# Patient Record
Sex: FEMALE | Race: WHITE | NOT HISPANIC OR LATINO | Employment: OTHER | URBAN - METROPOLITAN AREA
[De-identification: names, ages, dates, MRNs, and addresses within clinical notes are randomized per-mention and may not be internally consistent; named-entity substitution may affect disease eponyms.]

---

## 2017-11-16 ENCOUNTER — TRANSCRIBE ORDERS (OUTPATIENT)
Dept: ADMINISTRATIVE | Facility: HOSPITAL | Age: 77
End: 2017-11-16

## 2017-11-16 DIAGNOSIS — Z12.31 VISIT FOR SCREENING MAMMOGRAM: Primary | ICD-10-CM

## 2018-01-03 ENCOUNTER — HOSPITAL ENCOUNTER (OUTPATIENT)
Dept: MAMMOGRAPHY | Facility: HOSPITAL | Age: 78
Discharge: HOME | End: 2018-01-03
Attending: INTERNAL MEDICINE | Admitting: INTERNAL MEDICINE

## 2018-01-03 DIAGNOSIS — Z12.31 VISIT FOR SCREENING MAMMOGRAM: ICD-10-CM

## 2018-01-03 PROCEDURE — 77067 SCR MAMMO BI INCL CAD: CPT | Performed by: RADIOLOGY

## 2018-01-03 PROCEDURE — 77063 BREAST TOMOSYNTHESIS BI: CPT | Performed by: RADIOLOGY

## 2018-01-03 PROCEDURE — 77067 SCR MAMMO BI INCL CAD: CPT

## 2018-01-03 PROCEDURE — 77063 BREAST TOMOSYNTHESIS BI: CPT

## 2018-12-05 ENCOUNTER — TRANSCRIBE ORDERS (OUTPATIENT)
Dept: ADMINISTRATIVE | Facility: HOSPITAL | Age: 78
End: 2018-12-05

## 2018-12-05 DIAGNOSIS — Z12.31 VISIT FOR SCREENING MAMMOGRAM: Primary | ICD-10-CM

## 2019-01-24 PROBLEM — H40.9 GLAUCOMA: Status: ACTIVE | Noted: 2019-01-24

## 2019-01-24 PROBLEM — J30.9 ALLERGIC RHINITIS: Status: ACTIVE | Noted: 2019-01-24

## 2019-01-24 PROBLEM — I10 BENIGN ESSENTIAL HYPERTENSION: Status: ACTIVE | Noted: 2019-01-24

## 2019-01-24 PROBLEM — E55.9 VITAMIN D DEFICIENCY: Status: ACTIVE | Noted: 2019-01-24

## 2019-01-24 PROBLEM — E66.3 OVERWEIGHT (BMI 25.0-29.9): Status: ACTIVE | Noted: 2019-01-24

## 2019-01-24 PROBLEM — E78.2 MIXED HYPERLIPIDEMIA: Status: ACTIVE | Noted: 2019-01-24

## 2019-01-24 PROBLEM — M21.619 BUNION: Status: ACTIVE | Noted: 2019-01-24

## 2019-01-24 PROBLEM — M85.89 OSTEOPENIA OF MULTIPLE SITES: Status: ACTIVE | Noted: 2019-01-24

## 2019-01-24 PROBLEM — Z85.51 PERSONAL HISTORY OF BLADDER CANCER: Status: ACTIVE | Noted: 2019-01-24

## 2019-01-24 PROBLEM — K14.6 SORENESS OF TONGUE: Status: ACTIVE | Noted: 2019-01-24

## 2019-01-24 PROBLEM — K21.9 GERD WITHOUT ESOPHAGITIS: Status: ACTIVE | Noted: 2019-01-24

## 2019-01-24 PROBLEM — I70.0 ATHEROSCLEROSIS OF ABDOMINAL AORTA: Status: ACTIVE | Noted: 2019-01-24

## 2019-01-24 PROBLEM — M25.551 PAIN OF RIGHT HIP JOINT: Status: ACTIVE | Noted: 2019-01-24

## 2019-01-24 PROBLEM — Z91.81 RISK FOR FALLS: Status: ACTIVE | Noted: 2019-01-24

## 2019-01-24 RX ORDER — RANITIDINE 150 MG/1
TABLET ORAL
Qty: 90 TABLET | Refills: 1 | Status: SHIPPED | OUTPATIENT
Start: 2019-01-24 | End: 2019-04-19 | Stop reason: SDUPTHER

## 2019-01-25 ENCOUNTER — HOSPITAL ENCOUNTER (OUTPATIENT)
Dept: MAMMOGRAPHY | Facility: HOSPITAL | Age: 79
Discharge: HOME | End: 2019-01-25
Attending: INTERNAL MEDICINE | Admitting: INTERNAL MEDICINE

## 2019-01-25 DIAGNOSIS — Z12.31 VISIT FOR SCREENING MAMMOGRAM: ICD-10-CM

## 2019-01-25 PROCEDURE — 77063 BREAST TOMOSYNTHESIS BI: CPT | Performed by: RADIOLOGY

## 2019-01-25 PROCEDURE — 77067 SCR MAMMO BI INCL CAD: CPT | Performed by: RADIOLOGY

## 2019-01-25 PROCEDURE — 77067 SCR MAMMO BI INCL CAD: CPT

## 2019-01-25 PROCEDURE — 77063 BREAST TOMOSYNTHESIS BI: CPT

## 2019-02-01 ENCOUNTER — TELEPHONE (OUTPATIENT)
Dept: INTERNAL MEDICINE | Facility: CLINIC | Age: 79
End: 2019-02-01

## 2019-02-03 DIAGNOSIS — E78.2 MIXED HYPERLIPIDEMIA: Primary | ICD-10-CM

## 2019-02-04 ENCOUNTER — CLINICAL SUPPORT (OUTPATIENT)
Dept: INTERNAL MEDICINE | Facility: CLINIC | Age: 79
End: 2019-02-04

## 2019-02-04 DIAGNOSIS — E78.2 MIXED HYPERLIPIDEMIA: ICD-10-CM

## 2019-02-04 LAB
ALBUMIN SERPL-MCNC: 4.61 G/DL (ref 3.2–4.8)
ALBUMIN/GLOB SERPL: 2.6 G/DL (ref 1.5–2.5)
ALP SERPL-CCNC: 81 U/L (ref 25–100)
ALT SERPL W P-5'-P-CCNC: 29 U/L (ref 7–40)
ANION GAP SERPL CALCULATED.3IONS-SCNC: 8 MMOL/L (ref 3–11)
ARTICHOKE IGE QN: 94 MG/DL (ref 0–130)
AST SERPL-CCNC: 28 U/L (ref 0–33)
BILIRUB SERPL-MCNC: 0.4 MG/DL (ref 0.3–1.2)
BUN BLD-MCNC: 21 MG/DL (ref 9–23)
BUN/CREAT SERPL: 25.3 (ref 7–25)
CALCIUM SPEC-SCNC: 9.8 MG/DL (ref 8.7–10.4)
CHLORIDE SERPL-SCNC: 100 MMOL/L (ref 99–109)
CHOLEST SERPL-MCNC: 148 MG/DL (ref 0–200)
CO2 SERPL-SCNC: 31 MMOL/L (ref 20–31)
CREAT BLD-MCNC: 0.83 MG/DL (ref 0.6–1.3)
GFR SERPL CREATININE-BSD FRML MDRD: 67 ML/MIN/1.73
GLOBULIN UR ELPH-MCNC: 1.8 GM/DL
GLUCOSE BLD-MCNC: 97 MG/DL (ref 70–100)
HDLC SERPL-MCNC: 41 MG/DL (ref 40–60)
POTASSIUM BLD-SCNC: 4.2 MMOL/L (ref 3.5–5.5)
PROT SERPL-MCNC: 6.4 G/DL (ref 5.7–8.2)
SODIUM BLD-SCNC: 139 MMOL/L (ref 132–146)
TRIGL SERPL-MCNC: 120 MG/DL (ref 0–150)

## 2019-02-04 PROCEDURE — 80061 LIPID PANEL: CPT | Performed by: INTERNAL MEDICINE

## 2019-02-04 PROCEDURE — 36415 COLL VENOUS BLD VENIPUNCTURE: CPT

## 2019-02-04 PROCEDURE — 80053 COMPREHEN METABOLIC PANEL: CPT | Performed by: INTERNAL MEDICINE

## 2019-04-17 RX ORDER — SIMVASTATIN 80 MG
40 TABLET ORAL EVERY EVENING
Qty: 45 TABLET | Refills: 2 | Status: SHIPPED | OUTPATIENT
Start: 2019-04-17 | End: 2019-04-19 | Stop reason: SDUPTHER

## 2019-04-17 RX ORDER — METOPROLOL SUCCINATE 50 MG/1
50 TABLET, EXTENDED RELEASE ORAL 2 TIMES DAILY
Qty: 180 TABLET | Refills: 2 | Status: SHIPPED | OUTPATIENT
Start: 2019-04-17 | End: 2019-04-19 | Stop reason: SDUPTHER

## 2019-04-17 RX ORDER — HYDROCHLOROTHIAZIDE 12.5 MG/1
12.5 TABLET ORAL DAILY
Qty: 90 TABLET | Refills: 2 | Status: SHIPPED | OUTPATIENT
Start: 2019-04-17 | End: 2019-04-19 | Stop reason: SDUPTHER

## 2019-04-19 ENCOUNTER — OFFICE VISIT (OUTPATIENT)
Dept: INTERNAL MEDICINE | Facility: CLINIC | Age: 79
End: 2019-04-19

## 2019-04-19 VITALS
SYSTOLIC BLOOD PRESSURE: 126 MMHG | HEIGHT: 62 IN | WEIGHT: 143 LBS | BODY MASS INDEX: 26.31 KG/M2 | HEART RATE: 68 BPM | TEMPERATURE: 97.5 F | DIASTOLIC BLOOD PRESSURE: 76 MMHG

## 2019-04-19 DIAGNOSIS — Z00.00 MEDICARE ANNUAL WELLNESS VISIT, SUBSEQUENT: Primary | ICD-10-CM

## 2019-04-19 DIAGNOSIS — M85.89 OSTEOPENIA OF MULTIPLE SITES: ICD-10-CM

## 2019-04-19 PROCEDURE — G0439 PPPS, SUBSEQ VISIT: HCPCS | Performed by: NURSE PRACTITIONER

## 2019-04-19 PROCEDURE — 96160 PT-FOCUSED HLTH RISK ASSMT: CPT | Performed by: NURSE PRACTITIONER

## 2019-04-19 RX ORDER — METOPROLOL SUCCINATE 50 MG/1
50 TABLET, EXTENDED RELEASE ORAL 2 TIMES DAILY
Qty: 180 TABLET | Refills: 1 | Status: SHIPPED | OUTPATIENT
Start: 2019-04-19 | End: 2020-04-27 | Stop reason: SDUPTHER

## 2019-04-19 RX ORDER — SIMVASTATIN 80 MG
40 TABLET ORAL EVERY EVENING
Qty: 45 TABLET | Refills: 1 | Status: SHIPPED | OUTPATIENT
Start: 2019-04-19 | End: 2019-04-26 | Stop reason: DRUGHIGH

## 2019-04-19 RX ORDER — TRAVOPROST 0.004 %
1 DROPS OPHTHALMIC (EYE) NIGHTLY
COMMUNITY
Start: 2019-02-05 | End: 2021-07-28

## 2019-04-19 RX ORDER — OMEGA-3-ACID ETHYL ESTERS 1 G/1
4 CAPSULE, LIQUID FILLED ORAL NIGHTLY
COMMUNITY
Start: 2019-03-25 | End: 2019-04-26 | Stop reason: SDUPTHER

## 2019-04-19 RX ORDER — RANITIDINE 150 MG/1
150 TABLET ORAL DAILY
Qty: 90 TABLET | Refills: 1 | Status: SHIPPED | OUTPATIENT
Start: 2019-04-19 | End: 2019-09-06 | Stop reason: SDUPTHER

## 2019-04-19 RX ORDER — SILICONE ADHESIVE 1.5" X 3"
1 SHEET (EA) TOPICAL 2 TIMES DAILY
COMMUNITY
Start: 2018-04-11

## 2019-04-19 RX ORDER — ASPIRIN 325 MG
1 TABLET ORAL DAILY
COMMUNITY
Start: 2016-11-09 | End: 2019-04-26 | Stop reason: DRUGHIGH

## 2019-04-19 RX ORDER — HYDROCHLOROTHIAZIDE 12.5 MG/1
12.5 TABLET ORAL DAILY
Qty: 90 TABLET | Refills: 1 | Status: SHIPPED | OUTPATIENT
Start: 2019-04-19 | End: 2020-04-27 | Stop reason: SDUPTHER

## 2019-04-19 NOTE — PROGRESS NOTES
QUICK REFERENCE INFORMATION:  The ABCs of the Annual Wellness Visit    Subsequent Medicare Wellness Visit     HEALTH RISK ASSESSMENT    : 1940    Recent Hospitalizations:  No hospitalization(s) within the last year..  ccc      Current Medical Providers:  Patient Care Team:  Marie Soto MD as PCP - General  Marie Soto MD as PCP - Family Medicine        Smoking Status:  Social History     Tobacco Use   Smoking Status Never Smoker   Smokeless Tobacco Never Used       Alcohol Consumption:  Social History     Substance and Sexual Activity   Alcohol Use Yes   • Alcohol/week: 0.6 oz   • Types: 1 Shots of liquor per week       Depression Screen:   PHQ-2/PHQ-9 Depression Screening 2019   Little interest or pleasure in doing things 0   Feeling down, depressed, or hopeless 0   Total Score 0       Health Habits and Functional and Cognitive Screening:  Functional & Cognitive Status 2019   Do you have difficulty preparing food and eating? No   Do you have difficulty bathing yourself, getting dressed or grooming yourself? No   Do you have difficulty using the toilet? No   Do you have difficulty moving around from place to place? No   Do you have trouble with steps or getting out of a bed or a chair? No   In the past year have you fallen or experienced a near fall? No   Current Diet Well Balanced Diet   Dental Exam Up to date   Eye Exam Up to date   Exercise (times per week) 2 times per week   Current Exercise Activities Include Aerobics   Do you need help using the phone?  Yes   Are you deaf or do you have serious difficulty hearing?  Yes   Do you need help with transportation? No   Do you need help shopping? No   Do you need help preparing meals?  No   Do you need help with housework?  No   Do you need help with laundry? No   Do you need help taking your medications? No   Do you need help managing money? No   Do you ever drive or ride in a car without wearing a seat belt? No   Have you felt  unusual stress, anger or loneliness in the last month? No   Who do you live with? Alone   If you need help, do you have trouble finding someone available to you? No   Have you been bothered in the last four weeks by sexual problems? No   Do you have difficulty concentrating, remembering or making decisions? No           Does the patient have evidence of cognitive impairment? No    Asiprin use counseling: Taking ASA appropriately as indicated      Recent Lab Results:          Lab Results   Component Value Date    CHOL 148 02/04/2019    TRIG 120 02/04/2019    HDL 41 02/04/2019           Age-appropriate Screening Schedule:  Refer to the list below for future screening recommendations based on patient's age, sex and/or medical conditions. Orders for these recommended tests are listed in the plan section. The patient has been provided with a written plan.    Health Maintenance   Topic Date Due   • ZOSTER VACCINE (2 of 3) 11/26/2010   • DXA SCAN  05/26/2019   • INFLUENZA VACCINE  08/01/2019   • LIPID PANEL  02/04/2020   • TDAP/TD VACCINES (2 - Td) 04/19/2021   • PNEUMOCOCCAL VACCINES (65+ LOW/MEDIUM RISK)  Completed        Subjective   History of Present Illness    Amanda Lawson is a 78 y.o. female who presents for an Annual Wellness Visit.    The following portions of the patient's history were reviewed and updated as appropriate: allergies, current medications, past family history, past medical history, past social history, past surgical history and problem list.    Outpatient Medications Prior to Visit   Medication Sig Dispense Refill   • aspirin 325 MG tablet Take 1 tablet by mouth Daily.     • CALCIUM-VITAMIN D PO Take 1 tablet by mouth Daily.     • Cholecalciferol (VITAMIN D PO) Take 1 tablet by mouth Daily.     • Coenzyme Q10 (CO Q 10) 100 MG capsule Take 1 capsule by mouth Daily.     • Multiple Vitamins-Minerals (SENIOR MULTIVITAMIN PLUS PO) Take 1 tablet by mouth Daily.     • omega-3 acid ethyl esters (LOVAZA)  1 g capsule Take 4 capsules by mouth Every Night.     • sodium chloride (KARRIE 128) 5 % ophthalmic solution Apply 1 drop to eye(s) as directed by provider Every Night.     • TRAVATAN Z 0.004 % solution ophthalmic solution Apply 1 drop to eye(s) as directed by provider Every Night.     • vitamin E 200 UNIT capsule Take 1 capsule by mouth Daily.     • hydrochlorothiazide (HYDRODIURIL) 12.5 MG tablet Take 1 tablet by mouth Daily. 90 tablet 2   • metoprolol succinate XL (TOPROL-XL) 50 MG 24 hr tablet Take 1 tablet by mouth 2 (Two) Times a Day. 180 tablet 2   • raNITIdine (ZANTAC) 150 MG tablet TAKE ONE TABLET BY MOUTH DAILY 90 tablet 1   • simvastatin (ZOCOR) 80 MG tablet Take 0.5 tablets by mouth Every Evening. 45 tablet 2     No facility-administered medications prior to visit.        Patient Active Problem List   Diagnosis   • Vitamin D deficiency   • Personal history of bladder cancer   • Benign essential hypertension   • Allergic rhinitis   • Glaucoma   • Osteopenia of multiple sites   • Pain of right hip joint   • Risk for falls   • Mixed hyperlipidemia   • Bunion   • Atherosclerosis of abdominal aorta (CMS/HCC)   • Overweight (BMI 25.0-29.9)   • Soreness of tongue   • GERD without esophagitis       Advance Care Planning:  Patient has an advance directive - a copy has not been provided. Have asked the patient to send this to us to add to record    Identification of Risk Factors:  Risk factors include: none.    Review of Systems   Constitutional: Negative for chills, fatigue and fever.   HENT: Negative for congestion, ear pain and sinus pressure.    Respiratory: Negative for cough, chest tightness, shortness of breath and wheezing.    Cardiovascular: Negative for chest pain and palpitations.   Gastrointestinal: Negative for abdominal pain, blood in stool and constipation.   Skin: Negative for color change.   Allergic/Immunologic: Negative for environmental allergies.   Neurological: Negative for dizziness, speech  "difficulty and headaches.   Psychiatric/Behavioral: Negative for confusion. The patient is not nervous/anxious.        Compared to one year ago, the patient feels her physical health is the same.  Compared to one year ago, the patient feels her mental health is the same.    Objective     Physical Exam    Vitals:    04/19/19 0922   BP: 126/76   BP Location: Left arm   Patient Position: Sitting   Cuff Size: Adult   Pulse: 68   Temp: 97.5 °F (36.4 °C)   TempSrc: Temporal   Weight: 64.9 kg (143 lb)   Height: 157 cm (61.81\")   PainSc: 0-No pain       Patient's Body mass index is 26.32 kg/m². BMI is within normal parameters. No follow-up required..      Assessment/Plan   Patient Self-Management and Personalized Health Advice  The patient has been provided with information about: none and preventive services including:   · Bone densitometry screening, Fall Risk assessment done.    Visit Diagnoses:    ICD-10-CM ICD-9-CM   1. Medicare annual wellness visit, subsequent Z00.00 V70.0   2. Osteopenia of multiple sites M85.89 733.90       Orders Placed This Encounter   Procedures   • DEXA Bone Density Axial     Standing Status:   Future     Standing Expiration Date:   4/19/2020     Order Specific Question:   Reason for Exam:     Answer:   osteopenia       Outpatient Encounter Medications as of 4/19/2019   Medication Sig Dispense Refill   • aspirin 325 MG tablet Take 1 tablet by mouth Daily.     • CALCIUM-VITAMIN D PO Take 1 tablet by mouth Daily.     • Cholecalciferol (VITAMIN D PO) Take 1 tablet by mouth Daily.     • Coenzyme Q10 (CO Q 10) 100 MG capsule Take 1 capsule by mouth Daily.     • hydrochlorothiazide (HYDRODIURIL) 12.5 MG tablet Take 1 tablet by mouth Daily. 90 tablet 1   • metoprolol succinate XL (TOPROL-XL) 50 MG 24 hr tablet Take 1 tablet by mouth 2 (Two) Times a Day. 180 tablet 1   • Multiple Vitamins-Minerals (SENIOR MULTIVITAMIN PLUS PO) Take 1 tablet by mouth Daily.     • omega-3 acid ethyl esters (LOVAZA) 1 g " capsule Take 4 capsules by mouth Every Night.     • raNITIdine (ZANTAC) 150 MG tablet Take 1 tablet by mouth Daily. 90 tablet 1   • simvastatin (ZOCOR) 80 MG tablet Take 0.5 tablets by mouth Every Evening. 45 tablet 1   • sodium chloride (KARRIE 128) 5 % ophthalmic solution Apply 1 drop to eye(s) as directed by provider Every Night.     • TRAVATAN Z 0.004 % solution ophthalmic solution Apply 1 drop to eye(s) as directed by provider Every Night.     • vitamin E 200 UNIT capsule Take 1 capsule by mouth Daily.     • [DISCONTINUED] hydrochlorothiazide (HYDRODIURIL) 12.5 MG tablet Take 1 tablet by mouth Daily. 90 tablet 2   • [DISCONTINUED] metoprolol succinate XL (TOPROL-XL) 50 MG 24 hr tablet Take 1 tablet by mouth 2 (Two) Times a Day. 180 tablet 2   • [DISCONTINUED] raNITIdine (ZANTAC) 150 MG tablet TAKE ONE TABLET BY MOUTH DAILY 90 tablet 1   • [DISCONTINUED] simvastatin (ZOCOR) 80 MG tablet Take 0.5 tablets by mouth Every Evening. 45 tablet 2     No facility-administered encounter medications on file as of 4/19/2019.        Reviewed use of high risk medication in the elderly: no  Reviewed for potential of harmful drug interactions in the elderly: no    Follow Up:  Return for Next scheduled follow up, Annual physical.     An After Visit Summary and PPPS with all of these plans were given to the patient.

## 2019-04-19 NOTE — PATIENT INSTRUCTIONS
Medicare Wellness  Personal Prevention Plan of Service     Date of Office Visit:  2019  Encounter Provider:  RUY Spencer  Place of Service:  [facility]  Patient Name: Amanda Lawson  :  1940    As part of the Medicare Wellness portion of your visit today, we are providing you with this personalized preventive plan of services (PPPS). This plan is based upon recommendations of the [place] Preventive Services Task Force (USPSTF) and the Advisory Committee on Immunization Practices (ACIP).    This lists the preventive care services that should be considered, and provides dates of when you are due. Items listed as completed are up-to-date and do not require any further intervention.    Health Maintenance   Topic Date Due   • ZOSTER VACCINE (2 of 3) 2010   • MEDICARE ANNUAL WELLNESS  2019   • DXA SCAN  2019   • INFLUENZA VACCINE  2019   • LIPID PANEL  2020   • TDAP/TD VACCINES (2 - Td) 2021   • PNEUMOCOCCAL VACCINES (65+ LOW/MEDIUM RISK)  Completed       Orders Placed This Encounter   Procedures   • DEXA Bone Density Axial     Standing Status:   Future     Standing Expiration Date:   2020     Order Specific Question:   Reason for Exam:     Answer:   osteopenia       Return for Next scheduled follow up, Annual physical.

## 2019-04-26 ENCOUNTER — OFFICE VISIT (OUTPATIENT)
Dept: INTERNAL MEDICINE | Facility: CLINIC | Age: 79
End: 2019-04-26

## 2019-04-26 VITALS
SYSTOLIC BLOOD PRESSURE: 154 MMHG | WEIGHT: 144 LBS | DIASTOLIC BLOOD PRESSURE: 83 MMHG | TEMPERATURE: 97.9 F | BODY MASS INDEX: 26.5 KG/M2 | HEIGHT: 62 IN | HEART RATE: 72 BPM

## 2019-04-26 DIAGNOSIS — N39.3 STRESS INCONTINENCE OF URINE: ICD-10-CM

## 2019-04-26 DIAGNOSIS — I70.0 ATHEROSCLEROSIS OF ABDOMINAL AORTA (HCC): ICD-10-CM

## 2019-04-26 DIAGNOSIS — K21.9 GERD WITHOUT ESOPHAGITIS: ICD-10-CM

## 2019-04-26 DIAGNOSIS — E55.9 VITAMIN D DEFICIENCY: ICD-10-CM

## 2019-04-26 DIAGNOSIS — Z91.81 RISK FOR FALLS: ICD-10-CM

## 2019-04-26 DIAGNOSIS — E78.2 MIXED HYPERLIPIDEMIA: ICD-10-CM

## 2019-04-26 DIAGNOSIS — N81.4 PROLAPSED UTERUS: Chronic | ICD-10-CM

## 2019-04-26 DIAGNOSIS — Z85.51 PERSONAL HISTORY OF BLADDER CANCER: ICD-10-CM

## 2019-04-26 DIAGNOSIS — M85.89 OSTEOPENIA OF MULTIPLE SITES: ICD-10-CM

## 2019-04-26 DIAGNOSIS — I10 BENIGN ESSENTIAL HYPERTENSION: Primary | ICD-10-CM

## 2019-04-26 PROCEDURE — 99214 OFFICE O/P EST MOD 30 MIN: CPT | Performed by: INTERNAL MEDICINE

## 2019-04-26 RX ORDER — OMEGA-3-ACID ETHYL ESTERS 1 G/1
2 CAPSULE, LIQUID FILLED ORAL NIGHTLY
Qty: 180 CAPSULE | Refills: 1 | Status: SHIPPED | OUTPATIENT
Start: 2019-04-26 | End: 2019-11-06 | Stop reason: SDUPTHER

## 2019-04-26 RX ORDER — SIMVASTATIN 20 MG
20 TABLET ORAL NIGHTLY
Qty: 90 TABLET | Refills: 1 | Status: SHIPPED | OUTPATIENT
Start: 2019-04-26 | End: 2020-01-31

## 2019-04-26 NOTE — PROGRESS NOTES
[facility]     Amanda Lawson  1940   [id number]      Patient Care Team:  Marie Soto MD as PCP - General  Marie Soto MD as PCP - Family Medicine    Chief Complaint;:   Chief Complaint   Patient presents with   • Medicare Wellness-Initial Visit   Chief complaint: Patient presents with follow-up of chronic conditions including hypertension, hyperlipidemia, atherosclerosis, GERD.        HPI  Patient is a 78 y.o. female presents with follow-up of hypertension, hyperlipidemia, atherosclerosis, GERD.    CHRONIC CONDITIONS  Her hypertension has been well controlled.  This has been the only high readings she has seen.  It usually runs in the 120s over 70s as it did last week when she saw Jennifer.  She avoids salt in the diet.  She goes to aerobics class 3-4 days a week and walks.    For hyperlipidemia, she has been taking for the low lovaza tablets every evening and 80 mg of simvastatin every evening.  We checked her fasting lipids in February and her LDL was 94.  Her total cholesterol was 140.  Her triglycerides were well controlled as well.  She eats a low-fat diet and avoid sugars in the diet.    She does weightbearing exercises on the feet and with hand weights at her aerobics classes and some at home as well.  She takes vitamin D and calcium for osteopenia.    Atherosclerosis of the aorta is asymptomatic.  It was seen on imaging.  She does take an aspirin and beta-blocker and statin every day.    GERD symptoms are well controlled with ranitidine and practicing antireflux precautions.    She has not had any falls over the last year.  At her aerobics class they do particular exercises for balance and she feels that has helped her a lot.    She is taking vitamin D regularly.    She follows up with her neurologist regularly for the history of bladder cancer.            Past Medical History:   Diagnosis Date   • Bladder cancer (CMS/Formerly [facility]) 1990   • Plantar fasciitis 2010   • Rosacea    •  Vasovagal syncope     since childhood       Past Surgical History:   Procedure Laterality Date   • ENDOMETRIAL BIOPSY  1983       Family History   Problem Relation Age of Onset   • Coronary artery disease Mother    • Hypertension Father    • Coronary artery disease Brother    • Hyperlipidemia Daughter    • Obesity Maternal Grandmother    • Mitral valve prolapse Daughter    • Breast cancer Neg Hx    • Ovarian cancer Neg Hx        Social History     Socioeconomic History   • Marital status:      Spouse name: Not on file   • Number of children: Not on file   • Years of education: Not on file   • Highest education level: Not on file   Tobacco Use   • Smoking status: Never Smoker   • Smokeless tobacco: Never Used   Substance and Sexual Activity   • Alcohol use: Yes     Alcohol/week: 0.6 oz     Types: 1 Shots of liquor per week   • Drug use: No   • Sexual activity: Defer       No Known Allergies    Review of Systems:     Review of Systems   Constitutional: Negative for chills, fatigue, fever, unexpected weight gain and unexpected weight loss.   HENT: Negative for congestion, ear pain, sinus pressure, sore throat and trouble swallowing.    Eyes: Negative for visual disturbance.   Respiratory: Negative for cough, chest tightness, shortness of breath and wheezing.    Cardiovascular: Negative for chest pain, palpitations and leg swelling.   Gastrointestinal: Negative for abdominal pain, blood in stool, constipation, diarrhea and GERD.   Endocrine: Negative for cold intolerance and heat intolerance.   Genitourinary: Positive for urinary incontinence. Negative for dysuria, frequency, hematuria and urgency.        Occasional urine leakage with sneeze or hard cough.   Musculoskeletal: Negative for arthralgias, back pain, gait problem and joint swelling.   Skin: Negative for color change and rash.   Allergic/Immunologic: Negative for environmental allergies, food allergies and immunocompromised state.   Neurological:  "Negative for dizziness, speech difficulty, weakness, numbness, headache and memory problem.   Hematological: Negative for adenopathy. Does not bruise/bleed easily.   Psychiatric/Behavioral: Negative for decreased concentration, sleep disturbance, suicidal ideas and depressed mood. The patient is not nervous/anxious.        Vital Signs  Vitals:    04/26/19 0816   BP: 154/83   BP Location: Left arm   Patient Position: Sitting   Cuff Size: Adult   Pulse: 72   Temp: 97.9 °F (36.6 °C)   TempSrc: Temporal   Weight: 65.3 kg (144 lb)   Height: 157 cm (61.81\")   PainSc: 0-No pain     Body mass index is 26.5 kg/m².    Current Outpatient Medications:   •  CALCIUM-VITAMIN D PO, Take 1 tablet by mouth Daily., Disp: , Rfl:   •  Cholecalciferol (VITAMIN D PO), Take 1 tablet by mouth Daily., Disp: , Rfl:   •  Coenzyme Q10 (CO Q 10) 100 MG capsule, Take 1 capsule by mouth Daily., Disp: , Rfl:   •  hydrochlorothiazide (HYDRODIURIL) 12.5 MG tablet, Take 1 tablet by mouth Daily., Disp: 90 tablet, Rfl: 1  •  metoprolol succinate XL (TOPROL-XL) 50 MG 24 hr tablet, Take 1 tablet by mouth 2 (Two) Times a Day., Disp: 180 tablet, Rfl: 1  •  Multiple Vitamins-Minerals (SENIOR MULTIVITAMIN PLUS PO), Take 1 tablet by mouth Daily., Disp: , Rfl:   •  omega-3 acid ethyl esters (LOVAZA) 1 g capsule, Take 2 capsules by mouth Every Night., Disp: 180 capsule, Rfl: 1  •  raNITIdine (ZANTAC) 150 MG tablet, Take 1 tablet by mouth Daily., Disp: 90 tablet, Rfl: 1  •  sodium chloride (KARRIE 128) 5 % ophthalmic solution, Apply 1 drop to eye(s) as directed by provider Every Night., Disp: , Rfl:   •  TRAVATAN Z 0.004 % solution ophthalmic solution, Apply 1 drop to eye(s) as directed by provider Every Night., Disp: , Rfl:   •  aspirin 81 MG tablet, Take 1 tablet by mouth Daily., Disp: 90 tablet, Rfl: 1  •  simvastatin (ZOCOR) 20 MG tablet, Take 1 tablet by mouth Every Night., Disp: 90 tablet, Rfl: 1    Physical Exam:    Physical Exam   Constitutional: She is " oriented to person, place, and time. She appears well-developed and well-nourished.   Eyes: Conjunctivae and EOM are normal. Pupils are equal, round, and reactive to light.   Neck: Normal range of motion. Neck supple. No thyromegaly present.   Cardiovascular: Normal rate, regular rhythm, normal heart sounds and intact distal pulses.   No murmur heard.  Pulmonary/Chest: Effort normal and breath sounds normal. She has no wheezes. Right breast exhibits inverted nipple. Right breast exhibits no mass, no nipple discharge, no skin change and no tenderness. Left breast exhibits inverted nipple. Left breast exhibits no mass, no nipple discharge, no skin change and no tenderness.   Inverted nipples are chronic and unchanged.   Abdominal: Soft. Bowel sounds are normal. She exhibits no distension and no mass. There is no tenderness.   Musculoskeletal: Normal range of motion. She exhibits no edema or tenderness.       Neurological Sensory Findings -  No right ankle/foot altered proprioception and no left ankle/foot altered proprioception  Vascular Status -  Her right foot exhibits normal foot vasculature  and no edema. Her left foot exhibits normal foot vasculature  and no edema.  Skin Integrity  -  Her right foot skin is intact.Her left foot skin is intact..     Lymphadenopathy:        Head (right side): No submental, no submandibular, no preauricular and no posterior auricular adenopathy present.        Head (left side): No submental, no submandibular, no preauricular and no posterior auricular adenopathy present.     She has no cervical adenopathy.     She has no axillary adenopathy.   Neurological: She is alert and oriented to person, place, and time. She has normal strength. No cranial nerve deficit or sensory deficit. Coordination and gait normal.   Skin: Skin is warm and dry. No rash noted.   Psychiatric: She has a normal mood and affect. Her speech is normal and behavior is normal. Judgment and thought content normal.  Cognition and memory are normal.   Nursing note and vitals reviewed.       ACE III MINI        Results Review:    None we did review her fasting labs from February.    CMP:  Lab Results   Component Value Date    BUN 21 02/04/2019    CREATININE 0.83 02/04/2019    EGFRIFNONA 67 02/04/2019    BCR 25.3 (H) 02/04/2019     02/04/2019    K 4.2 02/04/2019    CO2 31.0 02/04/2019    CALCIUM 9.8 02/04/2019    ALBUMIN 4.61 02/04/2019    BILITOT 0.4 02/04/2019    ALKPHOS 81 02/04/2019    AST 28 02/04/2019    ALT 29 02/04/2019     HbA1c:  No results found for: HGBA1C  Microalbumin:  No results found for: MICROALBUR, POCMALB, POCCREAT  Lipid Panel  Lab Results   Component Value Date    CHOL 148 02/04/2019    TRIG 120 02/04/2019    HDL 41 02/04/2019    LDL 94 02/04/2019    AST 28 02/04/2019    ALT 29 02/04/2019       Medication Review: Medications reviewed and noted    Assessment/Plan:    Amanda was seen today for medicare wellness-initial visit.    Diagnoses and all orders for this visit:    Benign essential hypertension    Mixed hyperlipidemia  -     simvastatin (ZOCOR) 20 MG tablet; Take 1 tablet by mouth Every Night.    Osteopenia of multiple sites    Prolapsed uterus    Atherosclerosis of abdominal aorta (CMS/HCC)  -     aspirin 81 MG tablet; Take 1 tablet by mouth Daily.    GERD without esophagitis    Risk for falls    Vitamin D deficiency    Personal history of bladder cancer    Stress incontinence of urine    Other orders  -     omega-3 acid ethyl esters (LOVAZA) 1 g capsule; Take 2 capsules by mouth Every Night.        Patient Instructions   For hypertension, continue taking hydrochlorothiazide daily and metoprolol twice a day.  Continue to avoid salt in the diet.    For hyperlipidemia, we will decrease the simvastatin to 20 mg every evening instead of 40 mg since her numbers are very good.  We will decrease the lovaza (omega-3 acid ethyl esters) 2 to capsules every evening instead of 4.  Continue to exercise regularly  and eat a low-fat and low sugar diet.    For osteopenia, continue taking calcium and vitamin D.  Continue doing weightbearing exercises including walking and with hand weights.  Use small hand weights for 5 to 10 minutes at home on the days you do not go to exercise class.    For GERD symptoms, continue taking ranitidine.  Continue to avoid eating close to bedtime and avoid large meals.    For atherosclerosis of the aorta which is asymptomatic, taking your metoprolol and aspirin and simvastatin every day helps to prevent any problems with that.    For prolapsed uterus, she will continue doing the pelvic strengthening exercises that the gynecologist showed her.    Stress incontinence of urine with sneeze or cough is mild and intermittent.  Doing the pelvic strengthening exercises well help that too.    For history of bladder cancer, she will continue regular follow-up with her urologist.      Fall Prevention in the Home, Adult  Falls can cause injuries and can affect people from all age groups. There are many simple things that you can do to make your home safe and to help prevent falls. Ask for help when making these changes, if needed.  What actions can I take to prevent falls?  General instructions  · Use good lighting in all rooms. Replace any light bulbs that burn out.  · Turn on lights if it is dark. Use night-lights.  · Place frequently used items in easy-to-reach places. Lower the shelves around your home if necessary.  · Set up furniture so that there are clear paths around it. Avoid moving your furniture around.  · Remove throw rugs and other tripping hazards from the floor.  · Avoid walking on wet floors.  · Fix any uneven floor surfaces.  · Add color or contrast paint or tape to grab bars and handrails in your home. Place contrasting color strips on the first and last steps of stairways.  · When you use a stepladder, make sure that it is completely opened and that the sides are firmly locked. Have someone  hold the ladder while you are using it. Do not climb a closed stepladder.  · Be aware of any and all pets.  What can I do in the bathroom?  · Keep the floor dry. Immediately clean up any water that spills onto the floor.  · Remove soap buildup in the tub or shower on a regular basis.  · Use non-skid mats or decals on the floor of the tub or shower.  · Attach bath mats securely with double-sided, non-slip rug tape.  · If you need to sit down while you are in the shower, use a plastic, non-slip stool.  · Install grab bars by the toilet and in the tub and shower. Do not use towel bars as grab bars.  What can I do in the bedroom?  · Make sure that a bedside light is easy to reach.  · Do not use oversized bedding that drapes onto the floor.  · Have a firm chair that has side arms to use for getting dressed.  What can I do in the kitchen?  · Clean up any spills right away.  · If you need to reach for something above you, use a sturdy step stool that has a grab bar.  · Keep electrical cables out of the way.  · Do not use floor polish or wax that makes floors slippery. If you must use wax, make sure that it is non-skid floor wax.  What can I do in the stairways?  · Do not leave any items on the stairs.  · Make sure that you have a light switch at the top of the stairs and the bottom of the stairs. Have them installed if you do not have them.  · Make sure that there are handrails on both sides of the stairs. Fix handrails that are broken or loose. Make sure that handrails are as long as the stairways.  · Install non-slip stair treads on all stairs in your home.  · Avoid having throw rugs at the top or bottom of stairways, or secure the rugs with carpet tape to prevent them from moving.  · Choose a carpet design that does not hide the edge of steps on the stairway.  · Check any carpeting to make sure that it is firmly attached to the stairs. Fix any carpet that is loose or worn.  What can I do on the outside of my  home?  · Use bright outdoor lighting.  · Regularly repair the edges of walkways and driveways and fix any cracks.  · Remove high doorway thresholds.  · Trim any shrubbery on the main path into your home.  · Regularly check that handrails are securely fastened and in good repair. Both sides of any steps should have handrails.  · Install guardrails along the edges of any raised decks or porches.  · Clear walkways of debris and clutter, including tools and rocks.  · Have leaves, snow, and ice cleared regularly.  · Use sand or salt on walkways during winter months.  · In the garage, clean up any spills right away, including grease or oil spills.  What other actions can I take?  · Wear closed-toe shoes that fit well and support your feet. Wear shoes that have rubber soles or low heels.  · Use mobility aids as needed, such as canes, walkers, scooters, and crutches.  · Review your medicines with your health care provider. Some medicines can cause dizziness or changes in blood pressure, which increase your risk of falling.  Talk with your health care provider about other ways that you can decrease your risk of falls. This may include working with a physical therapist or  to improve your strength, balance, and endurance.  Where to find more information  · Centers for Disease Control and Prevention, [place]: https://www.cdc.gov  · National [place] on Aging: https://xb8govn.rajeev.nih.gov  Contact a health care provider if:  · You are afraid of falling at home.  · You feel weak, drowsy, or dizzy at home.  · You fall at home.  Summary  · There are many simple things that you can do to make your home safe and to help prevent falls.  · Ways to make your home safe include removing tripping hazards and installing grab bars in the bathroom.  · Ask for help when making these changes in your home.  This information is not intended to replace advice given to you by your health care provider. Make sure you discuss any questions you  have with your health care provider.  Document Released: 12/08/2003 Document Revised: 08/02/2018 Document Reviewed: 08/02/2018  [employer] Interactive Patient Education © 2019 [employer]        Plan of care reviewed with patient at the conclusion of today's visit. Education was provided regarding diagnosis, management, and any prescribed or recommended OTC medications.Patient verbalizes understanding of and agreement with management plan.         Marie Soto MD

## 2019-04-26 NOTE — PATIENT INSTRUCTIONS
For hypertension, continue taking hydrochlorothiazide daily and metoprolol twice a day.  Continue to avoid salt in the diet.    For hyperlipidemia, we will decrease the simvastatin to 20 mg every evening instead of 40 mg since her numbers are very good.  We will decrease the lovaza (omega-3 acid ethyl esters) 2 to capsules every evening instead of 4.  Continue to exercise regularly and eat a low-fat and low sugar diet.    For osteopenia, continue taking calcium and vitamin D.  Continue doing weightbearing exercises including walking and with hand weights.  Use small hand weights for 5 to 10 minutes at home on the days you do not go to exercise class.    For GERD symptoms, continue taking ranitidine.  Continue to avoid eating close to bedtime and avoid large meals.    For atherosclerosis of the aorta which is asymptomatic, taking your metoprolol and aspirin and simvastatin every day helps to prevent any problems with that.    For prolapsed uterus, she will continue doing the pelvic strengthening exercises that the gynecologist showed her.    Stress incontinence of urine with sneeze or cough is mild and intermittent.  Doing the pelvic strengthening exercises well help that too.    For history of bladder cancer, she will continue regular follow-up with her urologist.      Fall Prevention in the Home, Adult  Falls can cause injuries and can affect people from all age groups. There are many simple things that you can do to make your home safe and to help prevent falls. Ask for help when making these changes, if needed.  What actions can I take to prevent falls?  General instructions  · Use good lighting in all rooms. Replace any light bulbs that burn out.  · Turn on lights if it is dark. Use night-lights.  · Place frequently used items in easy-to-reach places. Lower the shelves around your home if necessary.  · Set up furniture so that there are clear paths around it. Avoid moving your furniture around.  · Remove throw  rugs and other tripping hazards from the floor.  · Avoid walking on wet floors.  · Fix any uneven floor surfaces.  · Add color or contrast paint or tape to grab bars and handrails in your home. Place contrasting color strips on the first and last steps of stairways.  · When you use a stepladder, make sure that it is completely opened and that the sides are firmly locked. Have someone hold the ladder while you are using it. Do not climb a closed stepladder.  · Be aware of any and all pets.  What can I do in the bathroom?  · Keep the floor dry. Immediately clean up any water that spills onto the floor.  · Remove soap buildup in the tub or shower on a regular basis.  · Use non-skid mats or decals on the floor of the tub or shower.  · Attach bath mats securely with double-sided, non-slip rug tape.  · If you need to sit down while you are in the shower, use a plastic, non-slip stool.  · Install grab bars by the toilet and in the tub and shower. Do not use towel bars as grab bars.  What can I do in the bedroom?  · Make sure that a bedside light is easy to reach.  · Do not use oversized bedding that drapes onto the floor.  · Have a firm chair that has side arms to use for getting dressed.  What can I do in the kitchen?  · Clean up any spills right away.  · If you need to reach for something above you, use a sturdy step stool that has a grab bar.  · Keep electrical cables out of the way.  · Do not use floor polish or wax that makes floors slippery. If you must use wax, make sure that it is non-skid floor wax.  What can I do in the stairways?  · Do not leave any items on the stairs.  · Make sure that you have a light switch at the top of the stairs and the bottom of the stairs. Have them installed if you do not have them.  · Make sure that there are handrails on both sides of the stairs. Fix handrails that are broken or loose. Make sure that handrails are as long as the stairways.  · Install non-slip stair treads on all  stairs in your home.  · Avoid having throw rugs at the top or bottom of stairways, or secure the rugs with carpet tape to prevent them from moving.  · Choose a carpet design that does not hide the edge of steps on the stairway.  · Check any carpeting to make sure that it is firmly attached to the stairs. Fix any carpet that is loose or worn.  What can I do on the outside of my home?  · Use bright outdoor lighting.  · Regularly repair the edges of walkways and driveways and fix any cracks.  · Remove high doorway thresholds.  · Trim any shrubbery on the main path into your home.  · Regularly check that handrails are securely fastened and in good repair. Both sides of any steps should have handrails.  · Install guardrails along the edges of any raised decks or porches.  · Clear walkways of debris and clutter, including tools and rocks.  · Have leaves, snow, and ice cleared regularly.  · Use sand or salt on walkways during winter months.  · In the garage, clean up any spills right away, including grease or oil spills.  What other actions can I take?  · Wear closed-toe shoes that fit well and support your feet. Wear shoes that have rubber soles or low heels.  · Use mobility aids as needed, such as canes, walkers, scooters, and crutches.  · Review your medicines with your health care provider. Some medicines can cause dizziness or changes in blood pressure, which increase your risk of falling.  Talk with your health care provider about other ways that you can decrease your risk of falls. This may include working with a physical therapist or  to improve your strength, balance, and endurance.  Where to find more information  · Centers for Disease Control and Prevention, [place]: https://www.cdc.gov  · National [place] on Aging: https://fi9yyva.rajeev.nih.gov  Contact a health care provider if:  · You are afraid of falling at home.  · You feel weak, drowsy, or dizzy at home.  · You fall at home.  Summary  · There are  many simple things that you can do to make your home safe and to help prevent falls.  · Ways to make your home safe include removing tripping hazards and installing grab bars in the bathroom.  · Ask for help when making these changes in your home.  This information is not intended to replace advice given to you by your health care provider. Make sure you discuss any questions you have with your health care provider.  Document Released: 12/08/2003 Document Revised: 08/02/2018 Document Reviewed: 08/02/2018  [employer] Interactive Patient Education © 2019 [employer]

## 2019-09-06 RX ORDER — RANITIDINE 150 MG/1
150 TABLET ORAL DAILY
Qty: 90 TABLET | Refills: 1 | Status: SHIPPED | OUTPATIENT
Start: 2019-09-06 | End: 2019-11-06

## 2019-10-30 ENCOUNTER — TELEPHONE (OUTPATIENT)
Dept: INTERNAL MEDICINE | Facility: CLINIC | Age: 79
End: 2019-10-30

## 2019-10-30 DIAGNOSIS — E78.2 MIXED HYPERLIPIDEMIA: ICD-10-CM

## 2019-10-30 DIAGNOSIS — E55.9 VITAMIN D DEFICIENCY: ICD-10-CM

## 2019-10-30 DIAGNOSIS — I10 BENIGN ESSENTIAL HYPERTENSION: Primary | ICD-10-CM

## 2019-11-06 ENCOUNTER — OFFICE VISIT (OUTPATIENT)
Dept: INTERNAL MEDICINE | Facility: CLINIC | Age: 79
End: 2019-11-06

## 2019-11-06 ENCOUNTER — LAB REQUISITION (OUTPATIENT)
Dept: LAB | Facility: HOSPITAL | Age: 79
End: 2019-11-06

## 2019-11-06 VITALS
HEART RATE: 82 BPM | WEIGHT: 145 LBS | SYSTOLIC BLOOD PRESSURE: 136 MMHG | DIASTOLIC BLOOD PRESSURE: 84 MMHG | BODY MASS INDEX: 26.68 KG/M2 | HEIGHT: 62 IN

## 2019-11-06 DIAGNOSIS — K21.9 GERD WITHOUT ESOPHAGITIS: ICD-10-CM

## 2019-11-06 DIAGNOSIS — E78.2 MIXED HYPERLIPIDEMIA: ICD-10-CM

## 2019-11-06 DIAGNOSIS — Z00.00 ROUTINE GENERAL MEDICAL EXAMINATION AT A HEALTH CARE FACILITY: ICD-10-CM

## 2019-11-06 DIAGNOSIS — E55.9 VITAMIN D DEFICIENCY: ICD-10-CM

## 2019-11-06 DIAGNOSIS — I70.0 ATHEROSCLEROSIS OF ABDOMINAL AORTA (HCC): ICD-10-CM

## 2019-11-06 DIAGNOSIS — I10 BENIGN ESSENTIAL HYPERTENSION: Primary | ICD-10-CM

## 2019-11-06 PROBLEM — H40.10X0 OPEN-ANGLE GLAUCOMA: Chronic | Status: ACTIVE | Noted: 2019-01-24

## 2019-11-06 PROCEDURE — 99214 OFFICE O/P EST MOD 30 MIN: CPT | Performed by: INTERNAL MEDICINE

## 2019-11-06 PROCEDURE — 36415 COLL VENOUS BLD VENIPUNCTURE: CPT | Performed by: INTERNAL MEDICINE

## 2019-11-06 RX ORDER — OMEGA-3-ACID ETHYL ESTERS 1 G/1
2 CAPSULE, LIQUID FILLED ORAL NIGHTLY
Qty: 180 CAPSULE | Refills: 3 | Status: SHIPPED | OUTPATIENT
Start: 2019-11-06 | End: 2020-08-04 | Stop reason: SDUPTHER

## 2019-11-06 RX ORDER — FAMOTIDINE 20 MG/1
20 TABLET, FILM COATED ORAL 2 TIMES DAILY
Qty: 180 TABLET | Refills: 1 | Status: SHIPPED | OUTPATIENT
Start: 2019-11-06 | End: 2020-04-27 | Stop reason: SDUPTHER

## 2019-11-06 NOTE — PATIENT INSTRUCTIONS
Patient Instructions   Problem List Items Addressed This Visit        Cardiovascular and Mediastinum    Atherosclerosis of abdominal aorta (CMS/HCC)    Overview     11/6/2019 Marie Soto MD    Continue daily coated 81mg aspirin and continue good blood pressure control.         Relevant Medications    aspirin 81 MG tablet    Benign essential hypertension    Overview     11/7/2019 Marie Soto MD    Continue metoprolol and hydrochlorothiazide.  Continue to avoid salt in the diet.  Avoid sodas.         Relevant Medications    metoprolol succinate XL (TOPROL-XL) 50 MG 24 hr tablet    hydrochlorothiazide (HYDRODIURIL) 12.5 MG tablet    Mixed hyperlipidemia    Overview     11/7/2019 Marie Soto MD    Continue simvastatin every evening.  Continue low-fat diet and regular exercise.         Relevant Medications    simvastatin (ZOCOR) 20 MG tablet    omega-3 acid ethyl esters (LOVAZA) 1 g capsule       Digestive    Vitamin D deficiency    Overview     11/7/2019 Marie Soto MD    Continue current dose.         GERD without esophagitis - Primary    Overview     11/7/2019 Marie Soto MD    Change ranitidine to famotidine twice a day.  Continue to avoid eating close to bedtime and avoid large meals.         Relevant Medications    famotidine (PEPCID) 20 MG tablet

## 2019-11-06 NOTE — PROGRESS NOTES
[facility]     Amanda Lawson  1940   [id number]      Patient Care Team:  Marie Soto MD as PCP - General  Marie Soto MD as PCP - Family Medicine  Holly Pierce MD as Consulting Physician (Ophthalmology)    Chief Complaint   Patient presents with   • Hypertension     f/u   • Hyperlipidemia            HPI  Patient is a 79 y.o. female presents with hypertension,hyperlipidemia    CHRONIC CONDITIONS    Recent corneal transplant. Doing well.    BP's at home are controlled. Takes meds regularly.    Eats low fat diet . Regular exercise. Takes statin.    For aortic atherosclerosis, takes ASA and BP meds.    GERD controlled on ranitidine.But her pharmacy told her it was recalled.    Past Medical History:   Diagnosis Date   • Bladder cancer (CMS/[facility]) 1990   • Plantar fasciitis 2010   • Rosacea    • Vasovagal syncope     since childhood       Past Surgical History:   Procedure Laterality Date   • ENDOMETRIAL BIOPSY  1983       Family History   Problem Relation Age of Onset   • Coronary artery disease Mother    • Hypertension Father    • Coronary artery disease Brother    • Hyperlipidemia Daughter    • Obesity Maternal Grandmother    • Mitral valve prolapse Daughter    • Breast cancer Neg Hx    • Ovarian cancer Neg Hx        Social History     Socioeconomic History   • Marital status:      Spouse name: Not on file   • Number of children: Not on file   • Years of education: Not on file   • Highest education level: Not on file   Tobacco Use   • Smoking status: Never Smoker   • Smokeless tobacco: Never Used   Substance and Sexual Activity   • Alcohol use: Yes     Alcohol/week: 0.6 oz     Types: 1 Shots of liquor per week   • Drug use: No   • Sexual activity: Defer       No Known Allergies    Review of Systems:     Review of Systems   Constitutional: Negative for chills, fatigue and fever.   Respiratory: Negative for cough, shortness of breath and wheezing.    Cardiovascular:  "Negative for chest pain, palpitations and leg swelling.   Gastrointestinal: Negative for abdominal pain, blood in stool, constipation and diarrhea.   Genitourinary: Negative for dysuria and frequency.   Musculoskeletal: Negative for back pain and gait problem.       Vital Signs  Vitals:    11/06/19 1121   BP: 136/84   BP Location: Left arm   Patient Position: Sitting   Cuff Size: Adult   Pulse: 82   Weight: 65.8 kg (145 lb)   Height: 157 cm (61.81\")   PainSc: 0-No pain     Body mass index is 26.68 kg/m².      Current Outpatient Medications:   •  aspirin 81 MG tablet, Take 1 tablet by mouth Daily., Disp: 90 tablet, Rfl: 1  •  CALCIUM-VITAMIN D PO, Take 1 tablet by mouth Daily., Disp: , Rfl:   •  Cholecalciferol (VITAMIN D PO), Take 1 tablet by mouth Daily., Disp: , Rfl:   •  Coenzyme Q10 (CO Q 10) 100 MG capsule, Take 1 capsule by mouth Daily., Disp: , Rfl:   •  hydrochlorothiazide (HYDRODIURIL) 12.5 MG tablet, Take 1 tablet by mouth Daily., Disp: 90 tablet, Rfl: 1  •  metoprolol succinate XL (TOPROL-XL) 50 MG 24 hr tablet, Take 1 tablet by mouth 2 (Two) Times a Day., Disp: 180 tablet, Rfl: 1  •  Multiple Vitamins-Minerals (SENIOR MULTIVITAMIN PLUS PO), Take 1 tablet by mouth Daily., Disp: , Rfl:   •  omega-3 acid ethyl esters (LOVAZA) 1 g capsule, Take 2 capsules by mouth Every Night., Disp: 180 capsule, Rfl: 3  •  simvastatin (ZOCOR) 20 MG tablet, Take 1 tablet by mouth Every Night., Disp: 90 tablet, Rfl: 1  •  sodium chloride (KARRIE 128) 5 % ophthalmic solution, Apply 1 drop to eye(s) as directed by provider Every Night., Disp: , Rfl:   •  TRAVATAN Z 0.004 % solution ophthalmic solution, Apply 1 drop to eye(s) as directed by provider Every Night., Disp: , Rfl:   •  famotidine (PEPCID) 20 MG tablet, Take 1 tablet by mouth 2 (Two) Times a Day., Disp: 180 tablet, Rfl: 1    Physical Exam:    Physical Exam   Constitutional: She is oriented to person, place, and time. She appears well-developed and well-nourished. "   HENT:   Head: Normocephalic.   Eyes: Conjunctivae and EOM are normal. Pupils are equal, round, and reactive to light.   Neck: Normal range of motion. Neck supple. No thyromegaly present.   Cardiovascular: Normal rate, regular rhythm, normal heart sounds and intact distal pulses.   Pulmonary/Chest: Effort normal and breath sounds normal.   Musculoskeletal: Normal range of motion. She exhibits no edema.   Lymphadenopathy:     She has no cervical adenopathy.   Neurological: She is alert and oriented to person, place, and time.   Psychiatric: She has a normal mood and affect. Thought content normal.   Nursing note and vitals reviewed.       ACE III MINI        Results Review:    None    CMP:  Lab Results   Component Value Date    GLU 95 11/06/2019    BUN 16 11/06/2019    CREATININE 0.83 11/06/2019    EGFRIFNONA 66 11/06/2019    EGFRIFAFRI 81 11/06/2019    BCR 19.3 11/06/2019     11/06/2019    K 4.3 11/06/2019    CO2 28.9 11/06/2019    CALCIUM 10.1 11/06/2019    PROTENTOTREF 7.2 11/06/2019    ALBUMIN 5.20 11/06/2019    LABGLOBREF 2.0 11/06/2019    LABIL2 2.6 11/06/2019    BILITOT 0.4 11/06/2019    ALKPHOS 70 11/06/2019    AST 27 11/06/2019    ALT 24 11/06/2019     HbA1c:  No results found for: HGBA1C  Microalbumin:  Lab Results   Component Value Date    MICROALBUR 34.4 11/06/2019     Lipid Panel  Lab Results   Component Value Date    CHOL 148 02/04/2019    TRIG 127 11/06/2019    HDL 62 (H) 11/06/2019    LDL 87 11/06/2019    AST 27 11/06/2019    ALT 24 11/06/2019       Medication Review: Medications reviewed and noted  Patient Instructions   Problem List Items Addressed This Visit        Cardiovascular and Mediastinum    Atherosclerosis of abdominal aorta (CMS/HCC)    Overview     11/6/2019 Marie Soto MD    Continue daily coated 81mg aspirin and continue good blood pressure control.         Relevant Medications    aspirin 81 MG tablet    Benign essential hypertension - Primary    Overview     11/7/2019  Marie Soto MD    Continue metoprolol and hydrochlorothiazide.  Continue to avoid salt in the diet.  Avoid sodas.         Relevant Medications    metoprolol succinate XL (TOPROL-XL) 50 MG 24 hr tablet    hydrochlorothiazide (HYDRODIURIL) 12.5 MG tablet    Mixed hyperlipidemia    Overview     11/7/2019 Marie Soto MD    Continue simvastatin every evening.  Continue low-fat diet and regular exercise.         Relevant Medications    simvastatin (ZOCOR) 20 MG tablet    omega-3 acid ethyl esters (LOVAZA) 1 g capsule       Digestive    Vitamin D deficiency    Overview     11/7/2019 Marie Soto MD    Continue current dose.         GERD without esophagitis    Overview     11/7/2019 Marie Soto MD    Change ranitidine to famotidine twice a day.  Continue to avoid eating close to bedtime and avoid large meals.         Relevant Medications    famotidine (PEPCID) 20 MG tablet             Diagnosis Plan   1. Benign essential hypertension  Microalbumin / Creatinine Urine Ratio -    Urinalysis without microscopic (no culture) -   2. Mixed hyperlipidemia  TSH    Lipid Panel    CBC & Differential    Comprehensive Metabolic Panel   3. GERD without esophagitis  famotidine (PEPCID) 20 MG tablet   4. Atherosclerosis of abdominal aorta (CMS/HCC)     5. Vitamin D deficiency  Vitamin D 25 Hydroxy       Patient Instructions       Patient Instructions   Problem List Items Addressed This Visit        Cardiovascular and Mediastinum    Atherosclerosis of abdominal aorta (CMS/HCC)    Overview     11/6/2019 Marie Soto MD    Continue daily coated 81mg aspirin and continue good blood pressure control.         Relevant Medications    aspirin 81 MG tablet    Benign essential hypertension    Overview     11/7/2019 Marie Soto MD    Continue metoprolol and hydrochlorothiazide.  Continue to avoid salt in the diet.  Avoid sodas.         Relevant Medications    metoprolol succinate XL (TOPROL-XL) 50 MG 24 hr tablet     hydrochlorothiazide (HYDRODIURIL) 12.5 MG tablet    Mixed hyperlipidemia    Overview     11/7/2019 Marie Soto MD    Continue simvastatin every evening.  Continue low-fat diet and regular exercise.         Relevant Medications    simvastatin (ZOCOR) 20 MG tablet    omega-3 acid ethyl esters (LOVAZA) 1 g capsule       Digestive    Vitamin D deficiency    Overview     11/7/2019 Marie Soto MD    Continue current dose.         GERD without esophagitis - Primary    Overview     11/7/2019 Marie Soto MD    Change ranitidine to famotidine twice a day.  Continue to avoid eating close to bedtime and avoid large meals.         Relevant Medications    famotidine (PEPCID) 20 MG tablet                 Plan of care reviewed with patient at the conclusion of today's visit. Education was provided regarding diagnosis, management, and any prescribed or recommended OTC medications.Patient verbalizes understanding of and agreement with management plan.         Marie Soto MD

## 2019-11-07 LAB
25(OH)D3+25(OH)D2 SERPL-MCNC: 56.7 NG/ML (ref 30–100)
ALBUMIN SERPL-MCNC: 5.2 G/DL (ref 3.5–5.2)
ALBUMIN/CREAT UR: 24.4 MG/G CREAT (ref 0–30)
ALBUMIN/GLOB SERPL: 2.6 G/DL
ALP SERPL-CCNC: 70 U/L (ref 39–117)
ALT SERPL-CCNC: 24 U/L (ref 1–33)
APPEARANCE UR: ABNORMAL
AST SERPL-CCNC: 27 U/L (ref 1–32)
BASOPHILS # BLD AUTO: 0.03 10*3/MM3 (ref 0–0.2)
BASOPHILS NFR BLD AUTO: 0.6 % (ref 0–1.5)
BILIRUB SERPL-MCNC: 0.4 MG/DL (ref 0.2–1.2)
BILIRUB UR QL STRIP: NEGATIVE
BUN SERPL-MCNC: 16 MG/DL (ref 8–23)
BUN/CREAT SERPL: 19.3 (ref 7–25)
CALCIUM SERPL-MCNC: 10.1 MG/DL (ref 8.6–10.5)
CHLORIDE SERPL-SCNC: 97 MMOL/L (ref 98–107)
CHOLEST SERPL-MCNC: 174 MG/DL (ref 0–200)
CO2 SERPL-SCNC: 28.9 MMOL/L (ref 22–29)
COLOR UR: YELLOW
CREAT SERPL-MCNC: 0.83 MG/DL (ref 0.57–1)
CREAT UR-MCNC: 141.1 MG/DL
EOSINOPHIL # BLD AUTO: 0.07 10*3/MM3 (ref 0–0.4)
EOSINOPHIL NFR BLD AUTO: 1.3 % (ref 0.3–6.2)
ERYTHROCYTE [DISTWIDTH] IN BLOOD BY AUTOMATED COUNT: 12.7 % (ref 12.3–15.4)
GLOBULIN SER CALC-MCNC: 2 GM/DL
GLUCOSE SERPL-MCNC: 95 MG/DL (ref 65–99)
GLUCOSE UR QL: NEGATIVE
HCT VFR BLD AUTO: 45.7 % (ref 34–46.6)
HDLC SERPL-MCNC: 62 MG/DL (ref 40–60)
HGB BLD-MCNC: 14.8 G/DL (ref 12–15.9)
HGB UR QL STRIP: NEGATIVE
IMM GRANULOCYTES # BLD AUTO: 0.02 10*3/MM3 (ref 0–0.05)
IMM GRANULOCYTES NFR BLD AUTO: 0.4 % (ref 0–0.5)
KETONES UR QL STRIP: NEGATIVE
LDLC SERPL CALC-MCNC: 87 MG/DL (ref 0–100)
LEUKOCYTE ESTERASE UR QL STRIP: ABNORMAL
LYMPHOCYTES # BLD AUTO: 1.86 10*3/MM3 (ref 0.7–3.1)
LYMPHOCYTES NFR BLD AUTO: 35.4 % (ref 19.6–45.3)
MCH RBC QN AUTO: 29.7 PG (ref 26.6–33)
MCHC RBC AUTO-ENTMCNC: 32.4 G/DL (ref 31.5–35.7)
MCV RBC AUTO: 91.8 FL (ref 79–97)
MICROALBUMIN UR-MCNC: 34.4 UG/ML
MONOCYTES # BLD AUTO: 0.52 10*3/MM3 (ref 0.1–0.9)
MONOCYTES NFR BLD AUTO: 9.9 % (ref 5–12)
NEUTROPHILS # BLD AUTO: 2.75 10*3/MM3 (ref 1.7–7)
NEUTROPHILS NFR BLD AUTO: 52.4 % (ref 42.7–76)
NITRITE UR QL STRIP: NEGATIVE
NRBC BLD AUTO-RTO: 0 /100 WBC (ref 0–0.2)
PH UR STRIP: 8 [PH] (ref 5–8)
PLATELET # BLD AUTO: 186 10*3/MM3 (ref 140–450)
POTASSIUM SERPL-SCNC: 4.3 MMOL/L (ref 3.5–5.2)
PROT SERPL-MCNC: 7.2 G/DL (ref 6–8.5)
PROT UR QL STRIP: ABNORMAL
RBC # BLD AUTO: 4.98 10*6/MM3 (ref 3.77–5.28)
SODIUM SERPL-SCNC: 141 MMOL/L (ref 136–145)
SP GR UR: 1.02 (ref 1–1.03)
TRIGL SERPL-MCNC: 127 MG/DL (ref 0–150)
TSH SERPL DL<=0.005 MIU/L-ACNC: 2.49 UIU/ML (ref 0.27–4.2)
UROBILINOGEN UR STRIP-MCNC: ABNORMAL MG/DL
VLDLC SERPL CALC-MCNC: 25.4 MG/DL
WBC # BLD AUTO: 5.25 10*3/MM3 (ref 3.4–10.8)

## 2019-12-04 ENCOUNTER — TELEPHONE (OUTPATIENT)
Dept: INTERNAL MEDICINE | Facility: CLINIC | Age: 79
End: 2019-12-04

## 2019-12-04 NOTE — TELEPHONE ENCOUNTER
DEXA ordered by you 4-19-19.   Central scheduling has not been able to reach the patient to schedule test.  Swathi documented on 5/7/19 patient to schedule.   I have sent the patient 3 [employer] messages 5/21,6/13 & 9/11 to remind her to schedule DEXA.  All messages read by the patient and she was seen in office 11/6/19.  Test still has not been scheduled,   Can this order be canceled?

## 2020-01-31 DIAGNOSIS — E78.2 MIXED HYPERLIPIDEMIA: ICD-10-CM

## 2020-01-31 RX ORDER — SIMVASTATIN 20 MG
TABLET ORAL
Qty: 90 TABLET | Refills: 0 | Status: SHIPPED | OUTPATIENT
Start: 2020-01-31 | End: 2020-04-27 | Stop reason: SDUPTHER

## 2020-03-04 ENCOUNTER — TRANSCRIBE ORDERS (OUTPATIENT)
Dept: ADMINISTRATIVE | Facility: HOSPITAL | Age: 80
End: 2020-03-04

## 2020-03-04 DIAGNOSIS — Z12.31 VISIT FOR SCREENING MAMMOGRAM: Primary | ICD-10-CM

## 2020-04-27 DIAGNOSIS — E78.2 MIXED HYPERLIPIDEMIA: ICD-10-CM

## 2020-04-27 DIAGNOSIS — K21.9 GERD WITHOUT ESOPHAGITIS: ICD-10-CM

## 2020-04-27 RX ORDER — SIMVASTATIN 20 MG
20 TABLET ORAL
Qty: 90 TABLET | Refills: 1 | Status: SHIPPED | OUTPATIENT
Start: 2020-04-27 | End: 2020-11-10 | Stop reason: SDUPTHER

## 2020-04-27 RX ORDER — FAMOTIDINE 20 MG/1
20 TABLET, FILM COATED ORAL 2 TIMES DAILY
Qty: 180 TABLET | Refills: 1 | Status: SHIPPED | OUTPATIENT
Start: 2020-04-27 | End: 2021-02-01

## 2020-04-28 RX ORDER — METOPROLOL SUCCINATE 50 MG/1
50 TABLET, EXTENDED RELEASE ORAL 2 TIMES DAILY
Qty: 180 TABLET | Refills: 1 | Status: SHIPPED | OUTPATIENT
Start: 2020-04-28 | End: 2020-11-10 | Stop reason: SDUPTHER

## 2020-04-28 RX ORDER — HYDROCHLOROTHIAZIDE 12.5 MG/1
12.5 TABLET ORAL DAILY
Qty: 90 TABLET | Refills: 1 | Status: SHIPPED | OUTPATIENT
Start: 2020-04-28 | End: 2020-11-10 | Stop reason: SDUPTHER

## 2020-05-08 ENCOUNTER — APPOINTMENT (OUTPATIENT)
Dept: MAMMOGRAPHY | Facility: HOSPITAL | Age: 80
End: 2020-05-08

## 2020-05-14 ENCOUNTER — APPOINTMENT (OUTPATIENT)
Dept: CT IMAGING | Facility: HOSPITAL | Age: 80
End: 2020-05-14

## 2020-05-14 ENCOUNTER — HOSPITAL ENCOUNTER (EMERGENCY)
Facility: HOSPITAL | Age: 80
Discharge: HOME | End: 2020-05-14
Attending: EMERGENCY MEDICINE | Admitting: EMERGENCY MEDICINE

## 2020-05-14 ENCOUNTER — EPISODE CHANGES (OUTPATIENT)
Dept: CASE MANAGEMENT | Facility: OTHER | Age: 80
End: 2020-05-14

## 2020-05-14 VITALS
DIASTOLIC BLOOD PRESSURE: 62 MMHG | BODY MASS INDEX: 25.76 KG/M2 | HEIGHT: 62 IN | HEART RATE: 73 BPM | TEMPERATURE: 97.8 F | RESPIRATION RATE: 16 BRPM | SYSTOLIC BLOOD PRESSURE: 127 MMHG | OXYGEN SATURATION: 97 % | WEIGHT: 140 LBS

## 2020-05-14 DIAGNOSIS — S01.111A EYEBROW LACERATION, RIGHT, INITIAL ENCOUNTER: Primary | ICD-10-CM

## 2020-05-14 DIAGNOSIS — R55 SYNCOPE AND COLLAPSE: ICD-10-CM

## 2020-05-14 LAB
ALBUMIN SERPL-MCNC: 4.9 G/DL (ref 3.5–5.2)
ALBUMIN/GLOB SERPL: 2.3 G/DL
ALP SERPL-CCNC: 68 U/L (ref 39–117)
ALT SERPL W P-5'-P-CCNC: 27 U/L (ref 1–33)
ANION GAP SERPL CALCULATED.3IONS-SCNC: 13 MMOL/L (ref 5–15)
AST SERPL-CCNC: 31 U/L (ref 1–32)
BASOPHILS # BLD AUTO: 0.02 10*3/MM3 (ref 0–0.2)
BASOPHILS NFR BLD AUTO: 0.2 % (ref 0–1.5)
BILIRUB SERPL-MCNC: 0.4 MG/DL (ref 0.2–1.2)
BUN BLD-MCNC: 19 MG/DL (ref 8–23)
BUN/CREAT SERPL: 20.4 (ref 7–25)
CALCIUM SPEC-SCNC: 9.8 MG/DL (ref 8.6–10.5)
CHLORIDE SERPL-SCNC: 94 MMOL/L (ref 98–107)
CO2 SERPL-SCNC: 29 MMOL/L (ref 22–29)
CREAT BLD-MCNC: 0.93 MG/DL (ref 0.57–1)
DEPRECATED RDW RBC AUTO: 41.1 FL (ref 37–54)
EOSINOPHIL # BLD AUTO: 0.04 10*3/MM3 (ref 0–0.4)
EOSINOPHIL NFR BLD AUTO: 0.5 % (ref 0.3–6.2)
ERYTHROCYTE [DISTWIDTH] IN BLOOD BY AUTOMATED COUNT: 12.4 % (ref 12.3–15.4)
GFR SERPL CREATININE-BSD FRML MDRD: 58 ML/MIN/1.73
GLOBULIN UR ELPH-MCNC: 2.1 GM/DL
GLUCOSE BLD-MCNC: 137 MG/DL (ref 65–99)
HCT VFR BLD AUTO: 44.4 % (ref 34–46.6)
HGB BLD-MCNC: 14.7 G/DL (ref 12–15.9)
HOLD SPECIMEN: NORMAL
HOLD SPECIMEN: NORMAL
IMM GRANULOCYTES # BLD AUTO: 0.04 10*3/MM3 (ref 0–0.05)
IMM GRANULOCYTES NFR BLD AUTO: 0.5 % (ref 0–0.5)
LYMPHOCYTES # BLD AUTO: 1.25 10*3/MM3 (ref 0.7–3.1)
LYMPHOCYTES NFR BLD AUTO: 15.1 % (ref 19.6–45.3)
MAGNESIUM SERPL-MCNC: 2 MG/DL (ref 1.6–2.4)
MCH RBC QN AUTO: 30.2 PG (ref 26.6–33)
MCHC RBC AUTO-ENTMCNC: 33.1 G/DL (ref 31.5–35.7)
MCV RBC AUTO: 91.4 FL (ref 79–97)
MONOCYTES # BLD AUTO: 0.59 10*3/MM3 (ref 0.1–0.9)
MONOCYTES NFR BLD AUTO: 7.1 % (ref 5–12)
NEUTROPHILS # BLD AUTO: 6.35 10*3/MM3 (ref 1.7–7)
NEUTROPHILS NFR BLD AUTO: 76.6 % (ref 42.7–76)
NRBC BLD AUTO-RTO: 0 /100 WBC (ref 0–0.2)
PLATELET # BLD AUTO: 166 10*3/MM3 (ref 140–450)
PMV BLD AUTO: 10.5 FL (ref 6–12)
POTASSIUM BLD-SCNC: 4.1 MMOL/L (ref 3.5–5.2)
PROT SERPL-MCNC: 7 G/DL (ref 6–8.5)
RBC # BLD AUTO: 4.86 10*6/MM3 (ref 3.77–5.28)
SODIUM BLD-SCNC: 136 MMOL/L (ref 136–145)
TROPONIN T SERPL-MCNC: <0.01 NG/ML (ref 0–0.03)
WBC NRBC COR # BLD: 8.29 10*3/MM3 (ref 3.4–10.8)
WHOLE BLOOD HOLD SPECIMEN: NORMAL
WHOLE BLOOD HOLD SPECIMEN: NORMAL

## 2020-05-14 PROCEDURE — 90715 TDAP VACCINE 7 YRS/> IM: CPT | Performed by: NURSE PRACTITIONER

## 2020-05-14 PROCEDURE — 25010000002 TDAP 5-2.5-18.5 LF-MCG/0.5 SUSPENSION: Performed by: NURSE PRACTITIONER

## 2020-05-14 PROCEDURE — 70486 CT MAXILLOFACIAL W/O DYE: CPT

## 2020-05-14 PROCEDURE — 85025 COMPLETE CBC W/AUTO DIFF WBC: CPT | Performed by: EMERGENCY MEDICINE

## 2020-05-14 PROCEDURE — 83735 ASSAY OF MAGNESIUM: CPT | Performed by: EMERGENCY MEDICINE

## 2020-05-14 PROCEDURE — 84484 ASSAY OF TROPONIN QUANT: CPT | Performed by: EMERGENCY MEDICINE

## 2020-05-14 PROCEDURE — 80053 COMPREHEN METABOLIC PANEL: CPT | Performed by: EMERGENCY MEDICINE

## 2020-05-14 PROCEDURE — 93005 ELECTROCARDIOGRAM TRACING: CPT | Performed by: EMERGENCY MEDICINE

## 2020-05-14 PROCEDURE — 90471 IMMUNIZATION ADMIN: CPT | Performed by: NURSE PRACTITIONER

## 2020-05-14 PROCEDURE — 99284 EMERGENCY DEPT VISIT MOD MDM: CPT

## 2020-05-14 RX ORDER — AMOXICILLIN AND CLAVULANATE POTASSIUM 875; 125 MG/1; MG/1
1 TABLET, FILM COATED ORAL 2 TIMES DAILY
Qty: 14 TABLET | Refills: 0 | Status: SHIPPED | OUTPATIENT
Start: 2020-05-14 | End: 2020-05-22

## 2020-05-14 RX ORDER — SODIUM CHLORIDE 0.9 % (FLUSH) 0.9 %
10 SYRINGE (ML) INJECTION AS NEEDED
Status: DISCONTINUED | OUTPATIENT
Start: 2020-05-14 | End: 2020-05-14 | Stop reason: HOSPADM

## 2020-05-14 RX ORDER — AMOXICILLIN AND CLAVULANATE POTASSIUM 875; 125 MG/1; MG/1
1 TABLET, FILM COATED ORAL 2 TIMES DAILY
Qty: 20 TABLET | Refills: 0 | Status: SHIPPED | OUTPATIENT
Start: 2020-05-14 | End: 2020-05-14 | Stop reason: SDUPTHER

## 2020-05-14 RX ORDER — LIDOCAINE HYDROCHLORIDE 20 MG/ML
10 INJECTION, SOLUTION INFILTRATION; PERINEURAL ONCE
Status: COMPLETED | OUTPATIENT
Start: 2020-05-14 | End: 2020-05-14

## 2020-05-14 RX ADMIN — TETANUS TOXOID, REDUCED DIPHTHERIA TOXOID AND ACELLULAR PERTUSSIS VACCINE, ADSORBED 0.5 ML: 5; 2.5; 8; 8; 2.5 SUSPENSION INTRAMUSCULAR at 12:34

## 2020-05-14 RX ADMIN — LIDOCAINE HYDROCHLORIDE 10 ML: 20 INJECTION, SOLUTION INFILTRATION; PERINEURAL at 14:20

## 2020-05-14 NOTE — ED PROVIDER NOTES
"Subjective   Patient presents to the ED after sustaining syncope and collapse at home and subsequently sustaining a nasal contusion and laceration over her right eyebrow region.  Bleeding is been controlled.  Patient is verbose and is quick to inform us that she \"faints all the time\".  Patient typically has a prodrome experience and recognizes when she is going to experience a vasovagal syncope event.  Typically, she can thwart the event by sitting and resting.  While she was working on her porch outdoors just prior to arrival, she felt herself becoming hot and slightly diaphoretic.  As she tried to enter her home, she collapsed. She roused to discover that the rim of her eyeglasses faciliated the lac over her eye.  She denies eye pain or foreign body sensation or visual changes.   Patient states that she has been evaluated for such events thoroughly and has experienced them since she was age 19.  She has had no recent medication changes.  She denies any chest pain or shortness of breath.  She has no neurosensory complaint or focal weakness.  Patient denies other head injury, neck pain or spine pain.          Head Laceration   Location:  Over her right eyebrow region  Quality:  Chestnutridge-shaped  Severity:  Mild  Onset quality:  Sudden  Duration:  1 hour  Chronicity:  New  Associated symptoms: loss of consciousness (see HPI)    Associated symptoms: no myalgias, no nausea, no shortness of breath, no vomiting and no wheezing        Review of Systems   Constitutional: Negative.    HENT: Negative.    Eyes: Negative.  Negative for photophobia, pain and visual disturbance.   Respiratory: Negative.  Negative for shortness of breath and wheezing.    Gastrointestinal: Negative for nausea and vomiting.   Endocrine: Negative.    Genitourinary: Negative.    Musculoskeletal: Negative for back pain, gait problem, myalgias, neck pain and neck stiffness.   Neurological: Positive for loss of consciousness (see HPI) and syncope. "   Hematological: Bruises/bleeds easily (on baby aspirin daily).   Psychiatric/Behavioral: Negative.    All other systems reviewed and are negative.      Past Medical History:   Diagnosis Date   • Bladder cancer (CMS/HCC) 1990   • Plantar fasciitis 2010   • Rosacea    • Vasovagal syncope     since childhood       No Known Allergies    Past Surgical History:   Procedure Laterality Date   • ENDOMETRIAL BIOPSY  1983       Family History   Problem Relation Age of Onset   • Coronary artery disease Mother    • Hypertension Father    • Coronary artery disease Brother    • Hyperlipidemia Daughter    • Obesity Maternal Grandmother    • Mitral valve prolapse Daughter    • Breast cancer Neg Hx    • Ovarian cancer Neg Hx        Social History     Socioeconomic History   • Marital status:      Spouse name: Not on file   • Number of children: Not on file   • Years of education: Not on file   • Highest education level: Not on file   Tobacco Use   • Smoking status: Never Smoker   • Smokeless tobacco: Never Used   Substance and Sexual Activity   • Alcohol use: Yes     Alcohol/week: 1.0 standard drinks     Types: 1 Shots of liquor per week   • Drug use: No   • Sexual activity: Defer           Objective   Physical Exam   Constitutional: She is oriented to person, place, and time. She appears well-developed and well-nourished. No distress.   Patient is an excellent historian.  Her vital signs are stable.   HENT:   Right Ear: External ear normal.   Left Ear: External ear normal.   Mouth/Throat: Oropharynx is clear and moist.   Patient has a 3.5 cm crescent shaped laceration following her eyebrow.  Bleeding is controlled.  Laceration is full-thickness without any local hematoma.  Extraocular movements are intact.  She has no pain otherwise at the orbital rim.  Proximal nose has some soft tissue swelling and ecchymosis.  There is no septal hematoma.  There is no current epistaxis.   Eyes: Conjunctivae are normal. No scleral icterus.    No subconjunctival hemorrhage.  No hyphema   Neck: No JVD present.   Patient demonstrates full range of motion without limitation or tenderness to palpation   Cardiovascular: Normal rate and regular rhythm.   Pulmonary/Chest: Effort normal and breath sounds normal.   Abdominal: Soft. Bowel sounds are normal.   Musculoskeletal:   All extremities are atraumatic and unremarkable.  Pelvis is stable.  Straight leg raise is negative bilaterally.   Neurological: She is alert and oriented to person, place, and time.   No neurosensory deficits or focal weakness.   Skin: Skin is warm and dry. Capillary refill takes less than 2 seconds. She is not diaphoretic.   Psychiatric: She has a normal mood and affect. Her behavior is normal. Judgment and thought content normal.   Very pleasant and well spoken she is an excellent historian   Vitals reviewed.      Laceration Repair  Date/Time: 5/14/2020 4:14 PM  Performed by: Rufina Vega APRN  Authorized by: Danis Baldwin MD     Consent:     Consent obtained:  Verbal    Consent given by:  Patient    Risks discussed:  Infection and poor cosmetic result  Anesthesia (see MAR for exact dosages):     Anesthesia method:  Local infiltration    Local anesthetic:  Lidocaine 2% w/o epi  Laceration details:     Location: At the forehead over the right eyebrow.    Wound length (cm): 3.5.  Repair type:     Repair type:  Simple  Exploration:     Contaminated: no    Treatment:     Area cleansed with:  Betadine    Amount of cleaning:  Standard  Skin repair:     Repair method:  Sutures    Suture size:  6-0    Suture material:  Nylon    Suture technique:  Simple interrupted    Number of sutures:  7  Approximation:     Approximation:  Close  Post-procedure details:     Dressing:  Open (no dressing)    Patient tolerance of procedure:  Tolerated well, no immediate complications               ED Course  ED Course as of May 14 1615   Thu May 14, 2020   1157 Patient wants to defer CXR.  In fact,  patient wants to defer any blood work related to her event of syncope.  Patient is adamant that she recognizes this event as common for her.  With some gentle persuasion, she agrees to a work-up with exception of chest x-ray.    [MS]   1351 Patient's work-up is reassuring.  She is alert and verbose.  Laceration has been repaired.  We discussed parameters for concern that would warrant return to the emergency department.  I have advised her to hold any aspirin for 3 full days.  She agrees to follow-up with otolaryngology to help monitor her nondisplaced nasal fracture and ensure she does not develop a septal hematoma.  Will initiate antibiotics to help prevent any bone related infection and the patient also understands this rationale.  She will follow-up with Dr. Soto to remove her sutures in 7 days.      [MS]      ED Course User Index  [MS] Silvia Rufinalexi Molina, RUY            Recent Results (from the past 24 hour(s))   Comprehensive Metabolic Panel    Collection Time: 05/14/20 11:49 AM   Result Value Ref Range    Glucose 137 (H) 65 - 99 mg/dL    BUN 19 8 - 23 mg/dL    Creatinine 0.93 0.57 - 1.00 mg/dL    Sodium 136 136 - 145 mmol/L    Potassium 4.1 3.5 - 5.2 mmol/L    Chloride 94 (L) 98 - 107 mmol/L    CO2 29.0 22.0 - 29.0 mmol/L    Calcium 9.8 8.6 - 10.5 mg/dL    Total Protein 7.0 6.0 - 8.5 g/dL    Albumin 4.90 3.50 - 5.20 g/dL    ALT (SGPT) 27 1 - 33 U/L    AST (SGOT) 31 1 - 32 U/L    Alkaline Phosphatase 68 39 - 117 U/L    Total Bilirubin 0.4 0.2 - 1.2 mg/dL    eGFR Non African Amer 58 (L) >60 mL/min/1.73    Globulin 2.1 gm/dL    A/G Ratio 2.3 g/dL    BUN/Creatinine Ratio 20.4 7.0 - 25.0    Anion Gap 13.0 5.0 - 15.0 mmol/L   Magnesium    Collection Time: 05/14/20 11:49 AM   Result Value Ref Range    Magnesium 2.0 1.6 - 2.4 mg/dL   Troponin    Collection Time: 05/14/20 11:49 AM   Result Value Ref Range    Troponin T <0.010 0.000 - 0.030 ng/mL   Light Blue Top    Collection Time: 05/14/20 11:49 AM   Result  Value Ref Range    Extra Tube hold for add-on    Green Top (Gel)    Collection Time: 05/14/20 11:49 AM   Result Value Ref Range    Extra Tube Hold for add-ons.    Lavender Top    Collection Time: 05/14/20 11:49 AM   Result Value Ref Range    Extra Tube hold for add-on    Gold Top - SST    Collection Time: 05/14/20 11:49 AM   Result Value Ref Range    Extra Tube Hold for add-ons.    CBC Auto Differential    Collection Time: 05/14/20 11:49 AM   Result Value Ref Range    WBC 8.29 3.40 - 10.80 10*3/mm3    RBC 4.86 3.77 - 5.28 10*6/mm3    Hemoglobin 14.7 12.0 - 15.9 g/dL    Hematocrit 44.4 34.0 - 46.6 %    MCV 91.4 79.0 - 97.0 fL    MCH 30.2 26.6 - 33.0 pg    MCHC 33.1 31.5 - 35.7 g/dL    RDW 12.4 12.3 - 15.4 %    RDW-SD 41.1 37.0 - 54.0 fl    MPV 10.5 6.0 - 12.0 fL    Platelets 166 140 - 450 10*3/mm3    Neutrophil % 76.6 (H) 42.7 - 76.0 %    Lymphocyte % 15.1 (L) 19.6 - 45.3 %    Monocyte % 7.1 5.0 - 12.0 %    Eosinophil % 0.5 0.3 - 6.2 %    Basophil % 0.2 0.0 - 1.5 %    Immature Grans % 0.5 0.0 - 0.5 %    Neutrophils, Absolute 6.35 1.70 - 7.00 10*3/mm3    Lymphocytes, Absolute 1.25 0.70 - 3.10 10*3/mm3    Monocytes, Absolute 0.59 0.10 - 0.90 10*3/mm3    Eosinophils, Absolute 0.04 0.00 - 0.40 10*3/mm3    Basophils, Absolute 0.02 0.00 - 0.20 10*3/mm3    Immature Grans, Absolute 0.04 0.00 - 0.05 10*3/mm3    nRBC 0.0 0.0 - 0.2 /100 WBC     Note: In addition to lab results from this visit, the labs listed above may include labs taken at another facility or during a different encounter within the last 24 hours. Please correlate lab times with ED admission and discharge times for further clarification of the services performed during this visit.    CT Maxillofacial Without Contrast   Preliminary Result   Acute minimally displaced nasal bone fractures left greater   than right without depression or nasal septal fracture of involvement.   Orbital rims preserved without orbital fracture. Paranasal sinuses   clear.                 Vitals:    05/14/20 1253 05/14/20 1254 05/14/20 1300 05/14/20 1428   BP: 154/84 157/77 129/58 127/62   BP Location:    Right arm   Patient Position: Standing Sitting  Lying   Pulse: 80 84 76 73   Resp:    16   Temp:       SpO2:   96% 97%   Weight:       Height:         Medications   sodium chloride 0.9 % flush 10 mL (has no administration in time range)   Tdap (BOOSTRIX) injection 0.5 mL (0.5 mL Intramuscular Given 5/14/20 1234)   lidocaine (XYLOCAINE) 2% injection 10 mL (10 mL Injection Given 5/14/20 1420)     ECG/EMG Results (last 24 hours)     Procedure Component Value Units Date/Time    ECG 12 Lead [[id number]] Collected:  05/14/20 1144     Updated:  05/14/20 1340    Narrative:       Test Reason : Syncope triage protocol  Blood Pressure : **/** mmHG  Vent. Rate : 081 BPM     Atrial Rate : 081 BPM     P-R Int : 148 ms          QRS Dur : 108 ms      QT Int : 376 ms       P-R-T Axes : 074 043 036 degrees     QTc Int : 436 ms    Normal sinus rhythm  Nonspecific ST abnormality  Abnormal ECG  No previous ECGs available  Confirmed by CHEVY HUYNH MD ([id number]) on 5/14/2020 1:40:07 PM    Referred By:  EDMD           Confirmed By:CHEVY HUYNH MD        ECG 12 Lead   Final Result   Test Reason : Syncope triage protocol   Blood Pressure : **/** mmHG   Vent. Rate : 081 BPM     Atrial Rate : 081 BPM      P-R Int : 148 ms          QRS Dur : 108 ms       QT Int : 376 ms       P-R-T Axes : 074 043 036 degrees      QTc Int : 436 ms      Normal sinus rhythm   Nonspecific ST abnormality   Abnormal ECG   No previous ECGs available   Confirmed by CHEVY HUYNH MD ([id number]) on 5/14/2020 1:40:07 PM      Referred By:  EDMD           Confirmed By:CHEVY HUYNH MD                                            [facility]    Final diagnoses:   Eyebrow laceration, right, initial encounter   Syncope and collapse            Rufina Vega, RUY  05/14/20 2390

## 2020-05-14 NOTE — DISCHARGE INSTRUCTIONS
Strict skin care to your wound.  You may use soap and water daily and apply a smear of Neosporin each day.  See your primary care provider Dr. Soto in 7 days for suture removal.  Additionally, call the otolaryngologist, Dr. Faustin tomorrow for a follow-up appointment within a week to monitor your recovery from the nasal fracture.  In the meantime, take the antibiotics to help prevent infection and hold your aspirin for 3 full days.  Cool compresses can be helpful to control bleeding under your skin.  Take Tylenol as needed for discomfort.  Return to the emergency department as needed for worsening symptoms or concerns.  Maintain your best hydration and nutrition.  Thank you

## 2020-05-15 ENCOUNTER — PATIENT OUTREACH (OUTPATIENT)
Dept: CASE MANAGEMENT | Facility: OTHER | Age: 80
End: 2020-05-15

## 2020-05-15 NOTE — OUTREACH NOTE
Care Plan Note      Responses   Annual Wellness Visit:   Patient Will Schedule   Care Gaps Addressed  Flu Shot, Pneumonia Vaccine   Flu Shot Status  Up to Date   Pneumonia Vaccine Status  Up to Date   Other Patient Education/Resources   24/7 [facility] Nurse Call Line, Yenifer   24/7 Nurse Call Line Education Method  Verbal   [employer] Education Method  -- [active]   Does patient have depression diagnosis?  No   Advanced Directives:  Patient Has [Encd to bring copy to [facility] in July]   Ed Visits past 12 months:  1   Hospitalizations past 12 months  None   Medication Adherence  Medications understood   Health Literacy  Excellent        The main concerns and/or symptoms the patient would like to address are: ED visit 5/14/20 after sustaining syncope and collapse at home and subsequently sustaining a nasal contusion and laceration over her right eyebrow region. Pt states she is doing well, some discomfort. Slept well last night. Taking abx as directed and educated to take until completion. Holding her aspirin for 3 full days as directed. Scheduled with pcp for suture removal 5/29 which is in 2 weeks, however ED instructed to have sutures removed in 7 days. Pt thought appt was scheduled for 5/22. ACM will assist in rescheduling. Pt lives alone, still drives, and is independent with ADL's and doesn't use assist device with ambulation. She said she has had these passing out events since the 8th grade however this was the first in 3 years. She said before yesterday's event she had been working outside, when the temp outside was over 80 degrees.    Education/instruction provided by Care Coordinator: Educated to stay well hydrated by drinking plenty of water especially when working outside in hot weather. Explained/provided 24/7 nurse line.    Follow Up Outreach Due: as needed    Ana Cardoza, [facility]CRATES  Ambulatory     5/15/2020, 12:08

## 2020-05-15 NOTE — OUTREACH NOTE
Care Coordination Assessment    Documented/Reviewed By:  Ana Cardoza RN Date/time:  5/15/2020 11:48 AM   Assessment completed with:  patient  Enrolled in care management program:  No  Living arrangement:  alone  Support system:  children  Type of residence:  private residence  Equipment used at home:  none  Bed or wheelchair confined:  No  Inadequate nutrition:  No  Medication adherence problem:  No  Experiencing side effects from current medications:  No  History of fall(s) in last 6 months:  Yes  Difficulty keeping appointments:  No  Family aware of the patient's advance care planning wishes:  Yes (Comment: States has living will. Encd to bring copy to [facility] in July.)

## 2020-05-15 NOTE — OUTREACH NOTE
Care Coordination Note    Called Dr Marie Soto's office and rescheduled appt for 5/22 at 12:45pm. Pt notified.    Ana Cardoza RN  Ambulatory     5/15/2020, 12:37

## 2020-05-22 ENCOUNTER — OFFICE VISIT (OUTPATIENT)
Dept: INTERNAL MEDICINE | Facility: CLINIC | Age: 80
End: 2020-05-22

## 2020-05-22 VITALS
TEMPERATURE: 98.1 F | HEIGHT: 62 IN | DIASTOLIC BLOOD PRESSURE: 80 MMHG | BODY MASS INDEX: 26.72 KG/M2 | HEART RATE: 76 BPM | WEIGHT: 145.2 LBS | SYSTOLIC BLOOD PRESSURE: 156 MMHG

## 2020-05-22 DIAGNOSIS — S02.2XXD CLOSED FRACTURE OF NASAL BONE WITH ROUTINE HEALING: ICD-10-CM

## 2020-05-22 DIAGNOSIS — I10 BENIGN ESSENTIAL HYPERTENSION: ICD-10-CM

## 2020-05-22 DIAGNOSIS — S00.10XA: ICD-10-CM

## 2020-05-22 DIAGNOSIS — Z91.81 RISK FOR FALLS: ICD-10-CM

## 2020-05-22 DIAGNOSIS — S01.81XA LACERATION OF BROW WITHOUT COMPLICATION, INITIAL ENCOUNTER: ICD-10-CM

## 2020-05-22 DIAGNOSIS — R55 VASOVAGAL SYNCOPE: Primary | ICD-10-CM

## 2020-05-22 PROCEDURE — 99214 OFFICE O/P EST MOD 30 MIN: CPT | Performed by: INTERNAL MEDICINE

## 2020-05-22 NOTE — PROGRESS NOTES
[facility]     Amanda Lawson  1940   [id number]      Patient Care Team:  Marie Soto MD as PCP - General  Marie Soto MD as PCP - Family Medicine  Holly Pierce MD as Consulting Physician (Ophthalmology)    Chief Complaint   Patient presents with   • Suture / Staple Removal     above right eye   • Hypertension            HPI  Patient is a 79 y.o. female presents with syncope. Onset of symptoms was abrupt starting 8 days ago.  Chronicity acute. Severity severe.  Symptoms are associated with laceration on right brow, left nasal fracture, bilateral black eyes nasal pain.  She had a mild headache for a couple of days after the incident been that resolved.  No confusion or memory loss.  She began feeling hot and tired while working outside on a hot day without drinking extra fluids.  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  She states that she started feeling hot and tired and a little lightheaded but did not stop and go inside when she should have it.  Pertinent negatives no further episodes of syncope or presyncope since then.   Symptoms are aggravated by lack of fluid intake, working outside bending over for a long period time.   Symptoms improve with patient was seen in the ER at [facility] on 5/14/2020 and had stitches in the right eyebrow laceration.  Imaging showed a left nasal fracture that was nondisplaced.  Context she does have a history of syncopal episodes in the past that were consistent with vasovagal episodes.       CHRONIC CONDITIONS  BP's at home -runs about 127/70 to 140/80 at the most.Takes meds regularly.     Past Medical History:   Diagnosis Date   • Bladder cancer (CMS/HCC) 1990   • Plantar fasciitis 2010   • Rosacea    • Vasovagal syncope     since childhood       Past Surgical History:   Procedure Laterality Date   • ENDOMETRIAL BIOPSY  1983       Family  "History   Problem Relation Age of Onset   • Coronary artery disease Mother    • Hypertension Father    • Coronary artery disease Brother    • Hyperlipidemia Daughter    • Obesity Maternal Grandmother    • Mitral valve prolapse Daughter    • Breast cancer Neg Hx    • Ovarian cancer Neg Hx        Social History     Socioeconomic History   • Marital status:      Spouse name: Not on file   • Number of children: Not on file   • Years of education: Not on file   • Highest education level: Not on file   Tobacco Use   • Smoking status: Never Smoker   • Smokeless tobacco: Never Used   Substance and Sexual Activity   • Alcohol use: Not Currently   • Drug use: No   • Sexual activity: Defer       No Known Allergies    Review of Systems:     Review of Systems   Constitutional: Negative for chills, fatigue and fever.   HENT: Negative for congestion, ear pain, postnasal drip, rhinorrhea, sinus pressure and sore throat.    Respiratory: Negative for cough, chest tightness, shortness of breath and wheezing.    Cardiovascular: Negative for chest pain and palpitations.   Gastrointestinal: Negative for abdominal pain, blood in stool and constipation.   Skin: Positive for color change and bruise.        Wound on right eyebrow   Allergic/Immunologic: Negative for environmental allergies.   Neurological: Positive for headache. Negative for dizziness, facial asymmetry, speech difficulty, weakness, light-headedness, memory problem and confusion.   Psychiatric/Behavioral: Negative for decreased concentration. The patient is not nervous/anxious.        Vital Signs  Vitals:    05/22/20 1256   BP: 156/80   BP Location: Left arm   Patient Position: Sitting   Cuff Size: Adult   Pulse: 76   Temp: 98.1 °F (36.7 °C)   TempSrc: Temporal   Weight: 65.9 kg (145 lb 3.2 oz)   Height: 157.5 cm (62\")   PainSc: 0-No pain     Body mass index is 26.56 kg/m².      Current Outpatient Medications:   •  aspirin 81 MG tablet, Take 1 tablet by mouth Daily., " Disp: 90 tablet, Rfl: 1  •  CALCIUM-VITAMIN D PO, Take 1 tablet by mouth Daily., Disp: , Rfl:   •  Cholecalciferol (VITAMIN D PO), Take 1 tablet by mouth Daily., Disp: , Rfl:   •  Coenzyme Q10 (CO Q 10) 100 MG capsule, Take 1 capsule by mouth Daily., Disp: , Rfl:   •  famotidine (Pepcid) 20 MG tablet, Take 1 tablet by mouth 2 (Two) Times a Day., Disp: 180 tablet, Rfl: 1  •  hydroCHLOROthiazide (HYDRODIURIL) 12.5 MG tablet, Take 1 tablet by mouth Daily., Disp: 90 tablet, Rfl: 1  •  metoprolol succinate XL (TOPROL-XL) 50 MG 24 hr tablet, Take 1 tablet by mouth 2 (Two) Times a Day., Disp: 180 tablet, Rfl: 1  •  Multiple Vitamins-Minerals (SENIOR MULTIVITAMIN PLUS PO), Take 1 tablet by mouth Daily., Disp: , Rfl:   •  omega-3 acid ethyl esters (LOVAZA) 1 g capsule, Take 2 capsules by mouth Every Night., Disp: 180 capsule, Rfl: 3  •  simvastatin (ZOCOR) 20 MG tablet, Take 1 tablet by mouth every night at bedtime., Disp: 90 tablet, Rfl: 1  •  sodium chloride (KARRIE 128) 5 % ophthalmic solution, Apply 1 drop to eye(s) as directed by provider Every Night., Disp: , Rfl:   •  TRAVATAN Z 0.004 % solution ophthalmic solution, Apply 1 drop to eye(s) as directed by provider Every Night., Disp: , Rfl:     Physical Exam:    Physical Exam   Constitutional: She is oriented to person, place, and time. She appears well-developed and well-nourished.   HENT:   Head: Normocephalic.   Nose: Mucosal edema present. No septal deviation.   Eyes: Pupils are equal, round, and reactive to light. Conjunctivae and EOM are normal.   Neck: Normal range of motion. Neck supple. No thyromegaly present.   Cardiovascular: Normal rate, regular rhythm, normal heart sounds and intact distal pulses.   Pulmonary/Chest: Effort normal and breath sounds normal.   Musculoskeletal: Normal range of motion. She exhibits no edema.   Lymphadenopathy:     She has no cervical adenopathy.   Neurological: She is alert and oriented to person, place, and time.   Skin:  Ecchymosis and laceration noted.   Laceration of right brow  Stitches removed today.  Steri-Strips applied to a 3 mm area without good approximation.  The rest of the laceration is well-healed.  Area around the laceration is mildly red with the appearance of allergic redness due to  Neosporin.    Bilateral periorbital ecchymoses.  Left nasal bridge ecchymosis.   Psychiatric: She has a normal mood and affect. Thought content normal.   Nursing note and vitals reviewed.       ACE III MINI        Results Review:    None I did review the ER records with the patient today.    CMP:  Lab Results   Component Value Date    GLU 95 11/06/2019    BUN 19 05/14/2020    CREATININE 0.93 05/14/2020    EGFRIFNONA 58 (L) 05/14/2020    EGFRIFAFRI 81 11/06/2019    BCR 20.4 05/14/2020     05/14/2020    K 4.1 05/14/2020    CO2 29.0 05/14/2020    CALCIUM 9.8 05/14/2020    PROTENTOTREF 7.2 11/06/2019    ALBUMIN 4.90 05/14/2020    LABGLOBREF 2.0 11/06/2019    LABIL2 2.6 11/06/2019    BILITOT 0.4 05/14/2020    ALKPHOS 68 05/14/2020    AST 31 05/14/2020    ALT 27 05/14/2020     HbA1c:  No results found for: HGBA1C  Microalbumin:  Lab Results   Component Value Date    MICROALBUR 34.4 11/06/2019     Lipid Panel  Lab Results   Component Value Date    CHOL 148 02/04/2019    TRIG 127 11/06/2019    HDL 62 (H) 11/06/2019    LDL 87 11/06/2019    AST 31 05/14/2020    ALT 27 05/14/2020       Medication Review: Medications reviewed and noted  Patient Instructions   Problem List Items Addressed This Visit        Cardiovascular and Mediastinum    Benign essential hypertension    Overview     5/22/2020 Marie Soto MD    Continue metoprolol and hydrochlorothiazide.  Continue to avoid salt in the diet.  Avoid sodas.         Relevant Medications    metoprolol succinate XL (TOPROL-XL) 50 MG 24 hr tablet    hydroCHLOROthiazide (HYDRODIURIL) 12.5 MG tablet    Vasovagal syncope - Primary    Overview     5/22/2020 Marie Soto MD    Patient was  advised to drink more fluids and to take water out to the yard with her.  She was advised not to work bending over for an extended period of time.            Musculoskeletal and Integument    Closed fracture of nasal bone with routine healing    Overview     5/22/2020 Marie Soto MD    Pain from nasal fracture has improved.  She no longer needs to take Tylenol.  There is mild residual swelling in the nasal cavity.            Other    Risk for falls    Overview     5/22/2020 Marie Soto MD    Continue regular exercise.  Increase water intake.         Traumatic black eye, initial encounter    Overview     5/22/2020 Marie Soto MD    Bilateral delmy-orbital ecchymoses due to fall due to syncope.         Laceration of brow without complication    Overview     5/22/2020 Marie Soto MD    Laceration of right brow due to fall due to syncope.      Stitches removed today.  Steri-Strips applied to a 3 mm area without good approximation.  The rest of the laceration is well-healed.  She was advised to stop using Neosporin and just use a little Vaseline on it daily.                  Diagnosis Plan   1. Vasovagal syncope     2. Laceration of brow without complication, initial encounter     3. Traumatic black eye, initial encounter     4. Closed fracture of nasal bone with routine healing     5. Benign essential hypertension     6. Risk for falls         Plan of care reviewed with patient at the conclusion of today's visit. Education was provided regarding diagnosis, management, and any prescribed or recommended OTC medications.Patient verbalizes understanding of and agreement with management plan.         Marie Soto MD        Answers for HPI/ROS submitted by the patient on 5/16/2020   What is the primary reason for your visit?: Other  Please describe your symptoms.: I have an appt because I need some stitches removed.  Have you had these symptoms before?: No  How long have you been having these  symptoms?: 1-4 days  Please list any medications you are currently taking for this condition.: an antibiotic Augumentin

## 2020-05-22 NOTE — PATIENT INSTRUCTIONS
Patient Instructions   Problem List Items Addressed This Visit        Cardiovascular and Mediastinum    Benign essential hypertension    Overview     5/22/2020 Marie Soto MD    Continue metoprolol and hydrochlorothiazide.  Continue to avoid salt in the diet.  Avoid sodas.         Relevant Medications    metoprolol succinate XL (TOPROL-XL) 50 MG 24 hr tablet    hydroCHLOROthiazide (HYDRODIURIL) 12.5 MG tablet    Vasovagal syncope - Primary    Overview     5/22/2020 Marie Soto MD    Patient was advised to drink more fluids and to take water out to the yard with her.  She was advised not to work bending over for an extended period of time.            Musculoskeletal and Integument    Closed fracture of nasal bone with routine healing    Overview     5/22/2020 Marie Soto MD    Pain from nasal fracture has improved.  She no longer needs to take Tylenol.  There is mild residual swelling in the nasal cavity.            Other    Risk for falls    Overview     5/22/2020 Marie Soto MD    Continue regular exercise.  Increase water intake.         Traumatic black eye, initial encounter    Overview     5/22/2020 Marie Soto MD    Bilateral delmy-orbital ecchymoses due to fall due to syncope.         Laceration of brow without complication    Overview     5/22/2020 Marie Soto MD    Laceration of right brow due to fall due to syncope.      Stitches removed today.  Steri-Strips applied to a 3 mm area without good approximation.  The rest of the laceration is well-healed.  She was advised to stop using Neosporin and just use a little Vaseline on it daily.

## 2020-06-16 ENCOUNTER — TELEPHONE (OUTPATIENT)
Dept: INTERNAL MEDICINE | Facility: CLINIC | Age: 80
End: 2020-06-16

## 2020-06-16 RX ORDER — LOSARTAN POTASSIUM 25 MG/1
25 TABLET ORAL DAILY
Qty: 30 TABLET | Refills: 5 | Status: SHIPPED | OUTPATIENT
Start: 2020-06-16 | End: 2020-07-28 | Stop reason: SDUPTHER

## 2020-06-16 NOTE — TELEPHONE ENCOUNTER
We need to know why she wants to change the dosing.  We need to know what her blood pressures are and whether she is having rapid heart rate or not

## 2020-06-16 NOTE — TELEPHONE ENCOUNTER
Pt states last night it was 170/89 HR 70 and this morning was 168/91 HR65. Pt states her BP is never that high and that's why she thought she needed to increase dose.

## 2020-06-16 NOTE — TELEPHONE ENCOUNTER
Tell her to continue same dose of metoprolol twice a day.  Add losartan 25 mg once a day.  Continue her current dose of her diuretic.  Avoid salt in the diet.  Continue regular exercise.    Appointment here in about 2 weeks.

## 2020-06-16 NOTE — TELEPHONE ENCOUNTER
PATIENT CALLED ASKING IF SHE COULD CHANGE DOSAGE OF     metoprolol succinate XL (TOPROL-XL) 50 MG 24 hr tablet    TO 3 TIMES A DAY INSTEAD OF 2 TIMES A DAY    PATIENT CALL BACK    [phone]

## 2020-06-30 ENCOUNTER — OFFICE VISIT (OUTPATIENT)
Dept: INTERNAL MEDICINE | Facility: CLINIC | Age: 80
End: 2020-06-30

## 2020-06-30 VITALS
DIASTOLIC BLOOD PRESSURE: 82 MMHG | WEIGHT: 146 LBS | BODY MASS INDEX: 26.87 KG/M2 | HEART RATE: 70 BPM | SYSTOLIC BLOOD PRESSURE: 130 MMHG | TEMPERATURE: 97.3 F | HEIGHT: 62 IN

## 2020-06-30 DIAGNOSIS — R55 VASOVAGAL SYNCOPE: ICD-10-CM

## 2020-06-30 DIAGNOSIS — I70.0 ATHEROSCLEROSIS OF ABDOMINAL AORTA (HCC): ICD-10-CM

## 2020-06-30 DIAGNOSIS — I10 BENIGN ESSENTIAL HYPERTENSION: ICD-10-CM

## 2020-06-30 DIAGNOSIS — G44.209 ACUTE NON INTRACTABLE TENSION-TYPE HEADACHE: ICD-10-CM

## 2020-06-30 DIAGNOSIS — E78.2 MIXED HYPERLIPIDEMIA: Primary | ICD-10-CM

## 2020-06-30 PROCEDURE — 99214 OFFICE O/P EST MOD 30 MIN: CPT | Performed by: INTERNAL MEDICINE

## 2020-07-01 NOTE — PATIENT INSTRUCTIONS
Patient Instructions   Problem List Items Addressed This Visit        Cardiovascular and Mediastinum    Benign essential hypertension    Overview     7/1/2020 Marie Soto MD    Continue losartan daily and metoprolol twice a day and hydrochlorothiazide daily.  Continue to avoid salt in the diet.  Avoid sodas.    Continue to monitor blood pressures at home.  Let us know if it starts running greater than 140 systolic most of the time.         Relevant Medications    metoprolol succinate XL (TOPROL-XL) 50 MG 24 hr tablet    hydroCHLOROthiazide (HYDRODIURIL) 12.5 MG tablet    losartan (COZAAR) 25 MG tablet    Mixed hyperlipidemia - Primary    Overview     7/1/2020 Marie Soto MD    Continue simvastatin every evening.  Continue low-fat diet and regular exercise.         Relevant Medications    omega-3 acid ethyl esters (LOVAZA) 1 g capsule    simvastatin (ZOCOR) 20 MG tablet    Atherosclerosis of abdominal aorta (CMS/HCC)    Overview     7/1/2020 Marie Soto MD    Continue daily coated 81mg aspirin and continue good blood pressure control.         Relevant Medications    aspirin 81 MG tablet    Vasovagal syncope    Overview     5/22/2020 Marie Soto MD    No further episodes since May.  She will continue to drink more fluids and to take water out to the yard with her.  She was advised not to work bending over for an extended period of time.           Other Visit Diagnoses     Acute non intractable tension-type headache        Acute headache seemed related to the high blood pressure.  She will let us know if it recurs.

## 2020-07-28 ENCOUNTER — OFFICE VISIT (OUTPATIENT)
Dept: INTERNAL MEDICINE | Facility: CLINIC | Age: 80
End: 2020-07-28

## 2020-07-28 VITALS
WEIGHT: 144 LBS | BODY MASS INDEX: 26.5 KG/M2 | SYSTOLIC BLOOD PRESSURE: 136 MMHG | TEMPERATURE: 97.1 F | HEIGHT: 62 IN | HEART RATE: 76 BPM | DIASTOLIC BLOOD PRESSURE: 84 MMHG

## 2020-07-28 DIAGNOSIS — E78.2 MIXED HYPERLIPIDEMIA: ICD-10-CM

## 2020-07-28 DIAGNOSIS — E55.9 VITAMIN D DEFICIENCY: ICD-10-CM

## 2020-07-28 DIAGNOSIS — I10 BENIGN ESSENTIAL HYPERTENSION: ICD-10-CM

## 2020-07-28 DIAGNOSIS — Z00.00 MEDICARE ANNUAL WELLNESS VISIT, SUBSEQUENT: Primary | ICD-10-CM

## 2020-07-28 DIAGNOSIS — Z78.0 POSTMENOPAUSAL: ICD-10-CM

## 2020-07-28 PROCEDURE — 96160 PT-FOCUSED HLTH RISK ASSMT: CPT | Performed by: NURSE PRACTITIONER

## 2020-07-28 PROCEDURE — G0439 PPPS, SUBSEQ VISIT: HCPCS | Performed by: NURSE PRACTITIONER

## 2020-07-28 RX ORDER — LOSARTAN POTASSIUM 25 MG/1
25 TABLET ORAL DAILY
Qty: 90 TABLET | Refills: 1 | Status: SHIPPED | OUTPATIENT
Start: 2020-07-28 | End: 2020-10-23 | Stop reason: SDUPTHER

## 2020-07-28 NOTE — PROGRESS NOTES
The ABCs of the Annual Wellness Visit  Subsequent Medicare Wellness Visit    Chief Complaint   Patient presents with   • Annual Exam     Patient is not fasting        Subjective   History of Present Illness:  Amanda Lawson is a 80 y.o. female who presents for a Subsequent Medicare Wellness Visit.    HEALTH RISK ASSESSMENT    Recent Hospitalizations:  No hospitalization(s) within the last year.    Current Medical Providers:  Patient Care Team:  Marie Soto MD as PCP - General  Marie Soto MD as PCP - Family Medicine  Holly Pierce MD as Consulting Physician (Ophthalmology)    Smoking Status:  Social History     Tobacco Use   Smoking Status Never Smoker   Smokeless Tobacco Never Used       Alcohol Consumption:  Social History     Substance and Sexual Activity   Alcohol Use Not Currently   • Frequency: Monthly or less   • Drinks per session: 1 or 2   • Binge frequency: Never       Depression Screen:   PHQ-2/PHQ-9 Depression Screening 7/28/2020   Little interest or pleasure in doing things 0   Feeling down, depressed, or hopeless 0   Total Score 0       Fall Risk Screen:  IESHAADI Fall Risk Assessment was completed, and patient is at LOW risk for falls.Assessment completed on:7/28/2020    Health Habits and Functional and Cognitive Screening:  Functional & Cognitive Status 7/28/2020   Do you have difficulty preparing food and eating? No   Do you have difficulty bathing yourself, getting dressed or grooming yourself? No   Do you have difficulty using the toilet? No   Do you have difficulty moving around from place to place? No   Do you have trouble with steps or getting out of a bed or a chair? No   Current Diet Well Balanced Diet   Dental Exam Up to date   Eye Exam Up to date   Exercise (times per week) 4 times per week   Current Exercise Activities Include Walking   Do you need help using the phone?  Yes   Are you deaf or do you have serious difficulty hearing?  Yes   Do you need help with  transportation? No   Do you need help shopping? No   Do you need help preparing meals?  No   Do you need help with housework?  No   Do you need help with laundry? No   Do you need help taking your medications? No   Do you need help managing money? No   Do you ever drive or ride in a car without wearing a seat belt? No   Have you felt unusual stress, anger or loneliness in the last month? Yes   Who do you live with? Alone   If you need help, do you have trouble finding someone available to you? No   Have you been bothered in the last four weeks by sexual problems? No   Do you have difficulty concentrating, remembering or making decisions? Yes         Does the patient have evidence of cognitive impairment? No    Asprin use counseling:Does not need ASA (and currently is not on it)    Age-appropriate Screening Schedule:  Refer to the list below for future screening recommendations based on patient's age, sex and/or medical conditions. Orders for these recommended tests are listed in the plan section. The patient has been provided with a written plan.    Health Maintenance   Topic Date Due   • ZOSTER VACCINE (2 of 3) 11/26/2010   • DXA SCAN  05/26/2019   • INFLUENZA VACCINE  08/01/2020   • LIPID PANEL  11/06/2020   • TDAP/TD VACCINES (3 - Td) 05/14/2030          The following portions of the patient's history were reviewed and updated as appropriate: allergies, current medications, past family history, past medical history, past social history, past surgical history and problem list.    Outpatient Medications Prior to Visit   Medication Sig Dispense Refill   • aspirin 81 MG tablet Take 1 tablet by mouth Daily. 90 tablet 1   • CALCIUM-VITAMIN D PO Take 1 tablet by mouth Daily.     • Cholecalciferol (VITAMIN D PO) Take 1 tablet by mouth Daily.     • Coenzyme Q10 (CO Q 10) 100 MG capsule Take 1 capsule by mouth Daily.     • famotidine (Pepcid) 20 MG tablet Take 1 tablet by mouth 2 (Two) Times a Day. 180 tablet 1   •  hydroCHLOROthiazide (HYDRODIURIL) 12.5 MG tablet Take 1 tablet by mouth Daily. 90 tablet 1   • metoprolol succinate XL (TOPROL-XL) 50 MG 24 hr tablet Take 1 tablet by mouth 2 (Two) Times a Day. 180 tablet 1   • Multiple Vitamins-Minerals (SENIOR MULTIVITAMIN PLUS PO) Take 1 tablet by mouth Daily.     • omega-3 acid ethyl esters (LOVAZA) 1 g capsule Take 2 capsules by mouth Every Night. 180 capsule 3   • simvastatin (ZOCOR) 20 MG tablet Take 1 tablet by mouth every night at bedtime. 90 tablet 1   • sodium chloride (KARRIE 128) 5 % ophthalmic solution Apply 1 drop to eye(s) as directed by provider Every Night.     • TRAVATAN Z 0.004 % solution ophthalmic solution Apply 1 drop to eye(s) as directed by provider Every Night.     • losartan (COZAAR) 25 MG tablet Take 1 tablet by mouth Daily. 30 tablet 5     No facility-administered medications prior to visit.        Patient Active Problem List   Diagnosis   • Vitamin D deficiency   • Personal history of bladder cancer   • Benign essential hypertension   • Allergic rhinitis   • Open-angle glaucoma   • Osteopenia of multiple sites   • Pain of right hip joint   • Risk for falls   • Mixed hyperlipidemia   • Bunion   • Atherosclerosis of abdominal aorta (CMS/HCC)   • Overweight (BMI 25.0-29.9)   • GERD without esophagitis   • Prolapsed uterus   • Stress incontinence of urine   • Vasovagal syncope   • Closed fracture of nasal bone with routine healing   • Traumatic black eye, initial encounter   • Laceration of brow without complication       Advanced Care Planning:  ACP discussion was held with the patient during this visit. Patient has an advance directive (not in EMR), copy requested.    Review of Systems   Constitutional: Negative for chills, fatigue and fever.   HENT: Negative for congestion, ear pain and sinus pressure.    Respiratory: Negative for cough, chest tightness, shortness of breath and wheezing.    Cardiovascular: Negative for chest pain and palpitations.  "  Gastrointestinal: Negative for abdominal pain, blood in stool and constipation.   Skin: Negative for color change.   Allergic/Immunologic: Negative for environmental allergies.   Neurological: Negative for dizziness, speech difficulty and headaches.   Psychiatric/Behavioral: Negative for confusion. The patient is not nervous/anxious.        Compared to one year ago, the patient feels her physical health is the same.  Compared to one year ago, the patient feels her mental health is the same.    Reviewed chart for potential of high risk medication in the elderly: yes  Reviewed chart for potential of harmful drug interactions in the elderly:yes    Objective         Vitals:    07/28/20 1020   BP: 136/84   BP Location: Left arm   Patient Position: Sitting   Cuff Size: Adult   Pulse: 76   Temp: 97.1 °F (36.2 °C)   TempSrc: Temporal   Weight: 65.3 kg (144 lb)   Height: 157.5 cm (62.01\")   PainSc: 0-No pain       Body mass index is 26.33 kg/m².  Discussed the patient's BMI with her. The BMI is in the acceptable range.    Physical Exam   Constitutional: She is oriented to person, place, and time. She appears well-developed and well-nourished.   Eyes: Pupils are equal, round, and reactive to light.   Cardiovascular: Normal rate.   Pulmonary/Chest: Effort normal.   Neurological: She is alert and oriented to person, place, and time.   Psychiatric: She has a normal mood and affect. Her behavior is normal. Judgment and thought content normal.             Assessment/Plan   Medicare Risks and Personalized Health Plan  CMS Preventative Services Quick Reference  Immunizations Discussed/Encouraged (specific immunizations; Shingrix )  Osteoprorosis Risk    The above risks/problems have been discussed with the patient.  Pertinent information has been shared with the patient in the After Visit Summary.  Follow up plans and orders are seen below in the Assessment/Plan Section.    Diagnoses and all orders for this visit:    1. Medicare " annual wellness visit, subsequent (Primary)    2. Postmenopausal  -     DEXA Bone Density Axial; Future    3. Benign essential hypertension  -     CBC & Differential; Future  -     Comprehensive Metabolic Panel; Future  -     Microalbumin / Creatinine Urine Ratio - Urine, Clean Catch; Future    4. Mixed hyperlipidemia  -     Lipid Panel; Future  -     TSH Rfx On Abnormal To Free T4; Future    5. Vitamin D deficiency  -     Vitamin D 25 Hydroxy; Future    Other orders  -     losartan (COZAAR) 25 MG tablet; Take 1 tablet by mouth Daily.  Dispense: 90 tablet; Refill: 1      Follow Up:  No follow-ups on file.     An After Visit Summary and PPPS were given to the patient.

## 2020-07-28 NOTE — PATIENT INSTRUCTIONS
Medicare Wellness  Personal Prevention Plan of Service     Date of Office Visit:  2020  Encounter Provider:  RUY Spencer  Place of Service:  [facility]  Patient Name: Amanda Lawson  :  1940    As part of the Medicare Wellness portion of your visit today, we are providing you with this personalized preventive plan of services (PPPS). This plan is based upon recommendations of the [place] Preventive Services Task Force (USPSTF) and the Advisory Committee on Immunization Practices (ACIP).    This lists the preventive care services that should be considered, and provides dates of when you are due. Items listed as completed are up-to-date and do not require any further intervention.    Health Maintenance   Topic Date Due   • ZOSTER VACCINE (2 of 3) 2010   • HEPATITIS C SCREENING  2019   • MEDICARE ANNUAL WELLNESS  2019   • DXA SCAN  2019   • INFLUENZA VACCINE  2020   • LIPID PANEL  2020   • TDAP/TD VACCINES (3 - Td) 2030   • Pneumococcal Vaccine Once at 65 Years Old  Completed       Orders Placed This Encounter   Procedures   • DEXA Bone Density Axial     Standing Status:   Future     Standing Expiration Date:   2021     Scheduling Instructions:      Can we do same day as mammogram?     Order Specific Question:   Reason for Exam:     Answer:   postmenopausal       No follow-ups on file.      Understanding Your Risk for Falls  Each year, millions of people suffer serious injuries from falls. It is important to understand your risk for falling. Talk with your health care provider about your risk and what you can do to lower it. There are actions you can take at home to lower your risk.  If you do have a serious fall, it is important to tell your health care provider. Falling once raises your risk for falling again.  How can falls affect me?  Serious injuries from falls are common. These include:  · Broken  bones. Most hip fractures are caused by falls.  · Traumatic brain injury (TBI). Falls are the most common cause of TBI.  Fear of falling can also cause you to avoid activities and stay at home. This can make your muscles weaker and actually raise your risk for a fall.  What can increase my risk?  Serious injuries from a fall most often happen to people older than age 65. Children and young adults ages 15-29 are also at higher risk. The more risk factors you have for falling, the higher your risk. Risk factors include:  · Weakness in the lower body.  · Lack (deficiency) of vitamin D.  · Weak bones (osteoporosis).  · Being generally weak or confused due to long-term (chronic) illness.  · Dizziness or balance problems.  · Poor vision.  · Having depression.  · Medicine that causes dizziness or drowsiness. These can include medicines for your blood pressure, heart, anxiety, insomnia, or edema, as well as pain medicines and muscle relaxants.  · Drinking alcohol.  · Foot pain or improper footwear.  · Working at a dangerous job.  · Having had a fall in the past.  · Tripping hazards at home, such as floor clutter or loose rugs, or poor lighting.  · Having pets or clutter in your home.  What actions can I take to lower my risk of falling?         · Maintain physical fitness:  ? Do strength and balance exercises. Consider taking a regular class to build strength and balance. Yoga and benito chi are good options.  ? Have your eyes checked every year and your vision prescription updated as needed.  · Remove all clutter from walkways and stairways, including extension cords.  · Use a cordless phone.  · Do not use throw rugs. Make sure all carpeting is taped or tacked down securely.  · Use good lighting in all rooms. Keep a flashlight near your bed.  · Make sure there is a clear path from your bed to the bathroom. Use night-lights.  · Install grab bars for your tub, shower, and toilet. Use a bath mat in your tub or shower.  · Attach  secure railings on both sides of your stairs.  · Repair uneven or broken steps.  · Use a cane or walker as directed by your health care provider.  · Wear nonskid shoes. Do not wear high heels. Do not walk around the house in socks or slippers.  · Avoid walking on icy or slippery surfaces. Walk on the grass instead of on icy or slick sidewalks. Where you can, use ice melt to get rid of ice on walkways.  Questions to ask your health care provider  · Can you help me evaluate my risk for a fall?  · Do any of my medicines make me more likely to fall?  · Should I take a vitamin D supplement?  · What exercises can I do to improve my strength and balance?  · Should I make an appointment to have my vision checked?  · Do I need a bone density test to check for osteoporosis?  · Would it help to use a cane or a walker?  Where to find more information  · Centers for Disease Control and Prevention, STEADI: cdc.gov  · Community-Based Fall Prevention Programs: cdc.gov  · National [place] on Aging: bb3drtq.rajeev.nih.gov  Contact a health care provider if:  · You fall at home.  · You are afraid of falling at home.  · You feel weak, drowsy, or dizzy at home.  Summary  · People 65 and older are at high risk for falling. However, older people are not the only ones injured in falls. Children and young adults have a higher-than-normal risk, too.  · Talk with your health care provider about your risks for falling and how to lower those risks.  · Taking certain precautions at home can lower your risk for falling.  · If you fall, always tell your health care provider.  This information is not intended to replace advice given to you by your health care provider. Make sure you discuss any questions you have with your health care provider.  Document Released: 08/01/2018 Document Revised: 03/19/2019 Document Reviewed: 08/01/2018  Elsevier Patient Education © 2020 [employer]

## 2020-07-29 ENCOUNTER — LAB REQUISITION (OUTPATIENT)
Dept: LAB | Facility: HOSPITAL | Age: 80
End: 2020-07-29

## 2020-07-29 DIAGNOSIS — Z00.00 ROUTINE GENERAL MEDICAL EXAMINATION AT A HEALTH CARE FACILITY: ICD-10-CM

## 2020-07-29 PROCEDURE — 36415 COLL VENOUS BLD VENIPUNCTURE: CPT

## 2020-07-30 LAB
25(OH)D3+25(OH)D2 SERPL-MCNC: 57.8 NG/ML (ref 30–100)
ALBUMIN SERPL-MCNC: 5.2 G/DL (ref 3.5–5.2)
ALBUMIN/CREAT UR: 41 MG/G CREAT (ref 0–29)
ALBUMIN/GLOB SERPL: 2.9 G/DL
ALP SERPL-CCNC: 68 U/L (ref 39–117)
ALT SERPL-CCNC: 23 U/L (ref 1–33)
AST SERPL-CCNC: 22 U/L (ref 1–32)
BASOPHILS # BLD AUTO: 0.03 10*3/MM3 (ref 0–0.2)
BASOPHILS NFR BLD AUTO: 0.5 % (ref 0–1.5)
BILIRUB SERPL-MCNC: 0.4 MG/DL (ref 0–1.2)
BUN SERPL-MCNC: 18 MG/DL (ref 8–23)
BUN/CREAT SERPL: 20.5 (ref 7–25)
CALCIUM SERPL-MCNC: 9.8 MG/DL (ref 8.6–10.5)
CHLORIDE SERPL-SCNC: 96 MMOL/L (ref 98–107)
CHOLEST SERPL-MCNC: 168 MG/DL (ref 0–200)
CO2 SERPL-SCNC: 30 MMOL/L (ref 22–29)
CREAT SERPL-MCNC: 0.88 MG/DL (ref 0.57–1)
CREAT UR-MCNC: 91.7 MG/DL
EOSINOPHIL # BLD AUTO: 0.06 10*3/MM3 (ref 0–0.4)
EOSINOPHIL NFR BLD AUTO: 1 % (ref 0.3–6.2)
ERYTHROCYTE [DISTWIDTH] IN BLOOD BY AUTOMATED COUNT: 12.6 % (ref 12.3–15.4)
GLOBULIN SER CALC-MCNC: 1.8 GM/DL
GLUCOSE SERPL-MCNC: 96 MG/DL (ref 65–99)
HCT VFR BLD AUTO: 45.2 % (ref 34–46.6)
HDLC SERPL-MCNC: 57 MG/DL (ref 40–60)
HGB BLD-MCNC: 15.2 G/DL (ref 12–15.9)
IMM GRANULOCYTES # BLD AUTO: 0.01 10*3/MM3 (ref 0–0.05)
IMM GRANULOCYTES NFR BLD AUTO: 0.2 % (ref 0–0.5)
LDLC SERPL CALC-MCNC: 83 MG/DL (ref 0–100)
LYMPHOCYTES # BLD AUTO: 1.98 10*3/MM3 (ref 0.7–3.1)
LYMPHOCYTES NFR BLD AUTO: 33.2 % (ref 19.6–45.3)
MCH RBC QN AUTO: 30.6 PG (ref 26.6–33)
MCHC RBC AUTO-ENTMCNC: 33.6 G/DL (ref 31.5–35.7)
MCV RBC AUTO: 90.9 FL (ref 79–97)
MICROALBUMIN UR-MCNC: 37.9 UG/ML
MONOCYTES # BLD AUTO: 0.58 10*3/MM3 (ref 0.1–0.9)
MONOCYTES NFR BLD AUTO: 9.7 % (ref 5–12)
NEUTROPHILS # BLD AUTO: 3.3 10*3/MM3 (ref 1.7–7)
NEUTROPHILS NFR BLD AUTO: 55.4 % (ref 42.7–76)
NRBC BLD AUTO-RTO: 0 /100 WBC (ref 0–0.2)
PLATELET # BLD AUTO: 185 10*3/MM3 (ref 140–450)
POTASSIUM SERPL-SCNC: 4.2 MMOL/L (ref 3.5–5.2)
PROT SERPL-MCNC: 7 G/DL (ref 6–8.5)
RBC # BLD AUTO: 4.97 10*6/MM3 (ref 3.77–5.28)
SODIUM SERPL-SCNC: 137 MMOL/L (ref 136–145)
TRIGL SERPL-MCNC: 139 MG/DL (ref 0–150)
TSH SERPL DL<=0.005 MIU/L-ACNC: 3.22 UIU/ML (ref 0.27–4.2)
VLDLC SERPL CALC-MCNC: 27.8 MG/DL
WBC # BLD AUTO: 5.96 10*3/MM3 (ref 3.4–10.8)

## 2020-08-04 ENCOUNTER — OFFICE VISIT (OUTPATIENT)
Dept: INTERNAL MEDICINE | Facility: CLINIC | Age: 80
End: 2020-08-04

## 2020-08-04 VITALS
HEART RATE: 80 BPM | BODY MASS INDEX: 26.72 KG/M2 | DIASTOLIC BLOOD PRESSURE: 70 MMHG | HEIGHT: 62 IN | SYSTOLIC BLOOD PRESSURE: 128 MMHG | TEMPERATURE: 97.3 F | WEIGHT: 145.2 LBS

## 2020-08-04 DIAGNOSIS — Z85.51 PERSONAL HISTORY OF BLADDER CANCER: ICD-10-CM

## 2020-08-04 DIAGNOSIS — E78.2 MIXED HYPERLIPIDEMIA: ICD-10-CM

## 2020-08-04 DIAGNOSIS — E55.9 VITAMIN D DEFICIENCY: ICD-10-CM

## 2020-08-04 DIAGNOSIS — I10 BENIGN ESSENTIAL HYPERTENSION: ICD-10-CM

## 2020-08-04 DIAGNOSIS — Z91.81 RISK FOR FALLS: ICD-10-CM

## 2020-08-04 DIAGNOSIS — K21.9 GERD WITHOUT ESOPHAGITIS: ICD-10-CM

## 2020-08-04 DIAGNOSIS — N39.3 STRESS INCONTINENCE OF URINE: ICD-10-CM

## 2020-08-04 DIAGNOSIS — Z00.00 ANNUAL PHYSICAL EXAM: Primary | ICD-10-CM

## 2020-08-04 DIAGNOSIS — M85.89 OSTEOPENIA OF MULTIPLE SITES: ICD-10-CM

## 2020-08-04 PROBLEM — R55 VASOVAGAL SYNCOPE: Status: RESOLVED | Noted: 2020-05-22 | Resolved: 2020-08-04

## 2020-08-04 PROBLEM — S01.81XA LACERATION OF BROW WITHOUT COMPLICATION: Status: RESOLVED | Noted: 2020-05-22 | Resolved: 2020-08-04

## 2020-08-04 PROBLEM — S00.10XA: Status: RESOLVED | Noted: 2020-05-22 | Resolved: 2020-08-04

## 2020-08-04 PROBLEM — S02.2XXD CLOSED FRACTURE OF NASAL BONE WITH ROUTINE HEALING: Status: RESOLVED | Noted: 2020-05-22 | Resolved: 2020-08-04

## 2020-08-04 PROCEDURE — 96160 PT-FOCUSED HLTH RISK ASSMT: CPT | Performed by: INTERNAL MEDICINE

## 2020-08-04 PROCEDURE — 99397 PER PM REEVAL EST PAT 65+ YR: CPT | Performed by: INTERNAL MEDICINE

## 2020-08-04 RX ORDER — OMEGA-3-ACID ETHYL ESTERS 1 G/1
2 CAPSULE, LIQUID FILLED ORAL NIGHTLY
Qty: 360 CAPSULE | Refills: 3 | Status: SHIPPED | OUTPATIENT
Start: 2020-08-04 | End: 2021-02-04 | Stop reason: SDUPTHER

## 2020-08-04 NOTE — PATIENT INSTRUCTIONS
Patient Instructions   Problem List Items Addressed This Visit        Cardiovascular and Mediastinum    Benign essential hypertension    Overview     8/4/2020 Marie Soto MD    Continue losartan daily and metoprolol twice a day and hydrochlorothiazide daily.  Continue to avoid salt in the diet.  Avoid sodas.    Continue to monitor blood pressures at home.          Relevant Medications    metoprolol succinate XL (TOPROL-XL) 50 MG 24 hr tablet    hydroCHLOROthiazide (HYDRODIURIL) 12.5 MG tablet    losartan (COZAAR) 25 MG tablet    Mixed hyperlipidemia    Overview     8/4/2020 Marie Soto MD    Continue simvastatin every evening.  Continue low-fat diet and regular exercise.         Relevant Medications    simvastatin (ZOCOR) 20 MG tablet    omega-3 acid ethyl esters (LOVAZA) 1 g capsule       Digestive    Vitamin D deficiency    Overview     8/4/2020 Marie Soto MD    Continue current dose of vitamin D3 and calcium.         GERD without esophagitis    Overview     8/4/2020 Marie Soto MD    Continue famotidine twice a day.  Continue to avoid eating close to bedtime and avoid large meals.         Relevant Medications    famotidine (Pepcid) 20 MG tablet       Musculoskeletal and Integument    Osteopenia of multiple sites    Overview     8/4/2020 Marie Soto MD     DEXA is scheduled for next week.  Continue weight bearing exercises and taking calcium and vitamin D.             Genitourinary    Stress incontinence of urine    Overview     8/4/2020 Marie Soto MD    Do some Kegle exercises every day.  Continue to take frequent bathroom breaks.            Other    Personal history of bladder cancer    Overview     8/4/2020 Marie Soto MD    Continue follow up with Dr. Giles and cystoscopy every December.          Risk for falls    Overview     8/4/2020 Marie Soto MD    Continue regular exercise.  Continue good water intake.         Annual physical exam - Primary    Overview      8/4/2020 Marie Soto MD    She will get the new shingles vaccine at her pharmacy.  She will get a flu shot in September.                  Fall Prevention in the Home, Adult  Falls can cause injuries and can affect people from all age groups. There are many simple things that you can do to make your home safe and to help prevent falls. Ask for help when making these changes, if needed.  What actions can I take to prevent falls?  General instructions  · Use good lighting in all rooms. Replace any light bulbs that burn out.  · Turn on lights if it is dark. Use night-lights.  · Place frequently used items in easy-to-reach places. Lower the shelves around your home if necessary.  · Set up furniture so that there are clear paths around it. Avoid moving your furniture around.  · Remove throw rugs and other tripping hazards from the floor.  · Avoid walking on wet floors.  · Fix any uneven floor surfaces.  · Add color or contrast paint or tape to grab bars and handrails in your home. Place contrasting color strips on the first and last steps of stairways.  · When you use a stepladder, make sure that it is completely opened and that the sides are firmly locked. Have someone hold the ladder while you are using it. Do not climb a closed stepladder.  · Be aware of any and all pets.  What can I do in the bathroom?         · Keep the floor dry. Immediately clean up any water that spills onto the floor.  · Remove soap buildup in the tub or shower on a regular basis.  · Use non-skid mats or decals on the floor of the tub or shower.  · Attach bath mats securely with double-sided, non-slip rug tape.  · If you need to sit down while you are in the shower, use a plastic, non-slip stool.  · Install grab bars by the toilet and in the tub and shower. Do not use towel bars as grab bars.  What can I do in the bedroom?  · Make sure that a bedside light is easy to reach.  · Do not use oversized bedding that drapes onto the  floor.  · Have a firm chair that has side arms to use for getting dressed.  What can I do in the kitchen?  · Clean up any spills right away.  · If you need to reach for something above you, use a sturdy step stool that has a grab bar.  · Keep electrical cables out of the way.  · Do not use floor polish or wax that makes floors slippery. If you must use wax, make sure that it is non-skid floor wax.  What can I do in the stairways?  · Do not leave any items on the stairs.  · Make sure that you have a light switch at the top of the stairs and the bottom of the stairs. Have them installed if you do not have them.  · Make sure that there are handrails on both sides of the stairs. Fix handrails that are broken or loose. Make sure that handrails are as long as the stairways.  · Install non-slip stair treads on all stairs in your home.  · Avoid having throw rugs at the top or bottom of stairways, or secure the rugs with carpet tape to prevent them from moving.  · Choose a carpet design that does not hide the edge of steps on the stairway.  · Check any carpeting to make sure that it is firmly attached to the stairs. Fix any carpet that is loose or worn.  What can I do on the outside of my home?  · Use bright outdoor lighting.  · Regularly repair the edges of walkways and driveways and fix any cracks.  · Remove high doorway thresholds.  · Trim any shrubbery on the main path into your home.  · Regularly check that handrails are securely fastened and in good repair. Both sides of any steps should have handrails.  · Install guardrails along the edges of any raised decks or porches.  · Clear walkways of debris and clutter, including tools and rocks.  · Have leaves, snow, and ice cleared regularly.  · Use sand or salt on walkways during winter months.  · In the garage, clean up any spills right away, including grease or oil spills.  What other actions can I take?  · Wear closed-toe shoes that fit well and support your feet. Wear  shoes that have rubber soles or low heels.  · Use mobility aids as needed, such as canes, walkers, scooters, and crutches.  · Review your medicines with your health care provider. Some medicines can cause dizziness or changes in blood pressure, which increase your risk of falling.  Talk with your health care provider about other ways that you can decrease your risk of falls. This may include working with a physical therapist or  to improve your strength, balance, and endurance.  Where to find more information  · Centers for Disease Control and Prevention, STEADI: https://www.cdc.gov  · National [place] on Aging: https://sd1bjmp.rajeev.nih.gov  Contact a health care provider if:  · You are afraid of falling at home.  · You feel weak, drowsy, or dizzy at home.  · You fall at home.  Summary  · There are many simple things that you can do to make your home safe and to help prevent falls.  · Ways to make your home safe include removing tripping hazards and installing grab bars in the bathroom.  · Ask for help when making these changes in your home.  This information is not intended to replace advice given to you by your health care provider. Make sure you discuss any questions you have with your health care provider.  Document Released: 12/08/2003 Document Revised: 11/30/2018 Document Reviewed: 08/02/2018  Elsevier Patient Education © 2020 [employer]

## 2020-08-04 NOTE — PROGRESS NOTES
QUICK REFERENCE INFORMATION:  The ABCs of the Annual Wellness Visit    Annual Wellness visit    HEALTH RISK ASSESSMENT    1940    Recent History  No hospitalization(s) within the last year..        Current Medical Providers:  Patient Care Team:  Marie Soto MD as PCP - General  Marie Soto MD as PCP - Family Medicine  Holly Pierce MD as Consulting Physician (Ophthalmology)  Kenneth Giles MD as Consulting Physician (Urology)        Smoking Status:  Social History     Tobacco Use   Smoking Status Never Smoker   Smokeless Tobacco Never Used       Alcohol Consumption:  Social History     Substance and Sexual Activity   Alcohol Use Not Currently   • Frequency: Monthly or less   • Drinks per session: 1 or 2   • Binge frequency: Never       Depression Screen:   PHQ-2/PHQ-9 Depression Screening 7/28/2020   Little interest or pleasure in doing things 0   Feeling down, depressed, or hopeless 0   Total Score 0       Health Habits:              Recent Lab Results:  CMP:  Lab Results   Component Value Date    GLU 96 07/29/2020    BUN 18 07/29/2020    CREATININE 0.88 07/29/2020    EGFRIFNONA 62 07/29/2020    EGFRIFAFRI 75 07/29/2020    BCR 20.5 07/29/2020     07/29/2020    K 4.2 07/29/2020    CO2 30.0 (H) 07/29/2020    CALCIUM 9.8 07/29/2020    PROTENTOTREF 7.0 07/29/2020    ALBUMIN 5.20 07/29/2020    LABGLOBREF 1.8 07/29/2020    LABIL2 2.9 07/29/2020    BILITOT 0.4 07/29/2020    ALKPHOS 68 07/29/2020    AST 22 07/29/2020    ALT 23 07/29/2020     Lipid Panel:  Lab Results   Component Value Date    CHOL 148 02/04/2019    TRIG 139 07/29/2020    HDL 57 07/29/2020    VLDL 27.8 07/29/2020     HbA1c:           Age-appropriate Screening Schedule:  Refer to the list below for future screening recommendations based on patient's age, sex and/or medical conditions. Orders for these recommended tests are listed in the plan section. The patient has been provided with a written plan.    Age appropriate  preventive counseling done including age appropriate vaccines,regular  Mammogram and self breast exam, pap smear, colonoscopy, regular dental visits, mental health, injury prevention such as wearing seat belt and preventing falls, healthy  nutrition, healthy weight, regular physical exercise. Alcohol use is moderate.  Tobacco history-none. Drug use-none.  STD's-not at risk.          Health Maintenance   Topic Date Due   • ZOSTER VACCINE (2 of 3) 11/26/2010   • DXA SCAN  05/26/2019   • INFLUENZA VACCINE  08/01/2020   • LIPID PANEL  07/29/2021   • TDAP/TD VACCINES (3 - Td) 05/14/2030        Subjective   History of Present Illness    Amanda Lawson is a 80 y.o. female who presents for an Annual Wellness Visit follow-up of chronic conditions including hypertension, hyperlipidemia, GERD, osteopenia.    She had a fall with nasal fracture and laceration on the forehead earlier this year.  Her nose has healed well it is asymptomatic now.  Laceration healed well.  She has not had any more falls.  No dizziness or lightheadedness.    CHRONIC CONDITIONS    BP's at home 114 to 137/62 to 71. Heart rate 60s.takes meds regularly. Avoids salt.    GERD controlled with famotidine and avoiding spicy foods in evening.     Hyperlipidemia -takes statin and walks and eats low fat.     For osteopenia, takes vit D. Does handweights some. Walks a lot.     Urge urine incontinence is mild to moderate.  She has been offered medication by her urologist but she would rather not take another pill. She wears a pad.  She has a cystoscopy every December by Dr. Giles to follow-up on the bladder cancer that occurred 30 years ago.    The following portions of the patient's history were reviewed and updated as appropriate: allergies, current medications, past family history, past medical history, past social history, past surgical history and problem list.    Outpatient Medications Prior to Visit   Medication Sig Dispense Refill   • aspirin 81 MG tablet  Take 1 tablet by mouth Daily. 90 tablet 1   • CALCIUM-VITAMIN D PO Take 1 tablet by mouth Daily.     • Cholecalciferol (VITAMIN D PO) Take 1 tablet by mouth Daily.     • Coenzyme Q10 (CO Q 10) 100 MG capsule Take 1 capsule by mouth Daily.     • famotidine (Pepcid) 20 MG tablet Take 1 tablet by mouth 2 (Two) Times a Day. 180 tablet 1   • hydroCHLOROthiazide (HYDRODIURIL) 12.5 MG tablet Take 1 tablet by mouth Daily. 90 tablet 1   • losartan (COZAAR) 25 MG tablet Take 1 tablet by mouth Daily. 90 tablet 1   • metoprolol succinate XL (TOPROL-XL) 50 MG 24 hr tablet Take 1 tablet by mouth 2 (Two) Times a Day. 180 tablet 1   • Multiple Vitamins-Minerals (SENIOR MULTIVITAMIN PLUS PO) Take 1 tablet by mouth Daily.     • simvastatin (ZOCOR) 20 MG tablet Take 1 tablet by mouth every night at bedtime. 90 tablet 1   • sodium chloride (KARRIE 128) 5 % ophthalmic solution Apply 1 drop to eye(s) as directed by provider Every Night.     • TRAVATAN Z 0.004 % solution ophthalmic solution Apply 1 drop to eye(s) as directed by provider Every Night.     • omega-3 acid ethyl esters (LOVAZA) 1 g capsule Take 2 capsules by mouth Every Night. 180 capsule 3     No facility-administered medications prior to visit.        Patient Active Problem List   Diagnosis   • Vitamin D deficiency   • Personal history of bladder cancer   • Benign essential hypertension   • Allergic rhinitis   • Open-angle glaucoma   • Osteopenia of multiple sites   • Pain of right hip joint   • Risk for falls   • Mixed hyperlipidemia   • Bunion   • Atherosclerosis of abdominal aorta (CMS/HCC)   • Overweight (BMI 25.0-29.9)   • GERD without esophagitis   • Prolapsed uterus   • Stress incontinence of urine   • Annual physical exam       Advance Care Planning:  ACP discussion was held with the patient during this visit. Patient has an advance directive (not in EMR), copy requested.    Identification of Risk Factors:  Risk factors include: Cardiovascular risk  Osteoprorosis  Risk.    Review of Systems   Constitutional: Negative for chills, fatigue and fever.   HENT: Negative for sinus pain and sore throat.    Eyes: Negative for visual disturbance.   Respiratory: Negative for cough, shortness of breath and wheezing.    Cardiovascular: Negative for chest pain, palpitations and leg swelling.   Gastrointestinal: Negative for abdominal pain, blood in stool, constipation and diarrhea.   Endocrine: Negative for cold intolerance and heat intolerance.   Genitourinary: Negative for dysuria and frequency.   Musculoskeletal: Negative for arthralgias, back pain and gait problem.   Skin: Negative for rash and wound.   Allergic/Immunologic: Negative for food allergies.   Neurological: Negative for dizziness and headaches.   Hematological: Negative for adenopathy. Does not bruise/bleed easily.   Psychiatric/Behavioral: Negative for dysphoric mood and suicidal ideas. The patient is not nervous/anxious.        Compared to one year ago, the patient feels her physical health is the same.  Compared to one year ago, the patient feels her mental health is the same.    Objective     Physical Exam   Constitutional: She is oriented to person, place, and time. She appears well-developed and well-nourished.   HENT:   Head: Normocephalic.   Eyes: Pupils are equal, round, and reactive to light. Conjunctivae and EOM are normal.   Neck: Normal range of motion. Neck supple. No thyromegaly present.   Cardiovascular: Normal rate, regular rhythm, normal heart sounds and intact distal pulses.   Pulmonary/Chest: Effort normal and breath sounds normal. She has no wheezes. Right breast exhibits no inverted nipple, no mass, no nipple discharge, no skin change and no tenderness. Left breast exhibits no inverted nipple, no mass, no nipple discharge, no skin change and no tenderness.   Abdominal: Soft. Bowel sounds are normal. There is no tenderness.   Musculoskeletal: Normal range of motion. She exhibits no edema or tenderness.  "  Lymphadenopathy:     She has no cervical adenopathy.     She has no axillary adenopathy.   Neurological: She is alert and oriented to person, place, and time. She has normal strength. No cranial nerve deficit or sensory deficit. Coordination and gait normal.   Skin: Skin is warm and dry. No rash noted.   Psychiatric: She has a normal mood and affect. Her speech is normal and behavior is normal. Judgment and thought content normal. Cognition and memory are normal.   Nursing note and vitals reviewed.      Vitals:    08/04/20 1239 08/04/20 1348   BP: 148/78 128/70   BP Location: Left arm Right arm   Patient Position: Sitting Sitting   Cuff Size: Adult Adult   Pulse: 80    Temp: 97.3 °F (36.3 °C)    TempSrc: Infrared    Weight: 65.9 kg (145 lb 3.2 oz)    Height: 157.5 cm (62.01\")    PainSc: 0-No pain        Patient's Body mass index is 26.55 kg/m². BMI is within normal for her age..      CMP:  Lab Results   Component Value Date    GLU 96 07/29/2020    BUN 18 07/29/2020    CREATININE 0.88 07/29/2020    EGFRIFNONA 62 07/29/2020    EGFRIFAFRI 75 07/29/2020    BCR 20.5 07/29/2020     07/29/2020    K 4.2 07/29/2020    CO2 30.0 (H) 07/29/2020    CALCIUM 9.8 07/29/2020    PROTENTOTREF 7.0 07/29/2020    ALBUMIN 5.20 07/29/2020    LABGLOBREF 1.8 07/29/2020    LABIL2 2.9 07/29/2020    BILITOT 0.4 07/29/2020    ALKPHOS 68 07/29/2020    AST 22 07/29/2020    ALT 23 07/29/2020     HbA1c:  No results found for: HGBA1C  Microalbumin:  Lab Results   Component Value Date    MICROALBUR 37.9 07/29/2020     Lipid Panel  Lab Results   Component Value Date    CHOL 148 02/04/2019    TRIG 139 07/29/2020    HDL 57 07/29/2020    LDL 83 07/29/2020    AST 22 07/29/2020    ALT 23 07/29/2020       Assessment/Plan   Patient Self-Management and Personalized Health Advice  The patient has been provided with information about: diet, exercise and prevention of cardiac or vascular disease and preventive services including:   · Annual Wellness Visit " (AWV).  Patient Instructions   Problem List Items Addressed This Visit        Cardiovascular and Mediastinum    Benign essential hypertension    Overview     8/4/2020 Marie Soto MD    Continue losartan daily and metoprolol twice a day and hydrochlorothiazide daily.  Continue to avoid salt in the diet.  Avoid sodas.    Continue to monitor blood pressures at home.          Relevant Medications    metoprolol succinate XL (TOPROL-XL) 50 MG 24 hr tablet    hydroCHLOROthiazide (HYDRODIURIL) 12.5 MG tablet    losartan (COZAAR) 25 MG tablet    Mixed hyperlipidemia    Overview     8/4/2020 Marie Soto MD    Continue simvastatin every evening.  Continue low-fat diet and regular exercise.         Relevant Medications    simvastatin (ZOCOR) 20 MG tablet    omega-3 acid ethyl esters (LOVAZA) 1 g capsule       Digestive    Vitamin D deficiency    Overview     8/4/2020 Marie Soto MD    Continue current dose of vitamin D3 and calcium.         GERD without esophagitis    Overview     8/4/2020 Marie Soto MD    Continue famotidine twice a day.  Continue to avoid eating close to bedtime and avoid large meals.         Relevant Medications    famotidine (Pepcid) 20 MG tablet       Musculoskeletal and Integument    Osteopenia of multiple sites    Overview     8/4/2020 Marie Soto MD     DEXA is scheduled for next week.  Continue weight bearing exercises and taking calcium and vitamin D.             Genitourinary    Stress incontinence of urine    Overview     8/4/2020 Marie Soto MD    Do some Kegle exercises every day.  Continue to take frequent bathroom breaks.            Other    Personal history of bladder cancer    Overview     8/4/2020 Marie Soto MD    Continue follow up with Dr. Giles and cystoscopy every December.          Risk for falls    Overview     8/4/2020 Marie Soto MD    Continue regular exercise.  Continue good water intake.         Annual physical exam - Primary     Overview     8/4/2020 Marie Soto MD    She will get the new shingles vaccine at her pharmacy.  She will get a flu shot in September.                  Diagnosis Plan   1. Annual physical exam     2. Mixed hyperlipidemia     3. Benign essential hypertension     4. GERD without esophagitis     5. Osteopenia of multiple sites     6. Stress incontinence of urine     7. Personal history of bladder cancer     8. Risk for falls     9. Vitamin D deficiency         Outpatient Encounter Medications as of 8/4/2020   Medication Sig Dispense Refill   • aspirin 81 MG tablet Take 1 tablet by mouth Daily. 90 tablet 1   • CALCIUM-VITAMIN D PO Take 1 tablet by mouth Daily.     • Cholecalciferol (VITAMIN D PO) Take 1 tablet by mouth Daily.     • Coenzyme Q10 (CO Q 10) 100 MG capsule Take 1 capsule by mouth Daily.     • famotidine (Pepcid) 20 MG tablet Take 1 tablet by mouth 2 (Two) Times a Day. 180 tablet 1   • hydroCHLOROthiazide (HYDRODIURIL) 12.5 MG tablet Take 1 tablet by mouth Daily. 90 tablet 1   • losartan (COZAAR) 25 MG tablet Take 1 tablet by mouth Daily. 90 tablet 1   • metoprolol succinate XL (TOPROL-XL) 50 MG 24 hr tablet Take 1 tablet by mouth 2 (Two) Times a Day. 180 tablet 1   • Multiple Vitamins-Minerals (SENIOR MULTIVITAMIN PLUS PO) Take 1 tablet by mouth Daily.     • omega-3 acid ethyl esters (LOVAZA) 1 g capsule Take 2 capsules by mouth Every Night. 360 capsule 3   • simvastatin (ZOCOR) 20 MG tablet Take 1 tablet by mouth every night at bedtime. 90 tablet 1   • sodium chloride (KARRIE 128) 5 % ophthalmic solution Apply 1 drop to eye(s) as directed by provider Every Night.     • TRAVATAN Z 0.004 % solution ophthalmic solution Apply 1 drop to eye(s) as directed by provider Every Night.     • [DISCONTINUED] omega-3 acid ethyl esters (LOVAZA) 1 g capsule Take 2 capsules by mouth Every Night. 180 capsule 3     No facility-administered encounter medications on file as of 8/4/2020.        Reviewed use of high risk  medication in the elderly: yes  Reviewed for potential of harmful drug interactions in the elderly: yes    Follow Up:  Return in about 6 months (around 2/4/2021) for recheck fasting.     An After Visit Summary and PPPS with all of these plans were given to the patient.           Note: Part of this note may be an electronic transcription/translation of spoken language to printed text using the Dragon Dictation System.

## 2020-08-10 ENCOUNTER — APPOINTMENT (OUTPATIENT)
Dept: MAMMOGRAPHY | Facility: HOSPITAL | Age: 80
End: 2020-08-10

## 2020-08-14 ENCOUNTER — HOSPITAL ENCOUNTER (OUTPATIENT)
Dept: MAMMOGRAPHY | Facility: HOSPITAL | Age: 80
Discharge: HOME | End: 2020-08-14
Admitting: INTERNAL MEDICINE

## 2020-08-14 DIAGNOSIS — Z12.31 VISIT FOR SCREENING MAMMOGRAM: ICD-10-CM

## 2020-08-14 PROCEDURE — 77067 SCR MAMMO BI INCL CAD: CPT

## 2020-08-14 PROCEDURE — 77063 BREAST TOMOSYNTHESIS BI: CPT

## 2020-08-14 PROCEDURE — 77063 BREAST TOMOSYNTHESIS BI: CPT | Performed by: RADIOLOGY

## 2020-08-14 PROCEDURE — 77067 SCR MAMMO BI INCL CAD: CPT | Performed by: RADIOLOGY

## 2020-10-23 RX ORDER — LOSARTAN POTASSIUM 25 MG/1
25 TABLET ORAL DAILY
Qty: 90 TABLET | Refills: 1 | Status: SHIPPED | OUTPATIENT
Start: 2020-10-23 | End: 2021-04-30

## 2020-11-10 DIAGNOSIS — E78.2 MIXED HYPERLIPIDEMIA: ICD-10-CM

## 2020-11-10 RX ORDER — HYDROCHLOROTHIAZIDE 12.5 MG/1
12.5 TABLET ORAL DAILY
Qty: 90 TABLET | Refills: 1 | Status: SHIPPED | OUTPATIENT
Start: 2020-11-10 | End: 2021-02-04 | Stop reason: SDUPTHER

## 2020-11-10 RX ORDER — METOPROLOL SUCCINATE 50 MG/1
50 TABLET, EXTENDED RELEASE ORAL 2 TIMES DAILY
Qty: 180 TABLET | Refills: 1 | Status: SHIPPED | OUTPATIENT
Start: 2020-11-10 | End: 2021-02-04 | Stop reason: SDUPTHER

## 2020-11-10 RX ORDER — SIMVASTATIN 20 MG
20 TABLET ORAL
Qty: 90 TABLET | Refills: 1 | Status: SHIPPED | OUTPATIENT
Start: 2020-11-10 | End: 2021-02-04 | Stop reason: SDUPTHER

## 2021-01-30 DIAGNOSIS — K21.9 GERD WITHOUT ESOPHAGITIS: ICD-10-CM

## 2021-02-01 RX ORDER — FAMOTIDINE 20 MG/1
TABLET, FILM COATED ORAL
Qty: 180 TABLET | Refills: 0 | Status: SHIPPED | OUTPATIENT
Start: 2021-02-01 | End: 2021-04-30

## 2021-02-03 ENCOUNTER — LAB (OUTPATIENT)
Dept: LAB | Facility: HOSPITAL | Age: 81
End: 2021-02-03

## 2021-02-03 DIAGNOSIS — E78.2 MIXED HYPERLIPIDEMIA: ICD-10-CM

## 2021-02-03 DIAGNOSIS — I10 BENIGN ESSENTIAL HYPERTENSION: ICD-10-CM

## 2021-02-04 ENCOUNTER — OFFICE VISIT (OUTPATIENT)
Dept: INTERNAL MEDICINE | Facility: CLINIC | Age: 81
End: 2021-02-04

## 2021-02-04 VITALS
WEIGHT: 145 LBS | BODY MASS INDEX: 26.68 KG/M2 | SYSTOLIC BLOOD PRESSURE: 132 MMHG | TEMPERATURE: 97.7 F | DIASTOLIC BLOOD PRESSURE: 82 MMHG | HEIGHT: 62 IN | HEART RATE: 66 BPM

## 2021-02-04 DIAGNOSIS — E66.3 OVERWEIGHT WITH BODY MASS INDEX (BMI) OF 26 TO 26.9 IN ADULT: Chronic | ICD-10-CM

## 2021-02-04 DIAGNOSIS — R53.83 OTHER FATIGUE: Chronic | ICD-10-CM

## 2021-02-04 DIAGNOSIS — M85.89 OSTEOPENIA OF MULTIPLE SITES: ICD-10-CM

## 2021-02-04 DIAGNOSIS — I70.0 ATHEROSCLEROSIS OF ABDOMINAL AORTA (HCC): ICD-10-CM

## 2021-02-04 DIAGNOSIS — E78.2 MIXED HYPERLIPIDEMIA: ICD-10-CM

## 2021-02-04 DIAGNOSIS — Z23 NEED FOR VACCINATION: ICD-10-CM

## 2021-02-04 DIAGNOSIS — I10 BENIGN ESSENTIAL HYPERTENSION: Primary | ICD-10-CM

## 2021-02-04 DIAGNOSIS — M54.50 CHRONIC BILATERAL LOW BACK PAIN WITHOUT SCIATICA: ICD-10-CM

## 2021-02-04 DIAGNOSIS — E55.9 VITAMIN D DEFICIENCY: ICD-10-CM

## 2021-02-04 DIAGNOSIS — G89.29 CHRONIC BILATERAL LOW BACK PAIN WITHOUT SCIATICA: ICD-10-CM

## 2021-02-04 LAB
ALBUMIN SERPL-MCNC: 4.9 G/DL (ref 3.5–5.2)
ALBUMIN/GLOB SERPL: 2.3 G/DL
ALP SERPL-CCNC: 80 U/L (ref 39–117)
ALT SERPL-CCNC: 31 U/L (ref 1–33)
AST SERPL-CCNC: 27 U/L (ref 1–32)
BILIRUB SERPL-MCNC: 0.4 MG/DL (ref 0–1.2)
BUN SERPL-MCNC: 18 MG/DL (ref 8–23)
BUN/CREAT SERPL: 22.2 (ref 7–25)
CALCIUM SERPL-MCNC: 10.3 MG/DL (ref 8.6–10.5)
CHLORIDE SERPL-SCNC: 96 MMOL/L (ref 98–107)
CHOLEST SERPL-MCNC: 183 MG/DL (ref 0–200)
CO2 SERPL-SCNC: 29.6 MMOL/L (ref 22–29)
CREAT SERPL-MCNC: 0.81 MG/DL (ref 0.57–1)
GLOBULIN SER CALC-MCNC: 2.1 GM/DL
GLUCOSE SERPL-MCNC: 90 MG/DL (ref 65–99)
HDLC SERPL-MCNC: 52 MG/DL (ref 40–60)
LDLC SERPL CALC-MCNC: 101 MG/DL (ref 0–100)
POTASSIUM SERPL-SCNC: 4.5 MMOL/L (ref 3.5–5.2)
PROT SERPL-MCNC: 7 G/DL (ref 6–8.5)
SODIUM SERPL-SCNC: 139 MMOL/L (ref 136–145)
TRIGL SERPL-MCNC: 176 MG/DL (ref 0–150)
VLDLC SERPL CALC-MCNC: 30 MG/DL (ref 5–40)

## 2021-02-04 PROCEDURE — 99214 OFFICE O/P EST MOD 30 MIN: CPT | Performed by: INTERNAL MEDICINE

## 2021-02-04 RX ORDER — SIMVASTATIN 20 MG
20 TABLET ORAL
Qty: 90 TABLET | Refills: 1 | Status: SHIPPED | OUTPATIENT
Start: 2021-02-04 | End: 2021-07-28 | Stop reason: SDUPTHER

## 2021-02-04 RX ORDER — LOSARTAN POTASSIUM 25 MG/1
TABLET ORAL
Qty: 90 TABLET | Refills: 0 | OUTPATIENT
Start: 2021-02-04

## 2021-02-04 RX ORDER — HYDROCHLOROTHIAZIDE 12.5 MG/1
12.5 TABLET ORAL DAILY
Qty: 90 TABLET | Refills: 1 | Status: SHIPPED | OUTPATIENT
Start: 2021-02-04 | End: 2021-07-28 | Stop reason: SDUPTHER

## 2021-02-04 RX ORDER — OMEGA-3-ACID ETHYL ESTERS 1 G/1
2 CAPSULE, LIQUID FILLED ORAL NIGHTLY
Qty: 360 CAPSULE | Refills: 3 | Status: SHIPPED | OUTPATIENT
Start: 2021-02-04 | End: 2022-07-11 | Stop reason: SDUPTHER

## 2021-02-04 RX ORDER — METOPROLOL SUCCINATE 50 MG/1
50 TABLET, EXTENDED RELEASE ORAL 2 TIMES DAILY
Qty: 180 TABLET | Refills: 1 | Status: SHIPPED | OUTPATIENT
Start: 2021-02-04 | End: 2021-07-28 | Stop reason: SDUPTHER

## 2021-02-04 NOTE — PROGRESS NOTES
[facility]     Amanda Lawson  1940   [id number]      Patient Care Team:  Marie Soto MD as PCP - General  Marie Soto MD as PCP - Family Medicine  Holly Pierce MD as Consulting Physician (Ophthalmology)  Kenneth Giles MD as Consulting Physician (Urology)    Chief Complaint   Patient presents with   • Hyperlipidemia     f/u             HPI  Patient is a 80 y.o. female presents with fatigue. Onset of symptoms was gradual starting 2 months ago.  Chronicity acute/subacute. Severity mild.  Symptoms are associated with patient states that she has been going to bed earlier (about 930) because she is tired and when she wakes up earlier (about 5:30 AM) and so it has been a vicious cycle. Pertinent negatives no trouble going to sleep or staying asleep.   Symptoms are aggravated by possibly by the back pain, possibly by the cold weather and getting darker earlier this time of year, possibly by Covid pandemic and boredom.   Symptoms improve with doing active things.  Context she is trying to walk outside when weather okay.    HPI  Low back pain started a few months ago after doing a lot of yard work.  Pain is somewhat worse on the left than the right.  No known injury.  Gradual onset.  It has continued despite the end of yard work.  It is aggravated by vacuuming and by sitting long periods.  Tylenol may help some but she has not tried it much.  Pain does not prevent her from doing her normal ADLs and housework.  No radiation of pain down the legs.  No pain at night.  No fever or chills.  No numbness or tingling in the feet.      CHRONIC CONDITIONS  BP went up to 170/80 one night -she was in pain. It was back down next morning.  BPs at home usually 135-140/70s.  Takes her medications regularly.  No side effects.  She does avoid salt in the diet.    For hyperlipidemia, she takes simvastatin every evening.  She does try to eat a low-fat diet.  Exercising a lot less since cold  weather.  Also since the back pain.          Past Medical History:   Diagnosis Date   • Bladder cancer (CMS/HCC) 1990   • Closed fracture of nasal bone with routine healing 5/22/2020 5/22/2020 Marie Soto MD  Pain from nasal fracture has improved.  She no longer needs to take Tylenol.  There is mild residual swelling in the nasal cavity.   • Laceration of brow without complication 5/22/2020 5/22/2020 Marie Soto MD  Laceration of right brow due to fall due to syncope.    Stitches removed today.  Steri-Strips applied to a 3 mm area without good approximation.  The rest of the laceration is well-healed.  She was advised to stop using Neosporin and just use a little Vaseline on it daily.   • Plantar fasciitis 2010   • Rosacea    • Traumatic black eye, initial encounter 5/22/2020 5/22/2020 Marie Soto MD  Bilateral delmy-orbital ecchymoses due to fall due to syncope.   • Vasovagal syncope     since childhood       Past Surgical History:   Procedure Laterality Date   • ENDOMETRIAL BIOPSY  1983       Family History   Problem Relation Age of Onset   • Coronary artery disease Mother    • Hypertension Father    • Coronary artery disease Brother    • Hyperlipidemia Daughter    • Obesity Maternal Grandmother    • Mitral valve prolapse Daughter    • Breast cancer Neg Hx    • Ovarian cancer Neg Hx        Social History     Socioeconomic History   • Marital status:      Spouse name: Not on file   • Number of children: Not on file   • Years of education: Not on file   • Highest education level: Not on file   Tobacco Use   • Smoking status: Never Smoker   • Smokeless tobacco: Never Used   Substance and Sexual Activity   • Alcohol use: Not Currently     Frequency: Monthly or less     Drinks per session: 1 or 2     Binge frequency: Never   • Drug use: No   • Sexual activity: Defer       No Known Allergies    Review of Systems:     Review of Systems   Constitutional: Positive for fatigue. Negative for  "chills, fever and unexpected weight loss.   Cardiovascular: Negative for chest pain, palpitations and leg swelling.   Gastrointestinal: Negative for abdominal pain.   Genitourinary: Negative for dysuria, pelvic pain and urinary incontinence.   Musculoskeletal: Positive for back pain. Negative for gait problem.   Neurological: Negative for weakness and numbness.   Psychiatric/Behavioral: Negative for dysphoric mood. The patient is not nervous/anxious.        Vital Signs  Vitals:    02/04/21 0937 02/04/21 0942   BP: 117/92 132/82   BP Location: Right arm    Patient Position: Sitting    Cuff Size: Adult    Pulse: 66    Temp: 97.7 °F (36.5 °C)    TempSrc: Temporal    Weight: 65.8 kg (145 lb)    Height: 157.5 cm (62.01\")    PainSc:   3      Body mass index is 26.51 kg/m².      Current Outpatient Medications:   •  aspirin 81 MG tablet, Take 1 tablet by mouth Daily., Disp: 90 tablet, Rfl: 1  •  CALCIUM-VITAMIN D PO, Take 1 tablet by mouth Daily., Disp: , Rfl:   •  Cholecalciferol (VITAMIN D PO), Take 1 tablet by mouth Daily., Disp: , Rfl:   •  Coenzyme Q10 (CO Q 10) 100 MG capsule, Take 1 capsule by mouth Daily., Disp: , Rfl:   •  famotidine (PEPCID) 20 MG tablet, TAKE ONE TABLET BY MOUTH TWICE A DAY, Disp: 180 tablet, Rfl: 0  •  hydroCHLOROthiazide (HYDRODIURIL) 12.5 MG tablet, Take 1 tablet by mouth Daily., Disp: 90 tablet, Rfl: 1  •  losartan (COZAAR) 25 MG tablet, Take 1 tablet by mouth Daily., Disp: 90 tablet, Rfl: 1  •  metoprolol succinate XL (TOPROL-XL) 50 MG 24 hr tablet, Take 1 tablet by mouth 2 (Two) Times a Day., Disp: 180 tablet, Rfl: 1  •  Multiple Vitamins-Minerals (SENIOR MULTIVITAMIN PLUS PO), Take 1 tablet by mouth Daily., Disp: , Rfl:   •  omega-3 acid ethyl esters (LOVAZA) 1 g capsule, Take 2 capsules by mouth Every Night., Disp: 360 capsule, Rfl: 3  •  simvastatin (ZOCOR) 20 MG tablet, Take 1 tablet by mouth every night at bedtime., Disp: 90 tablet, Rfl: 1  •  sodium chloride (KARRIE 128) 5 % ophthalmic " solution, Apply 1 drop to eye(s) as directed by provider Every Night., Disp: , Rfl:   •  TRAVATAN Z 0.004 % solution ophthalmic solution, Apply 1 drop to eye(s) as directed by provider Every Night., Disp: , Rfl:     Physical Exam:    Physical Exam  Vitals signs and nursing note reviewed.   Constitutional:       Appearance: She is well-developed.   HENT:      Head: Normocephalic.   Eyes:      Conjunctiva/sclera: Conjunctivae normal.      Pupils: Pupils are equal, round, and reactive to light.   Neck:      Musculoskeletal: Normal range of motion and neck supple.      Thyroid: No thyromegaly.   Cardiovascular:      Rate and Rhythm: Normal rate and regular rhythm.      Heart sounds: Normal heart sounds.   Pulmonary:      Effort: Pulmonary effort is normal.      Breath sounds: Normal breath sounds. No wheezing.   Musculoskeletal: Normal range of motion.      Lumbar back: She exhibits tenderness and spasm. She exhibits no bony tenderness.   Lymphadenopathy:      Cervical: No cervical adenopathy.   Skin:     General: Skin is warm and dry.   Neurological:      Mental Status: She is alert and oriented to person, place, and time.      Sensory: Sensation is intact.      Motor: Motor function is intact.      Gait: Gait is intact.   Psychiatric:         Thought Content: Thought content normal.          ACE III MINI        Results Review:    None    CMP:  Lab Results   Component Value Date    GLU 90 02/03/2021    BUN 18 02/03/2021    CREATININE 0.81 02/03/2021    EGFRIFNONA 68 02/03/2021    EGFRIFAFRI 83 02/03/2021    BCR 22.2 02/03/2021     02/03/2021    K 4.5 02/03/2021    CO2 29.6 (H) 02/03/2021    CALCIUM 10.3 02/03/2021    PROTENTOTREF 7.0 02/03/2021    ALBUMIN 4.90 02/03/2021    LABGLOBREF 2.1 02/03/2021    LABIL2 2.3 02/03/2021    BILITOT 0.4 02/03/2021    ALKPHOS 80 02/03/2021    AST 27 02/03/2021    ALT 31 02/03/2021     HbA1c:  No results found for: HGBA1C  Microalbumin:  Lab Results   Component Value Date     MICROALBUR 37.9 07/29/2020     Lipid Panel  Lab Results   Component Value Date    CHOL 148 02/04/2019    TRIG 176 (H) 02/03/2021    HDL 52 02/03/2021     (H) 02/03/2021    AST 27 02/03/2021    ALT 31 02/03/2021       Medication Review: Medications reviewed and noted  Patient Instructions   Problem List Items Addressed This Visit        Cardiac and Vasculature    Benign essential hypertension - Primary    Overview     2/4/2021 Marie Soto MD    Continue losartan daily and metoprolol twice a day and hydrochlorothiazide daily.  Continue to avoid salt in the diet.  Avoid sodas.    Continue to monitor blood pressures at home.          Relevant Medications    losartan (COZAAR) 25 MG tablet    hydroCHLOROthiazide (HYDRODIURIL) 12.5 MG tablet    metoprolol succinate XL (TOPROL-XL) 50 MG 24 hr tablet    Other Relevant Orders    Urinalysis With Culture If Indicated -    Microalbumin / Creatinine Urine Ratio - Urine, Clean Catch    Mixed hyperlipidemia    Overview     2/4/2021 Marie Soto MD    Continue simvastatin every evening.  Continue low-fat diet and regular exercise.         Relevant Medications    omega-3 acid ethyl esters (LOVAZA) 1 g capsule    simvastatin (ZOCOR) 20 MG tablet    Other Relevant Orders    Comprehensive Metabolic Panel (Completed)    Lipid Panel (Completed)    Atherosclerosis of abdominal aorta (CMS/HCC)    Overview     2/4/2021 Marie Soto MD    Continue daily coated 81mg aspirin and continue good blood pressure control.         Relevant Medications    aspirin 81 MG tablet       Endocrine and Metabolic    Vitamin D deficiency    Overview     2/4/2021 Marie Soto MD    Continue current dose of vitamin D3 and calcium.            Infectious Diseases    Need for vaccination    Overview     2/4/2021 Marie Soto MD    She will come by our office for pneumo vax-23 about a month after her second Covid vaccine.             Musculoskeletal and Injuries    Osteopenia of  multiple sites    Overview     2/4/2021 Marie Soto MD    Last DEXA was 5/2016. Lowest T score was -1.5 in the hip.     Continue weight bearing exercises daily including using small hand weights at home.    Continue taking calcium and vitamin D.     Plan to repeat DEXA later this year due to COVID-19.         Chronic bilateral low back pain without sciatica    Overview     2/4/2021 Marie Soto MD    Refer to [facility] physical therapy at [place].    Use moist heat pack on the low back at least once a day.  Stretches every day and do some walking even if just around the house.    May use Tylenol as needed for pain.         Relevant Orders    Ambulatory Referral to Physical Therapy Evaluate and treat (Completed)       Symptoms and Signs    Other fatigue (Chronic)    Overview     2/4/2021 Marie Soto MD    Check blood work today.    She will try to be more active during the day.  She will try to gradually get her sleeping time back to 10:30 PM to 6:30 AM.    Doing PT and improving back pain may also help energy level.            Other    Overweight with body mass index (BMI) of 26 to 26.9 in adult (Chronic)    Overview     2/4/2021 Marie Soto MD    Mildly overweight BMI at 26.    Continue low-fat diet.  Avoid sugars and avoid snacking.    Increase exercise and physical activity.    Weigh every Monday morning to monitor.                  Diagnosis Plan   1. Benign essential hypertension  Urinalysis With Culture If Indicated -    Microalbumin / Creatinine Urine Ratio - Urine, Clean Catch   2. Chronic bilateral low back pain without sciatica  Ambulatory Referral to Physical Therapy Evaluate and treat   3. Other fatigue     4. Mixed hyperlipidemia  Comprehensive Metabolic Panel    Lipid Panel    simvastatin (ZOCOR) 20 MG tablet   5. Osteopenia of multiple sites     6. Atherosclerosis of abdominal aorta (CMS/HCC)     7. Vitamin D deficiency     8. Overweight with body mass index (BMI) of 26 to 26.9 in  adult     9. Need for vaccination             Plan of care reviewed with patient at the conclusion of today's visit. Education was provided regarding diagnosis, management, and any prescribed or recommended OTC medications.Patient verbalizes understanding of and agreement with management plan.         Marie Soto MD        Answers for HPI/ROS submitted by the patient on 2/1/2021   Back pain  What is the primary reason for your visit?: Back Pain  Chronicity: new  Onset: 1 to 4 weeks ago  Frequency: daily  Progression since onset: waxing and waning  Pain location: sacro-iliac  Pain quality: aching  Radiates to: left thigh  Pain - numeric: 5/10  Pain is: worse during the day  Aggravated by: bending, coughing, position, sitting  Stiffness is present: all day  bowel incontinence: No  headaches: No  leg pain: Yes  paresis: No  paresthesias: No  perianal numbness: No  tingling: Yes  Risk factors: poor posture, sedentary lifestyle

## 2021-02-04 NOTE — PATIENT INSTRUCTIONS
Patient Instructions   Problem List Items Addressed This Visit        Cardiac and Vasculature    Benign essential hypertension - Primary    Overview     2/4/2021 Marie Soto MD    Continue losartan daily and metoprolol twice a day and hydrochlorothiazide daily.  Continue to avoid salt in the diet.  Avoid sodas.    Continue to monitor blood pressures at home.          Relevant Medications    losartan (COZAAR) 25 MG tablet    hydroCHLOROthiazide (HYDRODIURIL) 12.5 MG tablet    metoprolol succinate XL (TOPROL-XL) 50 MG 24 hr tablet    Other Relevant Orders    Urinalysis With Culture If Indicated -    Microalbumin / Creatinine Urine Ratio - Urine, Clean Catch    Mixed hyperlipidemia    Overview     2/4/2021 Marie Soto MD    Continue simvastatin every evening.  Continue low-fat diet and regular exercise.         Relevant Medications    omega-3 acid ethyl esters (LOVAZA) 1 g capsule    simvastatin (ZOCOR) 20 MG tablet    Other Relevant Orders    Comprehensive Metabolic Panel (Completed)    Lipid Panel (Completed)    Atherosclerosis of abdominal aorta (CMS/HCC)    Overview     2/4/2021 Marie Soto MD    Continue daily coated 81mg aspirin and continue good blood pressure control.         Relevant Medications    aspirin 81 MG tablet       Endocrine and Metabolic    Vitamin D deficiency    Overview     2/4/2021 Marie Soto MD    Continue current dose of vitamin D3 and calcium.            Infectious Diseases    Need for vaccination    Overview     2/4/2021 Marie Soto MD    She will come by our office for pneumo vax-23 about a month after her second Covid vaccine.             Musculoskeletal and Injuries    Osteopenia of multiple sites    Overview     2/4/2021 Marie Soto MD    Last DEXA was 5/2016. Lowest T score was -1.5 in the hip.     Continue weight bearing exercises daily including using small hand weights at home.    Continue taking calcium and vitamin D.     Plan to repeat DEXA  later this year due to COVID-19.         Chronic bilateral low back pain without sciatica    Overview     2/4/2021 Marie Soto MD    Refer to [facility] physical therapy at [place].    Use moist heat pack on the low back at least once a day.  Stretches every day and do some walking even if just around the house.    May use Tylenol as needed for pain.         Relevant Orders    Ambulatory Referral to Physical Therapy Evaluate and treat (Completed)       Symptoms and Signs    Other fatigue (Chronic)    Overview     2/4/2021 Marie Soto MD    Check blood work today.    She will try to be more active during the day.  She will try to gradually get her sleeping time back to 10:30 PM to 6:30 AM.    Doing PT and improving back pain may also help energy level.            Other    Overweight with body mass index (BMI) of 26 to 26.9 in adult (Chronic)    Overview     2/4/2021 Marie Soot MD    Mildly overweight BMI at 26.    Continue low-fat diet.  Avoid sugars and avoid snacking.    Increase exercise and physical activity.    Weigh every Monday morning to monitor.                  BMI for Adults  What is BMI?  Body mass index (BMI) is a number that is calculated from a person's weight and height. BMI can help estimate how much of a person's weight is composed of fat. BMI does not measure body fat directly. Rather, it is an alternative to procedures that directly measure body fat, which can be difficult and expensive.  BMI can help identify people who may be at higher risk for certain medical problems.  What are BMI measurements used for?  BMI is used as a screening tool to identify possible weight problems. It helps determine whether a person is obese, overweight, a healthy weight, or underweight.  BMI is useful for:  · Identifying a weight problem that may be related to a medical condition or may increase the risk for medical problems.  · Promoting changes, such as changes in diet and exercise, to help reach a  "healthy weight. BMI screening can be repeated to see if these changes are working.  How is BMI calculated?  BMI involves measuring your weight in relation to your height. Both height and weight are measured, and the BMI is calculated from those numbers. This can be done either in English (U[place] or metric measurements. Note that charts and online BMI calculators are available to help you find your BMI quickly and easily without having to do these calculations yourself.  To calculate your BMI in English (U.S[place] measurements:    1. Measure your weight in pounds (lb).  2. Multiply the number of pounds by 703.  ? For example, for a person who weighs 180 lb, multiply that number by 703, which equals 126,540.  3. Measure your height in inches. Then multiply that number by itself to get a measurement called \"inches squared.\"  ? For example, for a person who is 70 inches tall, the \"inches squared\" measurement is 70 inches x 70 inches, which equals 4,900 inches squared.  4. Divide the total from step 2 (number of lb x 703) by the total from step 3 (inches squared): 126,540 ÷ 4,900 = 25.8. This is your BMI.  To calculate your BMI in metric measurements:  1. Measure your weight in kilograms (kg).  2. Measure your height in meters (m). Then multiply that number by itself to get a measurement called \"meters squared.\"  ? For example, for a person who is 1.75 m tall, the \"meters squared\" measurement is 1.75 m x 1.75 m, which is equal to 3.1 meters squared.  3. Divide the number of kilograms (your weight) by the meters squared number. In this example: 70 ÷ 3.1 = 22.6. This is your BMI.  What do the results mean?  BMI charts are used to identify whether you are underweight, normal weight, overweight, or obese. The following guidelines will be used:  · Underweight: BMI less than 18.5.  · Normal weight: BMI between 18.5 and 24.9.  · Overweight: BMI between 25 and 29.9.  · Obese: BMI of 30 or above.  Keep these notes in mind:  · Weight " includes both fat and muscle, so someone with a muscular build, such as an athlete, may have a BMI that is higher than 24.9. In cases like these, BMI is not an accurate measure of body fat.  · To determine if excess body fat is the cause of a BMI of 25 or higher, further assessments may need to be done by a health care provider.  · BMI is usually interpreted in the same way for men and women.  Where to find more information  For more information about BMI, including tools to quickly calculate your BMI, go to these websites:  · Centers for Disease Control and Prevention: www.cdc.gov  · American Heart Association: www.heart.org  · National Heart, Lung, and Blood [place]: www.nhlbi.nih.gov  Summary  · Body mass index (BMI) is a number that is calculated from a person's weight and height.  · BMI may help estimate how much of a person's weight is composed of fat. BMI can help identify those who may be at higher risk for certain medical problems.  · BMI can be measured using English measurements or metric measurements.  · BMI charts are used to identify whether you are underweight, normal weight, overweight, or obese.  This information is not intended to replace advice given to you by your health care provider. Make sure you discuss any questions you have with your health care provider.  Document Revised: 09/09/2020 Document Reviewed: 07/17/2020  [employer]vier Patient Education © 2020 [employer]

## 2021-02-05 LAB
ALBUMIN/CREAT UR: 53 MG/G CREAT (ref 0–29)
APPEARANCE UR: CLEAR
BACTERIA #/AREA URNS HPF: ABNORMAL /HPF
BACTERIA UR CULT: NORMAL
BACTERIA UR CULT: NORMAL
BILIRUB UR QL STRIP: NEGATIVE
COLOR UR: YELLOW
CREAT UR-MCNC: 54.1 MG/DL
CRYSTALS URNS MICRO: ABNORMAL
EPI CELLS #/AREA URNS HPF: ABNORMAL /HPF (ref 0–10)
GLUCOSE UR QL: NEGATIVE
HGB UR QL STRIP: NEGATIVE
KETONES UR QL STRIP: NEGATIVE
LEUKOCYTE ESTERASE UR QL STRIP: ABNORMAL
MICRO URNS: ABNORMAL
MICROALBUMIN UR-MCNC: 28.6 UG/ML
NITRITE UR QL STRIP: NEGATIVE
PH UR STRIP: 7.5 [PH] (ref 5–7.5)
PROT UR QL STRIP: NEGATIVE
RBC #/AREA URNS HPF: ABNORMAL /HPF (ref 0–2)
SP GR UR: 1.01 (ref 1–1.03)
UNIDENT CRYS URNS QL MICRO: PRESENT
URINALYSIS REFLEX: ABNORMAL
UROBILINOGEN UR STRIP-MCNC: 0.2 MG/DL (ref 0.2–1)
WBC #/AREA URNS HPF: ABNORMAL /HPF (ref 0–5)

## 2021-03-10 ENCOUNTER — OFFICE VISIT (OUTPATIENT)
Dept: INTERNAL MEDICINE | Facility: CLINIC | Age: 81
End: 2021-03-10

## 2021-03-10 VITALS
OXYGEN SATURATION: 98 % | BODY MASS INDEX: 26.31 KG/M2 | DIASTOLIC BLOOD PRESSURE: 78 MMHG | RESPIRATION RATE: 16 BRPM | TEMPERATURE: 97.1 F | WEIGHT: 143 LBS | HEIGHT: 62 IN | HEART RATE: 71 BPM | SYSTOLIC BLOOD PRESSURE: 130 MMHG

## 2021-03-10 DIAGNOSIS — H61.21 RIGHT EAR IMPACTED CERUMEN: ICD-10-CM

## 2021-03-10 DIAGNOSIS — H90.6 MIXED CONDUCTIVE AND SENSORINEURAL HEARING LOSS OF BOTH EARS: Primary | ICD-10-CM

## 2021-03-10 PROCEDURE — 69210 REMOVE IMPACTED EAR WAX UNI: CPT | Performed by: NURSE PRACTITIONER

## 2021-03-10 PROCEDURE — 99213 OFFICE O/P EST LOW 20 MIN: CPT | Performed by: NURSE PRACTITIONER

## 2021-03-10 NOTE — PROGRESS NOTES
"  Follow Up Office Visit      Patient Name: Amanda Lawson  : 1940   MRN: [id number]   Care Team: Patient Care Team:  Marie Soto MD as PCP - General  Marie Soto MD as PCP - Family Medicine  Holly Pierce MD as Consulting Physician (Ophthalmology)  Kenneth Giles MD as Consulting Physician (Urology)    Chief Complaint:    Chief Complaint   Patient presents with   • Ear Fullness     R ear clogged, pt states she is fullness in both ears       History of Present Illness: Amanda Lawson is a 80 y.o. female who is here today for issue of right ear fullness sensation.  She has known hearing loss, wears bilateral hearing aids.  Reports she had follow up with hearing aid clinic last week, was told she had impacted wax in right ear.    Reports her right ear is her \"good ear\" and has noticed a decline in her ability to hear, even with hearing aid intact.  Denies any ear pain, sore throat, sinus drainage/congestion, fever/chills or headache.    Reports she has had both her Covid 19 vaccines, has 1 week to go before she is considered \"fully vaccinated\"        Subjective      Review of Systems:   Review of Systems   Constitutional: Negative for chills and fever.   HENT: Positive for hearing loss. Negative for congestion, ear discharge, ear pain, facial swelling, rhinorrhea, sinus pressure, sore throat, swollen glands, tinnitus and trouble swallowing.    Respiratory: Negative for cough and shortness of breath.    Cardiovascular: Negative for chest pain.   Allergic/Immunologic: Negative for environmental allergies.       I have reviewed and the following portions of the patient's history were updated as appropriate: past family history, past medical history, past social history, past surgical history and problem list.    Medications:     Current Outpatient Medications:   •  aspirin 81 MG tablet, Take 1 tablet by mouth Daily., Disp: 90 tablet, Rfl: 1  •  CALCIUM-VITAMIN D PO, Take 1 tablet by " "mouth Daily., Disp: , Rfl:   •  Cholecalciferol (VITAMIN D PO), Take 1 tablet by mouth Daily., Disp: , Rfl:   •  Coenzyme Q10 (CO Q 10) 100 MG capsule, Take 1 capsule by mouth Daily., Disp: , Rfl:   •  famotidine (PEPCID) 20 MG tablet, TAKE ONE TABLET BY MOUTH TWICE A DAY, Disp: 180 tablet, Rfl: 0  •  hydroCHLOROthiazide (HYDRODIURIL) 12.5 MG tablet, Take 1 tablet by mouth Daily., Disp: 90 tablet, Rfl: 1  •  losartan (COZAAR) 25 MG tablet, Take 1 tablet by mouth Daily., Disp: 90 tablet, Rfl: 1  •  metoprolol succinate XL (TOPROL-XL) 50 MG 24 hr tablet, Take 1 tablet by mouth 2 (Two) Times a Day., Disp: 180 tablet, Rfl: 1  •  Multiple Vitamins-Minerals (SENIOR MULTIVITAMIN PLUS PO), Take 1 tablet by mouth Daily., Disp: , Rfl:   •  omega-3 acid ethyl esters (LOVAZA) 1 g capsule, Take 2 capsules by mouth Every Night., Disp: 360 capsule, Rfl: 3  •  simvastatin (ZOCOR) 20 MG tablet, Take 1 tablet by mouth every night at bedtime., Disp: 90 tablet, Rfl: 1  •  sodium chloride (KARRIE 128) 5 % ophthalmic solution, Apply 1 drop to eye(s) as directed by provider Every Night., Disp: , Rfl:   •  TRAVATAN Z 0.004 % solution ophthalmic solution, Apply 1 drop to eye(s) as directed by provider Every Night., Disp: , Rfl:     Allergies:   No Known Allergies    Objective     Physical Exam:  Vital Signs:   Vitals:    03/10/21 1105   BP: 130/78   Pulse: 71   Resp: 16   Temp: 97.1 °F (36.2 °C)   TempSrc: Temporal   SpO2: 98%   Weight: 64.9 kg (143 lb)   Height: 157.5 cm (62.01\")     Body mass index is 26.15 kg/m².     Physical Exam  Vitals and nursing note reviewed.   Constitutional:       General: She is not in acute distress.  HENT:      Head: Normocephalic and atraumatic.      Right Ear: There is impacted cerumen (complete obstruction of the ear canal with light yellow waxy cerumen).      Left Ear: Tympanic membrane, ear canal and external ear normal. There is no impacted cerumen.      Ears:      Comments: Noted patient significantly " "hard of hearing.  She did not wear hearing aids today.     Nose: Nose normal.      Mouth/Throat:      Mouth: Mucous membranes are moist.      Pharynx: Oropharynx is clear. No oropharyngeal exudate.   Eyes:      General: No scleral icterus.     Conjunctiva/sclera: Conjunctivae normal.      Pupils: Pupils are equal, round, and reactive to light.   Cardiovascular:      Rate and Rhythm: Normal rate and regular rhythm.   Pulmonary:      Effort: Pulmonary effort is normal.      Breath sounds: Wheezing present. No rhonchi or rales.   Musculoskeletal:      Cervical back: Normal range of motion and neck supple. No tenderness.   Lymphadenopathy:      Cervical: No cervical adenopathy.   Skin:     General: Skin is warm and dry.   Neurological:      Mental Status: She is alert.   Psychiatric:         Mood and Affect: Mood normal.         Thought Content: Thought content normal.         Judgment: Judgment normal.     Ear Cerumen Removal    Date/Time: 3/10/2021 11:39 AM  Performed by: Orly Palomo APRN  Authorized by: Orly Palomo APRN     Anesthesia:  Local Anesthetic: none  Location details: right ear  Patient tolerance: patient tolerated the procedure well with no immediate complications  Comments: Initial irrigation performed with moderate removal of cerumen.  Lighted curette then used to remove additional cerumen from posterior and anterior ear canal walls.  Patient tolerated well.  Reports \"I can hear now!\"  After procedure complete.   Procedure type: instrumentation and irrigation   Sedation:  Patient sedated: no            Assessment / Plan      Assessment/Plan:   Problems Addressed This Visit    ICD-10-CM ICD-9-CM   1. Mixed conductive and sensorineural hearing loss of both ears  H90.6 389.22   2. Right ear impacted cerumen  H61.21 380.4     Mixed hearing loss  - Advised to leave hearing aid out of right ear for 2-4 hours, until ear canal dry (post irrigation)  - Continue follow up with hearing aid " clinic as per their recommendations    Right ear cerumen impaction  - Resolved with irrigation and instrumentation at visit today    Advised patient to follow up in office to get pneumo-23 vaccine (recommended at last visit).  She must weight at least 1 more week as she just had second dose of Covid-19 vaccine.      Plan of care reviewed with patient at the conclusion of today's visit. Education was provided regarding diagnosis and management.  Patient verbalizes understanding of and agreement with management plan.    There are no Patient Instructions on file for this visit.    Follow Up:   Return for Next scheduled follow up.        RUY Lund  [facility] [address]    Please note that portions of this note may have been completed with a voice recognition program. Efforts were made to edit the dictations, but occasionally words are mistranscribed.

## 2021-04-29 ENCOUNTER — TELEPHONE (OUTPATIENT)
Dept: INTERNAL MEDICINE | Facility: CLINIC | Age: 81
End: 2021-04-29

## 2021-04-29 NOTE — TELEPHONE ENCOUNTER
DEXA ordered 07/28/20. Patient no showed her appt 01/11/21. Reminder sent 03/29/21. Can order be cancelled?

## 2021-04-30 DIAGNOSIS — K21.9 GERD WITHOUT ESOPHAGITIS: ICD-10-CM

## 2021-04-30 RX ORDER — LOSARTAN POTASSIUM 25 MG/1
TABLET ORAL
Qty: 85 TABLET | Refills: 4 | Status: SHIPPED | OUTPATIENT
Start: 2021-04-30 | End: 2021-07-28 | Stop reason: SDUPTHER

## 2021-04-30 RX ORDER — FAMOTIDINE 20 MG/1
TABLET, FILM COATED ORAL
Qty: 180 TABLET | Refills: 0 | Status: SHIPPED | OUTPATIENT
Start: 2021-04-30 | End: 2021-07-28 | Stop reason: SDUPTHER

## 2021-07-26 ENCOUNTER — TELEPHONE (OUTPATIENT)
Dept: INTERNAL MEDICINE | Facility: CLINIC | Age: 81
End: 2021-07-26

## 2021-07-26 NOTE — TELEPHONE ENCOUNTER
Caller: Amanda Lawson    Relationship: Self    Best call back number: [phone]     What orders LABS FOR MEDICARE WELLNESS VISIT ON 07/28/2021    In what timeframe would the patient need to come in: ASAP    Where will you receive your lab/imaging services: AT [facility]    Additional notes:THE PATIENT IS REQUESTING TO COME IN AT 8:00 AM ON 07/28/2021 TO GET THE LABS DONE, PRIOR TO HER 9:30 MEDICARE WELLNESS VISIT ON 07/28/2021.      PLEASE CALL THE PATIENT AND ADVISE WHEN ORDERS ARE IN THE SYSTEM.  THE PATIENT CALL BACK NUMBER [phone]

## 2021-07-28 ENCOUNTER — LAB (OUTPATIENT)
Dept: LAB | Facility: HOSPITAL | Age: 81
End: 2021-07-28

## 2021-07-28 ENCOUNTER — OFFICE VISIT (OUTPATIENT)
Dept: INTERNAL MEDICINE | Facility: CLINIC | Age: 81
End: 2021-07-28

## 2021-07-28 VITALS
BODY MASS INDEX: 25.83 KG/M2 | TEMPERATURE: 97.1 F | WEIGHT: 140.4 LBS | HEIGHT: 62 IN | SYSTOLIC BLOOD PRESSURE: 120 MMHG | DIASTOLIC BLOOD PRESSURE: 84 MMHG | RESPIRATION RATE: 18 BRPM | OXYGEN SATURATION: 97 % | HEART RATE: 81 BPM

## 2021-07-28 DIAGNOSIS — Z00.00 MEDICARE ANNUAL WELLNESS VISIT, SUBSEQUENT: Primary | ICD-10-CM

## 2021-07-28 DIAGNOSIS — E55.9 VITAMIN D DEFICIENCY: ICD-10-CM

## 2021-07-28 DIAGNOSIS — Z12.31 BREAST CANCER SCREENING BY MAMMOGRAM: ICD-10-CM

## 2021-07-28 DIAGNOSIS — M85.89 OSTEOPENIA OF MULTIPLE SITES: ICD-10-CM

## 2021-07-28 DIAGNOSIS — K21.9 GERD WITHOUT ESOPHAGITIS: ICD-10-CM

## 2021-07-28 DIAGNOSIS — I10 BENIGN ESSENTIAL HYPERTENSION: ICD-10-CM

## 2021-07-28 DIAGNOSIS — I70.0 ATHEROSCLEROSIS OF ABDOMINAL AORTA (HCC): ICD-10-CM

## 2021-07-28 DIAGNOSIS — N95.9 MENOPAUSAL AND POSTMENOPAUSAL DISORDER: ICD-10-CM

## 2021-07-28 DIAGNOSIS — E78.2 MIXED HYPERLIPIDEMIA: ICD-10-CM

## 2021-07-28 DIAGNOSIS — Z00.00 ANNUAL PHYSICAL EXAM: ICD-10-CM

## 2021-07-28 PROBLEM — E66.3 OVERWEIGHT WITH BODY MASS INDEX (BMI) OF 26 TO 26.9 IN ADULT: Chronic | Status: RESOLVED | Noted: 2021-02-04 | Resolved: 2021-07-28

## 2021-07-28 PROCEDURE — 93000 ELECTROCARDIOGRAM COMPLETE: CPT | Performed by: INTERNAL MEDICINE

## 2021-07-28 PROCEDURE — 1170F FXNL STATUS ASSESSED: CPT | Performed by: INTERNAL MEDICINE

## 2021-07-28 PROCEDURE — 99397 PER PM REEVAL EST PAT 65+ YR: CPT | Performed by: INTERNAL MEDICINE

## 2021-07-28 PROCEDURE — 1126F AMNT PAIN NOTED NONE PRSNT: CPT | Performed by: INTERNAL MEDICINE

## 2021-07-28 PROCEDURE — 96160 PT-FOCUSED HLTH RISK ASSMT: CPT | Performed by: INTERNAL MEDICINE

## 2021-07-28 PROCEDURE — G0439 PPPS, SUBSEQ VISIT: HCPCS | Performed by: INTERNAL MEDICINE

## 2021-07-28 RX ORDER — HYDROCHLOROTHIAZIDE 12.5 MG/1
12.5 TABLET ORAL DAILY
Qty: 90 TABLET | Refills: 1 | Status: SHIPPED | OUTPATIENT
Start: 2021-07-28 | End: 2022-01-07

## 2021-07-28 RX ORDER — FAMOTIDINE 20 MG/1
20 TABLET, FILM COATED ORAL 2 TIMES DAILY
Qty: 180 TABLET | Refills: 1 | Status: SHIPPED | OUTPATIENT
Start: 2021-07-28 | End: 2021-07-28

## 2021-07-28 RX ORDER — TIMOLOL MALEATE 5 MG/ML
1 SOLUTION OPHTHALMIC NIGHTLY
COMMUNITY

## 2021-07-28 RX ORDER — SIMVASTATIN 20 MG
20 TABLET ORAL
Qty: 90 TABLET | Refills: 1 | Status: SHIPPED | OUTPATIENT
Start: 2021-07-28 | End: 2022-01-07

## 2021-07-28 RX ORDER — FAMOTIDINE 20 MG/1
TABLET, FILM COATED ORAL
Qty: 180 TABLET | Refills: 1 | Status: SHIPPED | OUTPATIENT
Start: 2021-07-28 | End: 2022-08-16 | Stop reason: SDUPTHER

## 2021-07-28 RX ORDER — LOSARTAN POTASSIUM 25 MG/1
25 TABLET ORAL DAILY
Qty: 90 TABLET | Refills: 1 | Status: SHIPPED | OUTPATIENT
Start: 2021-07-28 | End: 2022-02-16 | Stop reason: SDUPTHER

## 2021-07-28 RX ORDER — METOPROLOL SUCCINATE 50 MG/1
50 TABLET, EXTENDED RELEASE ORAL 2 TIMES DAILY
Qty: 180 TABLET | Refills: 1 | Status: SHIPPED | OUTPATIENT
Start: 2021-07-28 | End: 2022-01-07

## 2021-07-28 NOTE — PATIENT INSTRUCTIONS
Patient Instructions   Problem List Items Addressed This Visit        Cardiac and Vasculature    Benign essential hypertension    Overview     7/28/2021 Marie Soto MD    Continue losartan daily and metoprolol twice a day and hydrochlorothiazide daily.  Continue to avoid salt in the diet.  Avoid sodas.    Continue to monitor blood pressures at home.          Relevant Medications    hydroCHLOROthiazide (HYDRODIURIL) 12.5 MG tablet    losartan (COZAAR) 25 MG tablet    metoprolol succinate XL (TOPROL-XL) 50 MG 24 hr tablet    Other Relevant Orders    Comprehensive Metabolic Panel    CBC & Differential    Microalbumin / Creatinine Urine Ratio - Urine, Clean Catch    Urinalysis With Microscopic - Urine, Clean Catch    Mixed hyperlipidemia    Overview     7/28/2021 Marie Soto MD    Continue simvastatin every evening.  Continue low-fat diet and regular exercise.         Relevant Medications    omega-3 acid ethyl esters (LOVAZA) 1 g capsule    simvastatin (ZOCOR) 20 MG tablet    Other Relevant Orders    Lipid Panel    TSH    Atherosclerosis of abdominal aorta (CMS/HCC)    Overview     7/28/2021 Marie Soto MD    Continue daily coated 81mg aspirin and continue good blood pressure control.         Relevant Medications    aspirin 81 MG tablet       Endocrine and Metabolic    Vitamin D deficiency    Overview     7/28/2021 Marie Soto MD    Continue current dose of vitamin D3 and calcium.         Relevant Orders    Vitamin D 25 Hydroxy       Gastrointestinal Abdominal     GERD without esophagitis    Overview     7/28/2021 Marie Soto MD    Continue famotidine twice a day.  Continue to avoid eating close to bedtime and avoid large meals.         Relevant Medications    famotidine (PEPCID) 20 MG tablet       Health Encounters    Annual physical exam    Overview     7/28/2021 Marie Soto MD    She will get Pneumovax 23 Valent at her pharmacy.  She has been advised to wait a month and get the new  shingles vaccine at her pharmacy.    Mammogram and DEXA have been ordered today.         Medicare annual wellness visit, subsequent - Primary    Overview     7/28/2021 Marie Soto MD    She will get Pneumovax 23 Valent at her pharmacy.  She has been advised to wait a month and get the new shingles vaccine at her pharmacy.    Mammogram and DEXA have been ordered today.              Musculoskeletal and Injuries    Osteopenia of multiple sites    Overview     7/28/2021 Marie Soto MD    Last DEXA was 5/2016. Lowest T score was -1.5 in the hip.     Continue weight bearing exercises daily including using small hand weights at home.    Continue taking calcium and vitamin D.     Plan to repeat DEXA  this year.           Other Visit Diagnoses     Breast cancer screening by mammogram        Relevant Orders    Mammo Screening Digital Tomosynthesis Bilateral With CAD    Menopausal and postmenopausal disorder        Relevant Orders    DEXA Bone Density Axial          Fall Prevention in the Home, Adult  Falls can cause injuries and can affect people from all age groups. There are many simple things that you can do to make your home safe and to help prevent falls. Ask for help when making these changes, if needed.  What actions can I take to prevent falls?  General instructions  · Use good lighting in all rooms. Replace any light bulbs that burn out.  · Turn on lights if it is dark. Use night-lights.  · Place frequently used items in easy-to-reach places. Lower the shelves around your home if necessary.  · Set up furniture so that there are clear paths around it. Avoid moving your furniture around.  · Remove throw rugs and other tripping hazards from the floor.  · Avoid walking on wet floors.  · Fix any uneven floor surfaces.  · Add color or contrast paint or tape to grab bars and handrails in your home. Place contrasting color strips on the first and last steps of stairways.  · When you use a stepladder, make sure  that it is completely opened and that the sides are firmly locked. Have someone hold the ladder while you are using it. Do not climb a closed stepladder.  · Be aware of any and all pets.  What can I do in the bathroom?         · Keep the floor dry. Immediately clean up any water that spills onto the floor.  · Remove soap buildup in the tub or shower on a regular basis.  · Use non-skid mats or decals on the floor of the tub or shower.  · Attach bath mats securely with double-sided, non-slip rug tape.  · If you need to sit down while you are in the shower, use a plastic, non-slip stool.  · Install grab bars by the toilet and in the tub and shower. Do not use towel bars as grab bars.  What can I do in the bedroom?  · Make sure that a bedside light is easy to reach.  · Do not use oversized bedding that drapes onto the floor.  · Have a firm chair that has side arms to use for getting dressed.  What can I do in the kitchen?  · Clean up any spills right away.  · If you need to reach for something above you, use a sturdy step stool that has a grab bar.  · Keep electrical cables out of the way.  · Do not use floor polish or wax that makes floors slippery. If you must use wax, make sure that it is non-skid floor wax.  What can I do in the stairways?  · Do not leave any items on the stairs.  · Make sure that you have a light switch at the top of the stairs and the bottom of the stairs. Have them installed if you do not have them.  · Make sure that there are handrails on both sides of the stairs. Fix handrails that are broken or loose. Make sure that handrails are as long as the stairways.  · Install non-slip stair treads on all stairs in your home.  · Avoid having throw rugs at the top or bottom of stairways, or secure the rugs with carpet tape to prevent them from moving.  · Choose a carpet design that does not hide the edge of steps on the stairway.  · Check any carpeting to make sure that it is firmly attached to the  stairs. Fix any carpet that is loose or worn.  What can I do on the outside of my home?  · Use bright outdoor lighting.  · Regularly repair the edges of walkways and driveways and fix any cracks.  · Remove high doorway thresholds.  · Trim any shrubbery on the main path into your home.  · Regularly check that handrails are securely fastened and in good repair. Both sides of any steps should have handrails.  · Install guardrails along the edges of any raised decks or porches.  · Clear walkways of debris and clutter, including tools and rocks.  · Have leaves, snow, and ice cleared regularly.  · Use sand or salt on walkways during winter months.  · In the garage, clean up any spills right away, including grease or oil spills.  What other actions can I take?  · Wear closed-toe shoes that fit well and support your feet. Wear shoes that have rubber soles or low heels.  · Use mobility aids as needed, such as canes, walkers, scooters, and crutches.  · Review your medicines with your health care provider. Some medicines can cause dizziness or changes in blood pressure, which increase your risk of falling.  Talk with your health care provider about other ways that you can decrease your risk of falls. This may include working with a physical therapist or  to improve your strength, balance, and endurance.  Where to find more information  · Centers for Disease Control and Prevention, [place]: https://www.cdc.gov  · [employer] [place] [employer] Aging: https://xe3frkp.rajeev.nih.gov  Contact a health care provider if:  · You are afraid of falling at home.  · You feel weak, drowsy, or dizzy at home.  · You fall at home.  Summary  · There are many simple things that you can do to make your home safe and to help prevent falls.  · Ways to make your home safe include removing tripping hazards and installing grab bars in the bathroom.  · Ask for help when making these changes in your home.  This information is not intended to replace advice  given to you by your health care provider. Make sure you discuss any questions you have with your health care provider.  Document Revised: 11/30/2018 Document Reviewed: 08/02/2018  [employer]vier Patient Education © 2021 [employer]

## 2021-07-28 NOTE — PROGRESS NOTES
The ABCs of the Annual Wellness Visit  Initial Medicare Wellness Visit    Chief Complaint   Patient presents with   • Medicare Wellness-subsequent   • Hypertension     f/u   • Hyperlipidemia   • Med Refill     all medications per pt       Subjective   History of Present Illness:  Amanda Lawson is a 81 y.o. female who presents for an Initial Medicare Wellness Visit.    CHRONIC CONDITIONS:    Blood pressures very well controlled on losartan and hydrochlorothiazide and metoprolol.  Eats a low-salt diet.    Taking simvastatin for hyperlipidemia every evening.  Eats a low-fat diet and exercises and walks and does yard work.  She stays very active.    GERD symptoms have been controlled on famotidine.    HEALTH RISK ASSESSMENT    Recent Hospitalizations:  No hospitalization(s) within the last year.    Current Medical Providers:  Patient Care Team:  Marie Soto MD as PCP - General  Marie Soto MD as PCP - Family Medicine  Holly Pierce MD as Consulting Physician (Ophthalmology)  Kenneth Giles MD as Consulting Physician (Urology)    Smoking Status:  Social History     Tobacco Use   Smoking Status Never Smoker   Smokeless Tobacco Never Used       Alcohol Consumption:  Social History     Substance and Sexual Activity   Alcohol Use Not Currently       Depression Screen:   PHQ-2/PHQ-9 Depression Screening 7/28/2021   Little interest or pleasure in doing things 0   Feeling down, depressed, or hopeless 0   Total Score 0       Fall Risk Screen:  STEADI Fall Risk Assessment was completed, and patient is at LOW risk for falls.Assessment completed on:7/28/2021    Health Habits and Functional and Cognitive Screening:  Functional & Cognitive Status 7/28/2021   Do you have difficulty preparing food and eating? No   Do you have difficulty bathing yourself, getting dressed or grooming yourself? No   Do you have difficulty using the toilet? No   Do you have difficulty moving around from place to place? No    Do you have trouble with steps or getting out of a bed or a chair? No   Current Diet Well Balanced Diet   Dental Exam Up to date   Eye Exam Up to date   Exercise (times per week) 4 times per week   Current Exercises Include Walking   Current Exercise Activities Include -   Do you need help using the phone?  No   Are you deaf or do you have serious difficulty hearing?  Yes   Do you need help with transportation? No   Do you need help shopping? No   Do you need help preparing meals?  No   Do you need help with housework?  No   Do you need help with laundry? No   Do you need help taking your medications? No   Do you need help managing money? No   Do you ever drive or ride in a car without wearing a seat belt? No   Have you felt unusual stress, anger or loneliness in the last month? No   Who do you live with? Alone   If you need help, do you have trouble finding someone available to you? No   Have you been bothered in the last four weeks by sexual problems? No   Do you have difficulty concentrating, remembering or making decisions? No       Does the patient have evidence of cognitive impairment? No    Asprin use counseling:Taking ASA appropriately as indicated    Age-appropriate Screening Schedule:  Refer to the list below for future screening recommendations based on patient's age, sex and/or medical conditions. Orders for these recommended tests are listed in the plan section. The patient has been provided with a written plan.    Health Maintenance   Topic Date Due   • ZOSTER VACCINE (2 of 3) 11/26/2010   • DXA SCAN  05/26/2018   • INFLUENZA VACCINE  10/01/2021   • LIPID PANEL  02/03/2022   • TDAP/TD VACCINES (3 - Td or Tdap) 05/14/2030        The following portions of the patient's history were reviewed and updated as appropriate: allergies, current medications, past family history, past medical history, past social history, past surgical history and problem list.    Outpatient Medications Prior to Visit    Medication Sig Dispense Refill   • aspirin 81 MG tablet Take 1 tablet by mouth Daily. 90 tablet 1   • CALCIUM-VITAMIN D PO Take 1 tablet by mouth Daily.     • Cholecalciferol (VITAMIN D PO) Take 1 tablet by mouth Daily.     • Coenzyme Q10 (CO Q 10) 100 MG capsule Take 1 capsule by mouth Daily.     • Multiple Vitamins-Minerals (SENIOR MULTIVITAMIN PLUS PO) Take 1 tablet by mouth Daily.     • omega-3 acid ethyl esters (LOVAZA) 1 g capsule Take 2 capsules by mouth Every Night. 360 capsule 3   • sodium chloride (KARRIE 128) 5 % ophthalmic solution Apply 1 drop to eye(s) as directed by provider Every Night.     • timolol (TIMOPTIC-XR) 0.5 % ophthalmic gel-forming Administer 1 drop into the left eye Daily.     • famotidine (PEPCID) 20 MG tablet TAKE ONE TABLET BY MOUTH TWICE A  tablet 0   • hydroCHLOROthiazide (HYDRODIURIL) 12.5 MG tablet Take 1 tablet by mouth Daily. 90 tablet 1   • losartan (COZAAR) 25 MG tablet TAKE ONE TABLET BY MOUTH DAILY 85 tablet 4   • metoprolol succinate XL (TOPROL-XL) 50 MG 24 hr tablet Take 1 tablet by mouth 2 (Two) Times a Day. 180 tablet 1   • simvastatin (ZOCOR) 20 MG tablet Take 1 tablet by mouth every night at bedtime. 90 tablet 1   • TRAVATAN Z 0.004 % solution ophthalmic solution Apply 1 drop to eye(s) as directed by provider Every Night.       No facility-administered medications prior to visit.       Patient Active Problem List   Diagnosis   • Vitamin D deficiency   • Personal history of bladder cancer   • Benign essential hypertension   • Allergic rhinitis   • Open-angle glaucoma   • Osteopenia of multiple sites   • Pain of right hip joint   • Risk for falls   • Mixed hyperlipidemia   • Bunion   • Atherosclerosis of abdominal aorta (CMS/HCC)   • GERD without esophagitis   • Prolapsed uterus   • Stress incontinence of urine   • Annual physical exam   • Need for vaccination   • Chronic bilateral low back pain without sciatica   • Other fatigue   • Medicare annual wellness visit,  "subsequent       Advanced Care Planning:  ACP discussion was held with the patient during this visit. Patient has an advance directive (not in EMR), copy requested.    Review of Systems   Constitutional: Negative for chills, fatigue and fever.   HENT: Negative for congestion, ear pain, hearing loss and sinus pressure.    Eyes: Negative for visual disturbance.   Respiratory: Negative for cough, chest tightness, shortness of breath and wheezing.    Cardiovascular: Negative for chest pain, palpitations and leg swelling.   Gastrointestinal: Negative for abdominal pain, blood in stool and constipation.   Endocrine: Negative for cold intolerance and heat intolerance.   Genitourinary: Negative for frequency.   Musculoskeletal: Negative for arthralgias, back pain and gait problem.   Skin: Negative for color change and rash.   Allergic/Immunologic: Negative for environmental allergies.   Neurological: Negative for dizziness and headaches.   Hematological: Negative for adenopathy. Does not bruise/bleed easily.   Psychiatric/Behavioral: Negative for dysphoric mood, sleep disturbance and suicidal ideas. The patient is not nervous/anxious.        Compared to one year ago, the patient feels her physical health is the same.  Compared to one year ago, the patient feels her mental health is the same.    Reviewed chart for potential of high risk medication in the elderly: yes  Reviewed chart for potential of harmful drug interactions in the elderly:yes    Objective         Vitals:    07/28/21 0946   BP: 120/84   BP Location: Left arm   Patient Position: Sitting   Cuff Size: Adult   Pulse: 81   Resp: 18   Temp: 97.1 °F (36.2 °C)   TempSrc: Infrared   SpO2: 97%   Weight: 63.7 kg (140 lb 6.4 oz)   Height: 157.5 cm (62.01\")   PainSc: 0-No pain       Body mass index is 25.67 kg/m².  Discussed the patient's BMI with her. The BMI is in the acceptable range.    Physical Exam  Vitals and nursing note reviewed.   Constitutional:       " Appearance: She is well-developed.   HENT:      Head: Normocephalic.   Eyes:      Conjunctiva/sclera: Conjunctivae normal.      Pupils: Pupils are equal, round, and reactive to light.   Neck:      Thyroid: No thyromegaly.   Cardiovascular:      Rate and Rhythm: Normal rate and regular rhythm.      Heart sounds: Normal heart sounds.   Pulmonary:      Effort: Pulmonary effort is normal.      Breath sounds: Normal breath sounds. No wheezing.   Chest:      Breasts:         Right: No inverted nipple, mass, nipple discharge, skin change or tenderness.         Left: No inverted nipple, mass, nipple discharge, skin change or tenderness.   Abdominal:      General: Bowel sounds are normal.      Palpations: Abdomen is soft.      Tenderness: There is no abdominal tenderness.   Musculoskeletal:         General: No tenderness. Normal range of motion.      Cervical back: Normal range of motion and neck supple.   Lymphadenopathy:      Cervical: No cervical adenopathy.   Skin:     General: Skin is warm and dry.      Findings: No rash.   Neurological:      Mental Status: She is alert and oriented to person, place, and time.      Cranial Nerves: No cranial nerve deficit.      Sensory: No sensory deficit.      Coordination: Coordination normal.      Gait: Gait normal.   Psychiatric:         Attention and Perception: Attention normal.         Mood and Affect: Mood normal.         Speech: Speech normal.         Behavior: Behavior normal.         Thought Content: Thought content normal.         Cognition and Memory: Cognition normal.         Judgment: Judgment normal.       ECG 12 Lead    Date/Time: 7/28/2021 10:39 AM  Performed by: Marie Soto MD  Authorized by: Marie Soto MD   Comparison: compared with previous ECG   Similar to previous ECG  Rhythm: sinus rhythm  Rate: normal  BPM: 72  Conduction: non-specific intraventricular conduction delay  ST Segments: ST segments normal  T Waves: T waves normal  QRS axis:  normal    Clinical impression: non-specific ECG                    Assessment/Plan   Medicare Risks and Personalized Health Plan  CMS Preventative Services Quick Reference  Cardiovascular risk  Osteoporosis Risk    The above risks/problems have been discussed with the patient.  Pertinent information has been shared with the patient in the After Visit Summary.  Follow up plans and orders are seen below in the Assessment/Plan Section.  Patient Instructions   Problem List Items Addressed This Visit        Cardiac and Vasculature    Benign essential hypertension    Overview     7/28/2021 Marie Soto MD    Continue losartan daily and metoprolol twice a day and hydrochlorothiazide daily.  Continue to avoid salt in the diet.  Avoid sodas.    Continue to monitor blood pressures at home.          Relevant Medications    hydroCHLOROthiazide (HYDRODIURIL) 12.5 MG tablet    losartan (COZAAR) 25 MG tablet    metoprolol succinate XL (TOPROL-XL) 50 MG 24 hr tablet    Other Relevant Orders    Comprehensive Metabolic Panel    CBC & Differential    Microalbumin / Creatinine Urine Ratio - Urine, Clean Catch    Urinalysis With Microscopic - Urine, Clean Catch    Mixed hyperlipidemia    Overview     7/28/2021 Marie Soto MD    Continue simvastatin every evening.  Continue low-fat diet and regular exercise.         Relevant Medications    omega-3 acid ethyl esters (LOVAZA) 1 g capsule    simvastatin (ZOCOR) 20 MG tablet    Other Relevant Orders    Lipid Panel    TSH    Atherosclerosis of abdominal aorta (CMS/HCC)    Overview     7/28/2021 Marie Soto MD    Continue daily coated 81mg aspirin and continue good blood pressure control.         Relevant Medications    aspirin 81 MG tablet       Endocrine and Metabolic    Vitamin D deficiency    Overview     7/28/2021 Marie Soto MD    Continue current dose of vitamin D3 and calcium.         Relevant Orders    Vitamin D 25 Hydroxy       Gastrointestinal Abdominal      GERD without esophagitis    Overview     7/28/2021 Marie Soto MD    Continue famotidine twice a day.  Continue to avoid eating close to bedtime and avoid large meals.         Relevant Medications    famotidine (PEPCID) 20 MG tablet       Health Encounters    Annual physical exam    Overview     7/28/2021 Marie Soto MD    She will get Pneumovax 23 Valent at her pharmacy.  She has been advised to wait a month and get the new shingles vaccine at her pharmacy.    Mammogram and DEXA have been ordered today.         Medicare annual wellness visit, subsequent - Primary    Overview     7/28/2021 Marie Soto MD    She will get Pneumovax 23 Valent at her pharmacy.  She has been advised to wait a month and get the new shingles vaccine at her pharmacy.    Mammogram and DEXA have been ordered today.              Musculoskeletal and Injuries    Osteopenia of multiple sites    Overview     7/28/2021 Marie Soto MD    Last DEXA was 5/2016. Lowest T score was -1.5 in the hip.     Continue weight bearing exercises daily including using small hand weights at home.    Continue taking calcium and vitamin D.     Plan to repeat DEXA  this year.           Other Visit Diagnoses     Breast cancer screening by mammogram        Relevant Orders    Mammo Screening Digital Tomosynthesis Bilateral With CAD    Menopausal and postmenopausal disorder        Relevant Orders    DEXA Bone Density Axial           Follow Up:  Return in about 6 months (around 1/28/2022) for recheck fasting.     An After Visit Summary and PPPS were given to the patient.

## 2021-07-29 LAB
25(OH)D3+25(OH)D2 SERPL-MCNC: 73.1 NG/ML (ref 30–100)
ALBUMIN SERPL-MCNC: 4.9 G/DL (ref 3.5–5.2)
ALBUMIN/CREAT UR: 33 MG/G CREAT (ref 0–29)
ALBUMIN/GLOB SERPL: 2.3 G/DL
ALP SERPL-CCNC: 82 U/L (ref 39–117)
ALT SERPL-CCNC: 22 U/L (ref 1–33)
APPEARANCE UR: ABNORMAL
AST SERPL-CCNC: 24 U/L (ref 1–32)
BACTERIA #/AREA URNS HPF: ABNORMAL /HPF
BASOPHILS # BLD AUTO: 0.04 10*3/MM3 (ref 0–0.2)
BASOPHILS NFR BLD AUTO: 0.7 % (ref 0–1.5)
BILIRUB SERPL-MCNC: 0.4 MG/DL (ref 0–1.2)
BILIRUB UR QL STRIP: NEGATIVE
BUN SERPL-MCNC: 17 MG/DL (ref 8–23)
BUN/CREAT SERPL: 19.5 (ref 7–25)
CALCIUM SERPL-MCNC: 10.3 MG/DL (ref 8.6–10.5)
CASTS URNS MICRO: ABNORMAL
CHLORIDE SERPL-SCNC: 98 MMOL/L (ref 98–107)
CHOLEST SERPL-MCNC: 185 MG/DL (ref 0–200)
CO2 SERPL-SCNC: 31.9 MMOL/L (ref 22–29)
COLOR UR: YELLOW
CREAT SERPL-MCNC: 0.87 MG/DL (ref 0.57–1)
CREAT UR-MCNC: 88.2 MG/DL
EOSINOPHIL # BLD AUTO: 0.05 10*3/MM3 (ref 0–0.4)
EOSINOPHIL NFR BLD AUTO: 0.9 % (ref 0.3–6.2)
EPI CELLS #/AREA URNS HPF: ABNORMAL /HPF
ERYTHROCYTE [DISTWIDTH] IN BLOOD BY AUTOMATED COUNT: 12.2 % (ref 12.3–15.4)
GLOBULIN SER CALC-MCNC: 2.1 GM/DL
GLUCOSE SERPL-MCNC: 96 MG/DL (ref 65–99)
GLUCOSE UR QL: NEGATIVE
HCT VFR BLD AUTO: 42.3 % (ref 34–46.6)
HDLC SERPL-MCNC: 57 MG/DL (ref 40–60)
HGB BLD-MCNC: 14.4 G/DL (ref 12–15.9)
HGB UR QL STRIP: ABNORMAL
IMM GRANULOCYTES # BLD AUTO: 0.03 10*3/MM3 (ref 0–0.05)
IMM GRANULOCYTES NFR BLD AUTO: 0.5 % (ref 0–0.5)
KETONES UR QL STRIP: NEGATIVE
LDLC SERPL CALC-MCNC: 101 MG/DL (ref 0–100)
LEUKOCYTE ESTERASE UR QL STRIP: ABNORMAL
LYMPHOCYTES # BLD AUTO: 1.86 10*3/MM3 (ref 0.7–3.1)
LYMPHOCYTES NFR BLD AUTO: 31.7 % (ref 19.6–45.3)
MCH RBC QN AUTO: 30.1 PG (ref 26.6–33)
MCHC RBC AUTO-ENTMCNC: 34 G/DL (ref 31.5–35.7)
MCV RBC AUTO: 88.5 FL (ref 79–97)
MICROALBUMIN UR-MCNC: 28.8 UG/ML
MONOCYTES # BLD AUTO: 0.68 10*3/MM3 (ref 0.1–0.9)
MONOCYTES NFR BLD AUTO: 11.6 % (ref 5–12)
NEUTROPHILS # BLD AUTO: 3.2 10*3/MM3 (ref 1.7–7)
NEUTROPHILS NFR BLD AUTO: 54.6 % (ref 42.7–76)
NITRITE UR QL STRIP: NEGATIVE
NRBC BLD AUTO-RTO: 0 /100 WBC (ref 0–0.2)
PH UR STRIP: 7.5 [PH] (ref 5–8)
PLATELET # BLD AUTO: 187 10*3/MM3 (ref 140–450)
POTASSIUM SERPL-SCNC: 4.5 MMOL/L (ref 3.5–5.2)
PROT SERPL-MCNC: 7 G/DL (ref 6–8.5)
PROT UR QL STRIP: NEGATIVE
RBC # BLD AUTO: 4.78 10*6/MM3 (ref 3.77–5.28)
RBC #/AREA URNS HPF: ABNORMAL /HPF
SODIUM SERPL-SCNC: 137 MMOL/L (ref 136–145)
SP GR UR: 1.01 (ref 1–1.03)
TRIGL SERPL-MCNC: 153 MG/DL (ref 0–150)
TSH SERPL DL<=0.005 MIU/L-ACNC: 3.35 UIU/ML (ref 0.27–4.2)
URNS CMNT MICRO: ABNORMAL
UROBILINOGEN UR STRIP-MCNC: ABNORMAL MG/DL
VLDLC SERPL CALC-MCNC: 27 MG/DL (ref 5–40)
WBC # BLD AUTO: 5.86 10*3/MM3 (ref 3.4–10.8)
WBC #/AREA URNS HPF: ABNORMAL /HPF

## 2021-08-03 DIAGNOSIS — E78.2 MIXED HYPERLIPIDEMIA: ICD-10-CM

## 2021-08-03 RX ORDER — SIMVASTATIN 20 MG
TABLET ORAL
Qty: 90 TABLET | Refills: 1 | OUTPATIENT
Start: 2021-08-03

## 2021-08-03 RX ORDER — HYDROCHLOROTHIAZIDE 12.5 MG/1
TABLET ORAL
Qty: 90 TABLET | Refills: 1 | OUTPATIENT
Start: 2021-08-03

## 2021-08-03 RX ORDER — METOPROLOL SUCCINATE 50 MG/1
TABLET, EXTENDED RELEASE ORAL
Qty: 180 TABLET | Refills: 1 | OUTPATIENT
Start: 2021-08-03

## 2021-09-14 ENCOUNTER — HOSPITAL ENCOUNTER (OUTPATIENT)
Dept: MAMMOGRAPHY | Facility: HOSPITAL | Age: 81
Discharge: HOME | End: 2021-09-14
Admitting: INTERNAL MEDICINE

## 2021-09-14 DIAGNOSIS — Z12.31 BREAST CANCER SCREENING BY MAMMOGRAM: ICD-10-CM

## 2021-09-14 PROCEDURE — 77067 SCR MAMMO BI INCL CAD: CPT

## 2021-09-14 PROCEDURE — 77063 BREAST TOMOSYNTHESIS BI: CPT

## 2021-09-14 PROCEDURE — 77067 SCR MAMMO BI INCL CAD: CPT | Performed by: RADIOLOGY

## 2021-09-14 PROCEDURE — 77063 BREAST TOMOSYNTHESIS BI: CPT | Performed by: RADIOLOGY

## 2021-10-21 ENCOUNTER — HOSPITAL ENCOUNTER (OUTPATIENT)
Dept: MAMMOGRAPHY | Facility: HOSPITAL | Age: 81
Discharge: HOME | End: 2021-10-21
Admitting: RADIOLOGY

## 2021-10-21 ENCOUNTER — TRANSCRIBE ORDERS (OUTPATIENT)
Dept: MAMMOGRAPHY | Facility: HOSPITAL | Age: 81
End: 2021-10-21

## 2021-10-21 DIAGNOSIS — R92.8 ABNORMAL MAMMOGRAM: Primary | ICD-10-CM

## 2021-10-21 DIAGNOSIS — R92.8 ABNORMAL MAMMOGRAM: ICD-10-CM

## 2021-10-21 PROCEDURE — 77065 DX MAMMO INCL CAD UNI: CPT

## 2021-10-21 PROCEDURE — 77065 DX MAMMO INCL CAD UNI: CPT | Performed by: RADIOLOGY

## 2021-10-29 ENCOUNTER — HOSPITAL ENCOUNTER (OUTPATIENT)
Dept: BONE DENSITY | Facility: HOSPITAL | Age: 81
Discharge: HOME | End: 2021-10-29
Admitting: INTERNAL MEDICINE

## 2021-10-29 DIAGNOSIS — N95.9 MENOPAUSAL AND POSTMENOPAUSAL DISORDER: ICD-10-CM

## 2021-10-29 PROCEDURE — 77080 DXA BONE DENSITY AXIAL: CPT

## 2021-11-19 ENCOUNTER — OFFICE VISIT (OUTPATIENT)
Dept: INTERNAL MEDICINE | Facility: CLINIC | Age: 81
End: 2021-11-19

## 2021-11-19 ENCOUNTER — PATIENT MESSAGE (OUTPATIENT)
Dept: INTERNAL MEDICINE | Facility: CLINIC | Age: 81
End: 2021-11-19

## 2021-11-19 VITALS
HEIGHT: 62 IN | TEMPERATURE: 97.3 F | DIASTOLIC BLOOD PRESSURE: 80 MMHG | HEART RATE: 64 BPM | WEIGHT: 140 LBS | SYSTOLIC BLOOD PRESSURE: 122 MMHG | BODY MASS INDEX: 25.76 KG/M2

## 2021-11-19 DIAGNOSIS — M54.31 SCIATICA OF RIGHT SIDE: Primary | ICD-10-CM

## 2021-11-19 PROCEDURE — 99213 OFFICE O/P EST LOW 20 MIN: CPT | Performed by: NURSE PRACTITIONER

## 2021-11-19 NOTE — PROGRESS NOTES
Amanda Lawson  1940  [id number]  Patient Care Team:  Marie Soto MD as PCP - General  Marie Soto MD as PCP - Family Medicine  Holly Pierce MD as Consulting Physician (Ophthalmology)  Kenneth Giles MD as Consulting Physician (Urology)    Amanda Lawson is a pleasant 81 y.o. female who presents for evaluation of Leg Pain (right leg)    Chief Complaint   Patient presents with   • Leg Pain     right leg       HPI:   right sided sciatica exacerbation 8-9 days ago, no provoking factors.  She is active daily. Worse in am 5/10, better with meds and activity. She is doing some stretching exercises.  She would like to see PT. Naproxen is helping but she know she can not use long term  Past Medical History:   Diagnosis Date   • Bladder cancer (HCC) 1990   • Closed fracture of nasal bone with routine healing 5/22/2020 5/22/2020 Marie Soto MD  Pain from nasal fracture has improved.  She no longer needs to take Tylenol.  There is mild residual swelling in the nasal cavity.   • Laceration of brow without complication 5/22/2020 5/22/2020 Marie Soto MD  Laceration of right brow due to fall due to syncope.    Stitches removed today.  Steri-Strips applied to a 3 mm area without good approximation.  The rest of the laceration is well-healed.  She was advised to stop using Neosporin and just use a little Vaseline on it daily.   • Overweight with body mass index (BMI) of 26 to 26.9 in adult 2/4/2021 2/4/2021 Marie Soto MD  Mildly overweight BMI at 26.  Continue low-fat diet.  Avoid sugars and avoid snacking.  Increase exercise and physical activity.  Weigh every Monday morning to monitor.   • Plantar fasciitis 2010   • Rosacea    • Traumatic black eye, initial encounter 5/22/2020 5/22/2020 Marie Soto MD  Bilateral delmy-orbital ecchymoses due to fall due to syncope.   • Vasovagal syncope     since childhood     Past Surgical History:   Procedure Laterality  Date   • ENDOMETRIAL BIOPSY  1983     Family History   Problem Relation Age of Onset   • Coronary artery disease Mother    • Hypertension Father    • Coronary artery disease Brother    • Hyperlipidemia Daughter    • Obesity Maternal Grandmother    • Mitral valve prolapse Daughter    • Breast cancer Neg Hx    • Ovarian cancer Neg Hx      Social History     Tobacco Use   Smoking Status Never Smoker   Smokeless Tobacco Never Used     Allergies   Allergen Reactions   • Brimonidine Other (See Comments)   • Timolol Rash       Current Outpatient Medications:   •  aspirin 81 MG tablet, Take 1 tablet by mouth Daily., Disp: 90 tablet, Rfl: 1  •  CALCIUM-VITAMIN D PO, Take  by mouth Daily. 1 chewable in the morning and after dinner., Disp: , Rfl:   •  Cholecalciferol (VITAMIN D PO), Take 1 tablet by mouth Daily., Disp: , Rfl:   •  Coenzyme Q10 (CO Q 10) 100 MG capsule, Take 1 capsule by mouth As Needed., Disp: , Rfl:   •  famotidine (PEPCID) 20 MG tablet, TAKE ONE TABLET BY MOUTH TWICE A DAY, Disp: 180 tablet, Rfl: 1  •  hydroCHLOROthiazide (HYDRODIURIL) 12.5 MG tablet, Take 1 tablet by mouth Daily., Disp: 90 tablet, Rfl: 1  •  losartan (COZAAR) 25 MG tablet, Take 1 tablet by mouth Daily., Disp: 90 tablet, Rfl: 1  •  metoprolol succinate XL (TOPROL-XL) 50 MG 24 hr tablet, Take 1 tablet by mouth 2 (Two) Times a Day., Disp: 180 tablet, Rfl: 1  •  Multiple Vitamins-Minerals (SENIOR MULTIVITAMIN PLUS PO), Take 1 tablet by mouth Daily., Disp: , Rfl:   •  omega-3 acid ethyl esters (LOVAZA) 1 g capsule, Take 2 capsules by mouth Every Night., Disp: 360 capsule, Rfl: 3  •  simvastatin (ZOCOR) 20 MG tablet, Take 1 tablet by mouth every night at bedtime., Disp: 90 tablet, Rfl: 1  •  sodium chloride (KARRIE 128) 5 % ophthalmic solution, Apply 1 drop to eye(s) as directed by provider Every Night., Disp: , Rfl:   •  timolol (TIMOPTIC-XR) 0.5 % ophthalmic gel-forming, Administer 1 drop into the left eye Daily., Disp: , Rfl:     Review of  "Systems  /80 (BP Location: Left arm, Patient Position: Sitting, Cuff Size: Adult)   Pulse 64   Temp 97.3 °F (36.3 °C) (Temporal)   Ht 157.5 cm (62.01\")   Wt 63.5 kg (140 lb)   BMI 25.60 kg/m²     Physical Exam  Vitals reviewed.   Constitutional:       Appearance: She is well-developed.   Pulmonary:      Effort: Pulmonary effort is normal.   Musculoskeletal:      Lumbar back: No tenderness. Negative right straight leg raise test and negative left straight leg raise test.   Skin:     General: Skin is warm and dry.   Neurological:      Mental Status: She is alert.   Psychiatric:         Behavior: Behavior normal.         Procedures    Results Review:  None    PHQ-9 Total Score:      Assessment/Plan:  Diagnoses and all orders for this visit:    1. Sciatica of right side (Primary)  -     Ambulatory Referral to Physical Therapy Evaluate and treat       Patient Instructions   Sciatica Rehab  Ask your health care provider which exercises are safe for you. Do exercises exactly as told by your health care provider and adjust them as directed. It is normal to feel mild stretching, pulling, tightness, or discomfort as you do these exercises. Stop right away if you feel sudden pain or your pain gets worse. Do not begin these exercises until told by your health care provider.  Stretching and range-of-motion exercises  These exercises warm up your muscles and joints and improve the movement and flexibility of your hips and back. These exercises also help to relieve pain, numbness, and tingling.  Sciatic nerve glide  1. Sit in a chair with your head facing down toward your chest. Place your hands behind your back. Let your shoulders slump forward.  2. Slowly straighten one of your legs while you tilt your head back as if you are looking toward the ceiling. Only straighten your leg as far as you can without making your symptoms worse.  3. Hold this position for __________ seconds.  4. Slowly return to the starting " position.  5. Repeat with your other leg.  Repeat __________ times. Complete this exercise __________ times a day.  Knee to chest with hip adduction and internal rotation    1. Lie on your back on a firm surface with both legs straight.  2. Bend one of your knees and move it up toward your chest until you feel a gentle stretch in your lower back and buttock. Then, move your knee toward the shoulder that is on the opposite side from your leg. This is hip adduction and internal rotation.  ? Hold your leg in this position by holding on to the front of your knee.  3. Hold this position for __________ seconds.  4. Slowly return to the starting position.  5. Repeat with your other leg.  Repeat __________ times. Complete this exercise __________ times a day.  Prone extension on elbows    1. Lie on your abdomen on a firm surface. A bed may be too soft for this exercise.  2. Prop yourself up on your elbows.  3. Use your arms to help lift your chest up until you feel a gentle stretch in your abdomen and your lower back.  ? This will place some of your body weight on your elbows. If this is uncomfortable, try stacking pillows under your chest.  ? Your hips should stay down, against the surface that you are lying on. Keep your hip and back muscles relaxed.  4. Hold this position for __________ seconds.  5. Slowly relax your upper body and return to the starting position.  Repeat __________ times. Complete this exercise __________ times a day.  Strengthening exercises  These exercises build strength and endurance in your back. Endurance is the ability to use your muscles for a long time, even after they get tired.  Pelvic tilt  This exercise strengthens the muscles that lie deep in the abdomen.  1. Lie on your back on a firm surface. Bend your knees and keep your feet flat on the floor.  2. Tense your abdominal muscles. Tip your pelvis up toward the ceiling and flatten your lower back into the floor.  ? To help with this  exercise, you may place a small towel under your lower back and try to push your back into the towel.  3. Hold this position for __________ seconds.  4. Let your muscles relax completely before you repeat this exercise.  Repeat __________ times. Complete this exercise __________ times a day.  Alternating arm and leg raises    1. Get on your hands and knees on a firm surface. If you are on a hard floor, you may want to use padding, such as an exercise mat, to cushion your knees.  2. Line up your arms and legs. Your hands should be directly below your shoulders, and your knees should be directly below your hips.  3. Lift your left leg behind you. At the same time, raise your right arm and straighten it in front of you.  ? Do not lift your leg higher than your hip.  ? Do not lift your arm higher than your shoulder.  ? Keep your abdominal and back muscles tight.  ? Keep your hips facing the ground.  ? Do not arch your back.  ? Keep your balance carefully, and do not hold your breath.  4. Hold this position for __________ seconds.  5. Slowly return to the starting position.  6. Repeat with your right leg and your left arm.  Repeat __________ times. Complete this exercise __________ times a day.  Posture and body mechanics  Good posture and healthy body mechanics can help to relieve stress in your body's tissues and joints. Body mechanics refers to the movements and positions of your body while you do your daily activities. Posture is part of body mechanics. Good posture means:  · Your spine is in its natural S-curve position (neutral).  · Your shoulders are pulled back slightly.  · Your head is not tipped forward.  Follow these guidelines to improve your posture and body mechanics in your everyday activities.  Standing    · When standing, keep your spine neutral and your feet about hip width apart. Keep a slight bend in your knees. Your ears, shoulders, and hips should line up.  · When you do a task in which you   one place for a long time, place one foot up on a stable object that is 2-4 inches (5-10 cm) high, such as a footstool. This helps keep your spine neutral.    Sitting    · When sitting, keep your spine neutral and keep your feet flat on the floor. Use a footrest, if necessary, and keep your thighs parallel to the floor. Avoid rounding your shoulders, and avoid tilting your head forward.  · When working at a desk or a computer, keep your desk at a height where your hands are slightly lower than your elbows. Slide your chair under your desk so you are close enough to maintain good posture.  · When working at a computer, place your monitor at a height where you are looking straight ahead and you do not have to tilt your head forward or downward to look at the screen.    Resting  · When lying down and resting, avoid positions that are most painful for you.  · If you have pain with activities such as sitting, bending, stooping, or squatting, lie in a position in which your body does not bend very much. For example, avoid curling up on your side with your arms and knees near your chest (fetal position).  · If you have pain with activities such as standing for a long time or reaching with your arms, lie with your spine in a neutral position and bend your knees slightly. Try the following positions:  ? Lying on your side with a pillow between your knees.  ? Lying on your back with a pillow under your knees.  Lifting    · When lifting objects, keep your feet at least shoulder width apart and tighten your abdominal muscles.  · Bend your knees and hips and keep your spine neutral. It is important to lift using the strength of your legs, not your back. Do not lock your knees straight out.  · Always ask for help to lift heavy or awkward objects.    This information is not intended to replace advice given to you by your health care provider. Make sure you discuss any questions you have with your health care provider.  Document  Revised: 04/10/2020 Document Reviewed: 01/09/2020  [employer] Patient Education © 2021 [employer]      Plan of care reviewed with patient at the conclusion of today's visit. Education was provided regarding diagnosis, management and any prescribed or recommended OTC medications.  Patient verbalizes understanding of and agreement with management plan.    Return if symptoms worsen or fail to improve.    Dictated Utilizing Dragon Dictation.    Jennifer Bernal, APRN

## 2021-11-19 NOTE — TELEPHONE ENCOUNTER
Please see if she can see [facility] today.  If not, we can go on and do a referral to PT and get that started

## 2022-01-07 DIAGNOSIS — E78.2 MIXED HYPERLIPIDEMIA: ICD-10-CM

## 2022-01-07 RX ORDER — HYDROCHLOROTHIAZIDE 12.5 MG/1
TABLET ORAL
Qty: 90 TABLET | Refills: 1 | Status: SHIPPED | OUTPATIENT
Start: 2022-01-07 | End: 2022-07-19

## 2022-01-07 RX ORDER — METOPROLOL SUCCINATE 50 MG/1
TABLET, EXTENDED RELEASE ORAL
Qty: 180 TABLET | Refills: 1 | Status: SHIPPED | OUTPATIENT
Start: 2022-01-07 | End: 2022-07-19

## 2022-01-07 RX ORDER — SIMVASTATIN 20 MG
TABLET ORAL
Qty: 90 TABLET | Refills: 1 | Status: SHIPPED | OUTPATIENT
Start: 2022-01-07 | End: 2022-07-19

## 2022-01-07 NOTE — TELEPHONE ENCOUNTER
Rx Refill Note  Requested Prescriptions     Pending Prescriptions Disp Refills   • metoprolol succinate XL (TOPROL-XL) 50 MG 24 hr tablet [Pharmacy Med Name: METOPROLOL SUCC ER 50 MG TAB] 50 tablet      Sig: TAKE ONE TABLET BY MOUTH TWICE A DAY   • simvastatin (ZOCOR) 20 MG tablet [Pharmacy Med Name: SIMVASTATIN 20 MG TABLET] 25 tablet      Sig: TAKE ONE TABLET BY MOUTH EVERY NIGHT AT BEDTIME   • hydroCHLOROthiazide (HYDRODIURIL) 12.5 MG tablet [Pharmacy Med Name: hydroCHLOROthiazide 12.5 MG TABLET] 25 tablet      Sig: TAKE ONE TABLET BY MOUTH DAILY      Last office visit with prescribing clinician: 7/28/2021      Next office visit with prescribing clinician: 1/28/2022            Sonali Donnelly LPN  01/07/22, 09:28 EST

## 2022-02-16 ENCOUNTER — OFFICE VISIT (OUTPATIENT)
Dept: INTERNAL MEDICINE | Facility: CLINIC | Age: 82
End: 2022-02-16

## 2022-02-16 ENCOUNTER — LAB (OUTPATIENT)
Dept: LAB | Facility: HOSPITAL | Age: 82
End: 2022-02-16

## 2022-02-16 VITALS
HEIGHT: 62 IN | OXYGEN SATURATION: 97 % | DIASTOLIC BLOOD PRESSURE: 78 MMHG | HEART RATE: 74 BPM | BODY MASS INDEX: 25.98 KG/M2 | RESPIRATION RATE: 16 BRPM | TEMPERATURE: 98.2 F | SYSTOLIC BLOOD PRESSURE: 130 MMHG | WEIGHT: 141.2 LBS

## 2022-02-16 DIAGNOSIS — E78.2 MIXED HYPERLIPIDEMIA: ICD-10-CM

## 2022-02-16 DIAGNOSIS — E55.9 VITAMIN D DEFICIENCY: ICD-10-CM

## 2022-02-16 DIAGNOSIS — M85.89 OSTEOPENIA OF MULTIPLE SITES: ICD-10-CM

## 2022-02-16 DIAGNOSIS — I10 BENIGN ESSENTIAL HYPERTENSION: ICD-10-CM

## 2022-02-16 DIAGNOSIS — G89.29 CHRONIC RIGHT-SIDED LOW BACK PAIN WITH RIGHT-SIDED SCIATICA: Chronic | ICD-10-CM

## 2022-02-16 DIAGNOSIS — I10 BENIGN ESSENTIAL HYPERTENSION: Primary | ICD-10-CM

## 2022-02-16 DIAGNOSIS — H40.1120 PRIMARY OPEN ANGLE GLAUCOMA OF LEFT EYE, UNSPECIFIED GLAUCOMA STAGE: Chronic | ICD-10-CM

## 2022-02-16 DIAGNOSIS — M54.41 CHRONIC RIGHT-SIDED LOW BACK PAIN WITH RIGHT-SIDED SCIATICA: Chronic | ICD-10-CM

## 2022-02-16 PROCEDURE — 99214 OFFICE O/P EST MOD 30 MIN: CPT | Performed by: INTERNAL MEDICINE

## 2022-02-16 RX ORDER — LOSARTAN POTASSIUM 25 MG/1
25 TABLET ORAL DAILY
Qty: 90 TABLET | Refills: 1 | Status: SHIPPED | OUTPATIENT
Start: 2022-02-16 | End: 2022-08-16 | Stop reason: SDUPTHER

## 2022-02-16 RX ORDER — TRAVOPROST OPHTHALMIC SOLUTION 0.04 MG/ML
SOLUTION OPHTHALMIC DAILY
COMMUNITY
Start: 2021-12-28

## 2022-02-16 NOTE — PROGRESS NOTES
[facility]     Amanda Lawson  1940   [id number]      Patient Care Team:  Marie Soto MD as PCP - General  Marie Soto MD as PCP - Family Medicine  Holly Pierce MD as Consulting Physician (Ophthalmology)  Kenneth Giles MD as Consulting Physician (Urology)    Chief Complaint   Patient presents with   • Hypertension   • Hyperlipidemia            HPI  Patient is a 81 y.o. female who presents for a follow up.    Sciatic pain.  The patient completed physical therapy for her sciatic leg pain, which she reports improved significantly. She continues performing stretching exercises at home with good relief. Of note, she notes experiencing more severe sciatic leg pain in the past and she did with this episode.      Glaucoma, left eye.  She reports that she plans to have her left cataract removed and is hopeful that will improve her vision. Additionally, she is seeing a cornea specialist now, Dr. Nguyen. She continues to follow with her ophthalmologist, Dr. Pierce. She believes that Dr. Pierce will be performing her eye surgery. She has a follow up with Dr. Pierce in 03/2022 to further discuss.     Hypertension.  She is monitoring her blood pressure at home and reports systolic pressures of 130 to 150 and diastolic pressures in the 60s. She notes that she rests prior to checking her blood pressure. Her blood pressure in office today is 130/78. She denies increased blood pressure levels being related to stress or increased activity. The patient is taking hydrochlorothiazide in the morning, losartan 25 MG and metoprolol twice daily. She is avoiding salt in her diet. The patient denies any associated side effects.     Hyperlipidemia.  She is taking simvastatin for cholesterol control. The patient denies any associated side effects. She is following a low fat diet and trying to limit her salt intake. She does not eat much meat; however, feels that she is eating an adequate  "amount of protein daily. She is exercising and walking more. The patient reports the treadmill is \"boring\" and she prefers to walk outside.    Osteopenia.  DEXA on 11/01/2021 showed a decrease in T-scores in the spine, but it is still in the osteopenia range.  T score in the femoral neck is -1.3, which is also in the mild osteopenia range.    Immunizations.  She is due for a new shingles vaccine otherwise, she is up to date on all other vaccinations.      CHRONIC CONDITIONS      Past Medical History:   Diagnosis Date   • Allergic    • Bladder cancer (HCC) 1990   • Closed fracture of nasal bone with routine healing 5/22/2020 5/22/2020 Marie Soto MD  Pain from nasal fracture has improved.  She no longer needs to take Tylenol.  There is mild residual swelling in the nasal cavity.   • Hyperlipidemia    • Hypertension    • Laceration of brow without complication 5/22/2020 5/22/2020 Marie Soto MD  Laceration of right brow due to fall due to syncope.    Stitches removed today.  Steri-Strips applied to a 3 mm area without good approximation.  The rest of the laceration is well-healed.  She was advised to stop using Neosporin and just use a little Vaseline on it daily.   • Overweight with body mass index (BMI) of 26 to 26.9 in adult 2/4/2021 2/4/2021 Marie Soto MD  Mildly overweight BMI at 26.  Continue low-fat diet.  Avoid sugars and avoid snacking.  Increase exercise and physical activity.  Weigh every Monday morning to monitor.   • Plantar fasciitis 2010   • Rosacea    • Traumatic black eye, initial encounter 5/22/2020 5/22/2020 Marie Soto MD  Bilateral delmy-orbital ecchymoses due to fall due to syncope.   • Vasovagal syncope     since childhood       Past Surgical History:   Procedure Laterality Date   • ENDOMETRIAL BIOPSY  1983       Family History   Problem Relation Age of Onset   • Coronary artery disease Mother    • Hypertension Father    • Coronary artery disease Brother    • " "Hyperlipidemia Daughter    • Obesity Maternal Grandmother    • Mitral valve prolapse Daughter    • Breast cancer Neg Hx    • Ovarian cancer Neg Hx        Social History     Socioeconomic History   • Marital status:    Tobacco Use   • Smoking status: Never Smoker   • Smokeless tobacco: Never Used   Vaping Use   • Vaping Use: Never used   Substance and Sexual Activity   • Alcohol use: Not Currently   • Drug use: No   • Sexual activity: Defer       Allergies   Allergen Reactions   • Brimonidine Other (See Comments)   • Timolol Rash       Review of Systems:     Review of Systems    Vital Signs  Vitals:    02/16/22 1522   BP: 130/78   BP Location: Left arm   Patient Position: Sitting   Cuff Size: Adult   Pulse: 74   Resp: 16   Temp: 98.2 °F (36.8 °C)   TempSrc: Infrared   SpO2: 97%   Weight: 64 kg (141 lb 3.2 oz)   Height: 157.5 cm (62\")   PainSc: 0-No pain     Body mass index is 25.83 kg/m².  Weight is normal, especially considering age.Weight is unchanged. BMI is is above average; BMI management plan is completed. We discussed low calorie, low carb based diet program, portion control, increasing exercise and joining a fitness center or start home based exercise program. .        Current Outpatient Medications:   •  aspirin 81 MG tablet, Take 1 tablet by mouth Daily., Disp: 90 tablet, Rfl: 1  •  CALCIUM-VITAMIN D PO, Take  by mouth Daily. 1 chewable in the morning and after dinner., Disp: , Rfl:   •  Cholecalciferol (VITAMIN D PO), Take 1 tablet by mouth Daily., Disp: , Rfl:   •  Coenzyme Q10 (CO Q 10) 100 MG capsule, Take 1 capsule by mouth As Needed., Disp: , Rfl:   •  famotidine (PEPCID) 20 MG tablet, TAKE ONE TABLET BY MOUTH TWICE A DAY, Disp: 180 tablet, Rfl: 1  •  hydroCHLOROthiazide (HYDRODIURIL) 12.5 MG tablet, TAKE ONE TABLET BY MOUTH DAILY, Disp: 90 tablet, Rfl: 1  •  losartan (COZAAR) 25 MG tablet, Take 1 tablet by mouth Daily., Disp: 90 tablet, Rfl: 1  •  metoprolol succinate XL (TOPROL-XL) 50 MG 24 " hr tablet, TAKE ONE TABLET BY MOUTH TWICE A DAY, Disp: 180 tablet, Rfl: 1  •  Multiple Vitamins-Minerals (SENIOR MULTIVITAMIN PLUS PO), Take 1 tablet by mouth Daily., Disp: , Rfl:   •  omega-3 acid ethyl esters (LOVAZA) 1 g capsule, Take 2 capsules by mouth Every Night., Disp: 360 capsule, Rfl: 3  •  simvastatin (ZOCOR) 20 MG tablet, TAKE ONE TABLET BY MOUTH EVERY NIGHT AT BEDTIME, Disp: 90 tablet, Rfl: 1  •  sodium chloride (KARRIE 128) 5 % ophthalmic solution, Apply 1 drop to eye(s) as directed by provider Every Night., Disp: , Rfl:   •  timolol (TIMOPTIC-XR) 0.5 % ophthalmic gel-forming, Administer 1 drop into the left eye Daily., Disp: , Rfl:   •  travoprost, BRANDYN free, (TRAVATAN) 0.004 % solution ophthalmic solution, Daily. Instill 1 drop into each eye once daily at night, Disp: , Rfl:     Physical Exam:    Physical Exam  Vitals and nursing note reviewed.   Constitutional:       Appearance: She is well-developed and normal weight.   HENT:      Head: Normocephalic.   Eyes:      Conjunctiva/sclera: Conjunctivae normal.      Pupils: Pupils are equal, round, and reactive to light.   Neck:      Thyroid: No thyromegaly.   Cardiovascular:      Rate and Rhythm: Normal rate and regular rhythm.      Heart sounds: Normal heart sounds.   Pulmonary:      Effort: Pulmonary effort is normal.      Breath sounds: Normal breath sounds.   Musculoskeletal:         General: Normal range of motion.      Cervical back: Normal range of motion and neck supple.      Right lower leg: No edema.      Left lower leg: No edema.   Lymphadenopathy:      Cervical: No cervical adenopathy.   Neurological:      Mental Status: She is alert and oriented to person, place, and time.      Gait: Gait is intact.   Psychiatric:         Attention and Perception: Attention normal.         Mood and Affect: Mood normal.         Thought Content: Thought content normal.          ACE III MINI        Results Review:    I reviewed the patient's new clinical  results.    CMP:  Lab Results   Component Value Date    BUN 18 02/16/2022    CREATININE 0.90 02/16/2022    EGFRIFNONA 60 (L) 02/16/2022    EGFRIFAFRI 73 02/16/2022    BCR 20.0 02/16/2022     02/16/2022    K 4.2 02/16/2022    CO2 31.8 (H) 02/16/2022    CALCIUM 10.3 02/16/2022    PROTENTOTREF 7.0 02/16/2022    ALBUMIN 4.80 02/16/2022    LABGLOBREF 2.2 02/16/2022    LABIL2 2.2 02/16/2022    BILITOT 0.4 02/16/2022    ALKPHOS 89 02/16/2022    AST 22 02/16/2022    ALT 26 02/16/2022     HbA1c:  No results found for: HGBA1C  Microalbumin:  Lab Results   Component Value Date    MICROALBUR 14.4 02/16/2022     Lipid Panel  Lab Results   Component Value Date    CHOL 148 02/04/2019    TRIG 194 (H) 02/16/2022    HDL 56 02/16/2022     (H) 02/16/2022    AST 22 02/16/2022    ALT 26 02/16/2022       Medication Review: Medications reviewed and noted  Patient Instructions   Problem List Items Addressed This Visit        Cardiac and Vasculature    Mixed hyperlipidemia    Current Assessment & Plan     - Continue taking simvastatin for cholesterol control.  - Encouraged daily exercise and a low fat diet.   - Fasting labs obtained prior to appointment today.          Relevant Medications    omega-3 acid ethyl esters (LOVAZA) 1 g capsule    simvastatin (ZOCOR) 20 MG tablet    Benign essential hypertension - Primary    Current Assessment & Plan     Hypertension is unchanged.  Continue current treatment regimen.  Dietary sodium restriction.  Regular aerobic exercise.  Continue current medications.  Blood pressure will be reassessed at the next regular appointment.    - Stable. Her blood pressure in office today is 130/78.   - Continue taking hydrochlorothiazide in the morning, losartan 25 MG daily and metoprolol twice daily.   - Continue monitoring blood pressure at home.   - Avoid salt in diet.          Relevant Medications    metoprolol succinate XL (TOPROL-XL) 50 MG 24 hr tablet    hydroCHLOROthiazide (HYDRODIURIL) 12.5 MG  tablet    losartan (COZAAR) 25 MG tablet       Eye    Open-angle glaucoma (Chronic)    Overview     Glaucoma and cataract surgery soon  with Dr. Pierce.         Current Assessment & Plan     - Follow up with Dr. Nguyen and Dr. Pierce as directed.         Relevant Medications    sodium chloride (KARRIE 128) 5 % ophthalmic solution    timolol (TIMOPTIC-XR) 0.5 % ophthalmic gel-forming    travoprost, BAK free, (TRAVATAN) 0.004 % solution ophthalmic solution       Musculoskeletal and Injuries    Chronic right-sided low back pain with right-sided sciatica (Chronic)    Overview     Went to [facility] physical therapy at [facility].  Symptoms improved.             Current Assessment & Plan     - Improved.   - Continue performing stretching exercises at home.   - Apply moist heat and intensify stretches if she feels any returned sciatica pain. Will repeat PT in the future as needed.          Osteopenia of multiple sites    Overview     DEXA 11/1/2021 showed a decrease in T-scores in the spine, but it is still in the osteopenia range.  T score in the femoral neck is -1.3 which is also in the mild osteopenia range.             Current Assessment & Plan     - Encouraged daily exercise, walking and use of hand weights performing curls, boxing motion, overhead and [place] arm exercises at home daily. Recommended 5 to 10 minutes with small hand weights per day. Discussed attending [employer] classes.                  Diagnosis Plan   1. Benign essential hypertension     2. Mixed hyperlipidemia     3. Chronic right-sided low back pain with right-sided sciatica     4. Primary open angle glaucoma of left eye, unspecified glaucoma stage     5. Osteopenia of multiple sites       Immunization.   - Patient was encouraged to obtain the new shingles vaccine at her local pharmacy.    The patient will follow up in 6 months for an annual physical.         Plan of care reviewed with patient at the conclusion of today's visit. Education was  provided regarding diagnosis, management, and any prescribed or recommended OTC medications.Patient verbalizes understanding of and agreement with management plan.       Transcribed from ambient dictation for Marie Soto MD by Barbara Fang.  02/17/22   15:04 EST    Patient verbalized consent to the visit recording.

## 2022-02-17 LAB
25(OH)D3+25(OH)D2 SERPL-MCNC: 83.3 NG/ML (ref 30–100)
ALBUMIN SERPL-MCNC: 4.8 G/DL (ref 3.5–5.2)
ALBUMIN/CREAT UR: 25 MG/G CREAT (ref 0–29)
ALBUMIN/GLOB SERPL: 2.2 G/DL
ALP SERPL-CCNC: 89 U/L (ref 39–117)
ALT SERPL-CCNC: 26 U/L (ref 1–33)
APPEARANCE UR: CLEAR
AST SERPL-CCNC: 22 U/L (ref 1–32)
BACTERIA #/AREA URNS HPF: ABNORMAL /HPF
BILIRUB SERPL-MCNC: 0.4 MG/DL (ref 0–1.2)
BILIRUB UR QL STRIP: NEGATIVE
BUN SERPL-MCNC: 18 MG/DL (ref 8–23)
BUN/CREAT SERPL: 20 (ref 7–25)
CALCIUM SERPL-MCNC: 10.3 MG/DL (ref 8.6–10.5)
CASTS URNS MICRO: ABNORMAL
CHLORIDE SERPL-SCNC: 97 MMOL/L (ref 98–107)
CHOLEST SERPL-MCNC: 196 MG/DL (ref 0–200)
CO2 SERPL-SCNC: 31.8 MMOL/L (ref 22–29)
COLOR UR: YELLOW
CREAT SERPL-MCNC: 0.9 MG/DL (ref 0.57–1)
CREAT UR-MCNC: 56.8 MG/DL
EPI CELLS #/AREA URNS HPF: ABNORMAL /HPF
GLOBULIN SER CALC-MCNC: 2.2 GM/DL
GLUCOSE SERPL-MCNC: 94 MG/DL (ref 65–99)
GLUCOSE UR QL STRIP: NEGATIVE
HDLC SERPL-MCNC: 56 MG/DL (ref 40–60)
HGB UR QL STRIP: NEGATIVE
KETONES UR QL STRIP: NEGATIVE
LDLC SERPL CALC-MCNC: 107 MG/DL (ref 0–100)
LEUKOCYTE ESTERASE UR QL STRIP: ABNORMAL
MICROALBUMIN UR-MCNC: 14.4 UG/ML
NITRITE UR QL STRIP: NEGATIVE
PH UR STRIP: 7 [PH] (ref 5–8)
POTASSIUM SERPL-SCNC: 4.2 MMOL/L (ref 3.5–5.2)
PROT SERPL-MCNC: 7 G/DL (ref 6–8.5)
PROT UR QL STRIP: NEGATIVE
RBC #/AREA URNS HPF: ABNORMAL /HPF
SODIUM SERPL-SCNC: 138 MMOL/L (ref 136–145)
SP GR UR STRIP: 1.01 (ref 1–1.03)
TRIGL SERPL-MCNC: 194 MG/DL (ref 0–150)
UROBILINOGEN UR STRIP-MCNC: ABNORMAL MG/DL
VLDLC SERPL CALC-MCNC: 33 MG/DL (ref 5–40)
WBC #/AREA URNS HPF: ABNORMAL /HPF

## 2022-02-17 NOTE — ASSESSMENT & PLAN NOTE
- Improved.   - Continue performing stretching exercises at home.   - Apply moist heat and intensify stretches if she feels any returned sciatica pain. Will repeat PT in the future as needed.

## 2022-02-17 NOTE — ASSESSMENT & PLAN NOTE
- Encouraged daily exercise, walking and use of hand weights performing curls, boxing motion, overhead and [place] arm exercises at home daily. Recommended 5 to 10 minutes with small hand weights per day. Discussed attending [employer] sne[employer] classes.

## 2022-02-17 NOTE — ASSESSMENT & PLAN NOTE
Hypertension is unchanged.  Continue current treatment regimen.  Dietary sodium restriction.  Regular aerobic exercise.  Continue current medications.  Blood pressure will be reassessed at the next regular appointment.    - Stable. Her blood pressure in office today is 130/78.   - Continue taking hydrochlorothiazide in the morning, losartan 25 MG daily and metoprolol twice daily.   - Continue monitoring blood pressure at home.   - Avoid salt in diet.

## 2022-02-17 NOTE — ASSESSMENT & PLAN NOTE
- Continue taking simvastatin for cholesterol control.  - Encouraged daily exercise and a low fat diet.   - Fasting labs obtained prior to appointment today.

## 2022-02-20 NOTE — PATIENT INSTRUCTIONS
Patient Instructions   Problem List Items Addressed This Visit        Cardiac and Vasculature    Mixed hyperlipidemia    Current Assessment & Plan     - Continue taking simvastatin for cholesterol control.  - Encouraged daily exercise and a low fat diet.   - Fasting labs obtained prior to appointment today.          Relevant Medications    omega-3 acid ethyl esters (LOVAZA) 1 g capsule    simvastatin (ZOCOR) 20 MG tablet    Benign essential hypertension - Primary    Current Assessment & Plan     Hypertension is unchanged.  Continue current treatment regimen.  Dietary sodium restriction.  Regular aerobic exercise.  Continue current medications.  Blood pressure will be reassessed at the next regular appointment.    - Stable. Her blood pressure in office today is 130/78.   - Continue taking hydrochlorothiazide in the morning, losartan 25 MG daily and metoprolol twice daily.   - Continue monitoring blood pressure at home.   - Avoid salt in diet.          Relevant Medications    metoprolol succinate XL (TOPROL-XL) 50 MG 24 hr tablet    hydroCHLOROthiazide (HYDRODIURIL) 12.5 MG tablet    losartan (COZAAR) 25 MG tablet       Eye    Open-angle glaucoma (Chronic)    Overview     Glaucoma and cataract surgery soon  with Dr. Pierce.         Current Assessment & Plan     - Follow up with Dr. Nguyen and Dr. Pierce as directed.         Relevant Medications    sodium chloride (KARRIE 128) 5 % ophthalmic solution    timolol (TIMOPTIC-XR) 0.5 % ophthalmic gel-forming    travoprost, BRANDYN free, (TRAVATAN) 0.004 % solution ophthalmic solution       Musculoskeletal and Injuries    Chronic right-sided low back pain with right-sided sciatica (Chronic)    Overview     [facility] at [facility].  Symptoms improved.             Current Assessment & Plan     - Improved.   - Continue performing stretching exercises at home.   - Apply moist heat and intensify stretches if she feels any returned sciatica pain. Will repeat PT in  the future as needed.          Osteopenia of multiple sites    Overview     DEXA 11/1/2021 showed a decrease in T-scores in the spine, but it is still in the osteopenia range.  T score in the femoral neck is -1.3 which is also in the mild osteopenia range.             Current Assessment & Plan     - Encouraged daily exercise, walking and use of hand weights performing curls, boxing motion, overhead and [place] arm exercises at home daily. Recommended 5 to 10 minutes with small hand weights per day. Discussed attending [employer] snadrianaker classes.                  Fall Prevention in the Home, Adult  Falls can cause injuries and can affect people from all age groups. There are many simple things that you can do to make your home safe and to help prevent falls. Ask for help when making these changes, if needed.  What actions can I take to prevent falls?  General instructions  · Use good lighting in all rooms. Replace any light bulbs that burn out.  · Turn on lights if it is dark. Use night-lights.  · Place frequently used items in easy-to-reach places. Lower the shelves around your home if necessary.  · Set up furniture so that there are clear paths around it. Avoid moving your furniture around.  · Remove throw rugs and other tripping hazards from the floor.  · Avoid walking on wet floors.  · Fix any uneven floor surfaces.  · Add color or contrast paint or tape to grab bars and handrails in your home. Place contrasting color strips on the first and last steps of stairways.  · When you use a stepladder, make sure that it is completely opened and that the sides are firmly locked. Have someone hold the ladder while you are using it. Do not climb a closed stepladder.  · Be aware of any and all pets.  What can I do in the bathroom?         · Keep the floor dry. Immediately clean up any water that spills onto the floor.  · Remove soap buildup in the tub or shower on a regular basis.  · Use non-skid mats or decals on the floor of  the tub or shower.  · Attach bath mats securely with double-sided, non-slip rug tape.  · If you need to sit down while you are in the shower, use a plastic, non-slip stool.  · Install grab bars by the toilet and in the tub and shower. Do not use towel bars as grab bars.  What can I do in the bedroom?  · Make sure that a bedside light is easy to reach.  · Do not use oversized bedding that drapes onto the floor.  · Have a firm chair that has side arms to use for getting dressed.  What can I do in the kitchen?  · Clean up any spills right away.  · If you need to reach for something above you, use a sturdy step stool that has a grab bar.  · Keep electrical cables out of the way.  · Do not use floor polish or wax that makes floors slippery. If you must use wax, make sure that it is non-skid floor wax.  What can I do in the stairways?  · Do not leave any items on the stairs.  · Make sure that you have a light switch at the top of the stairs and the bottom of the stairs. Have them installed if you do not have them.  · Make sure that there are handrails on both sides of the stairs. Fix handrails that are broken or loose. Make sure that handrails are as long as the stairways.  · Install non-slip stair treads on all stairs in your home.  · Avoid having throw rugs at the top or bottom of stairways, or secure the rugs with carpet tape to prevent them from moving.  · Choose a carpet design that does not hide the edge of steps on the stairway.  · Check any carpeting to make sure that it is firmly attached to the stairs. Fix any carpet that is loose or worn.  What can I do on the outside of my home?  · Use bright outdoor lighting.  · Regularly repair the edges of walkways and driveways and fix any cracks.  · Remove high doorway thresholds.  · Trim any shrubbery on the main path into your home.  · Regularly check that handrails are securely fastened and in good repair. Both sides of any steps should have handrails.  · Install  guardrails along the edges of any raised decks or porches.  · Clear walkways of debris and clutter, including tools and rocks.  · Have leaves, snow, and ice cleared regularly.  · Use sand or salt on walkways during winter months.  · In the garage, clean up any spills right away, including grease or oil spills.  What other actions can I take?  · Wear closed-toe shoes that fit well and support your feet. Wear shoes that have rubber soles or low heels.  · Use mobility aids as needed, such as canes, walkers, scooters, and crutches.  · Review your medicines with your health care provider. Some medicines can cause dizziness or changes in blood pressure, which increase your risk of falling.  Talk with your health care provider about other ways that you can decrease your risk of falls. This may include working with a physical therapist or  to improve your strength, balance, and endurance.  Where to find more information  · Centers for Disease Control and Prevention, STEAD[place]: https://www.cdc.gov  · National [place] [employer] Aging: https://ls4kxcn.rajeev.nih.gov  Contact a health care provider if:  · You are afraid of falling at home.  · You feel weak, drowsy, or dizzy at home.  · You fall at home.  Summary  · There are many simple things that you can do to make your home safe and to help prevent falls.  · Ways to make your home safe include removing tripping hazards and installing grab bars in the bathroom.  · Ask for help when making these changes in your home.  This information is not intended to replace advice given to you by your health care provider. Make sure you discuss any questions you have with your health care provider.  Document Revised: 11/30/2018 Document Reviewed: 08/02/2018  Elsevier Patient Education © 2021 [employer]

## 2022-05-09 ENCOUNTER — HOSPITAL ENCOUNTER (OUTPATIENT)
Dept: MAMMOGRAPHY | Facility: HOSPITAL | Age: 82
Discharge: HOME | End: 2022-05-09
Admitting: INTERNAL MEDICINE

## 2022-05-09 DIAGNOSIS — R92.8 ABNORMAL MAMMOGRAM: ICD-10-CM

## 2022-05-09 PROCEDURE — G0279 TOMOSYNTHESIS, MAMMO: HCPCS | Performed by: RADIOLOGY

## 2022-05-09 PROCEDURE — G0279 TOMOSYNTHESIS, MAMMO: HCPCS

## 2022-05-09 PROCEDURE — 77065 DX MAMMO INCL CAD UNI: CPT | Performed by: RADIOLOGY

## 2022-05-09 PROCEDURE — 77065 DX MAMMO INCL CAD UNI: CPT

## 2022-05-13 ENCOUNTER — TRANSCRIBE ORDERS (OUTPATIENT)
Dept: ADMINISTRATIVE | Facility: HOSPITAL | Age: 82
End: 2022-05-13

## 2022-05-13 DIAGNOSIS — Z12.31 VISIT FOR SCREENING MAMMOGRAM: Primary | ICD-10-CM

## 2022-07-11 RX ORDER — OMEGA-3-ACID ETHYL ESTERS 1 G/1
2 CAPSULE, LIQUID FILLED ORAL NIGHTLY
Qty: 180 CAPSULE | Refills: 1 | Status: SHIPPED | OUTPATIENT
Start: 2022-07-11 | End: 2023-03-13 | Stop reason: SDUPTHER

## 2022-07-19 DIAGNOSIS — E78.2 MIXED HYPERLIPIDEMIA: ICD-10-CM

## 2022-07-19 RX ORDER — METOPROLOL SUCCINATE 50 MG/1
TABLET, EXTENDED RELEASE ORAL
Qty: 180 TABLET | Refills: 1 | Status: SHIPPED | OUTPATIENT
Start: 2022-07-19 | End: 2023-01-16 | Stop reason: SDUPTHER

## 2022-07-19 RX ORDER — SIMVASTATIN 20 MG
TABLET ORAL
Qty: 90 TABLET | Refills: 1 | Status: SHIPPED | OUTPATIENT
Start: 2022-07-19 | End: 2022-08-16 | Stop reason: SDUPTHER

## 2022-07-19 RX ORDER — HYDROCHLOROTHIAZIDE 12.5 MG/1
TABLET ORAL
Qty: 90 TABLET | Refills: 1 | Status: SHIPPED | OUTPATIENT
Start: 2022-07-19 | End: 2023-01-16 | Stop reason: SDUPTHER

## 2022-08-12 ENCOUNTER — LAB (OUTPATIENT)
Dept: LAB | Facility: HOSPITAL | Age: 82
End: 2022-08-12

## 2022-08-12 DIAGNOSIS — E78.2 MIXED HYPERLIPIDEMIA: ICD-10-CM

## 2022-08-12 DIAGNOSIS — R53.83 OTHER FATIGUE: ICD-10-CM

## 2022-08-12 DIAGNOSIS — E55.9 VITAMIN D DEFICIENCY: ICD-10-CM

## 2022-08-12 DIAGNOSIS — I10 BENIGN ESSENTIAL HYPERTENSION: ICD-10-CM

## 2022-08-13 LAB
25(OH)D3+25(OH)D2 SERPL-MCNC: 65.1 NG/ML (ref 30–100)
ALBUMIN SERPL-MCNC: 4.9 G/DL (ref 3.5–5.2)
ALBUMIN/CREAT UR: 51 MG/G CREAT (ref 0–29)
ALBUMIN/GLOB SERPL: 2.9 G/DL
ALP SERPL-CCNC: 74 U/L (ref 39–117)
ALT SERPL-CCNC: 21 U/L (ref 1–33)
APPEARANCE UR: CLEAR
AST SERPL-CCNC: 21 U/L (ref 1–32)
BACTERIA #/AREA URNS HPF: ABNORMAL /HPF
BASOPHILS # BLD AUTO: 0.03 10*3/MM3 (ref 0–0.2)
BASOPHILS NFR BLD AUTO: 0.4 % (ref 0–1.5)
BILIRUB SERPL-MCNC: 0.5 MG/DL (ref 0–1.2)
BILIRUB UR QL STRIP: NEGATIVE
BUN SERPL-MCNC: 15 MG/DL (ref 8–23)
BUN/CREAT SERPL: 17 (ref 7–25)
CALCIUM SERPL-MCNC: 9.8 MG/DL (ref 8.6–10.5)
CASTS URNS MICRO: ABNORMAL
CHLORIDE SERPL-SCNC: 95 MMOL/L (ref 98–107)
CHOLEST SERPL-MCNC: 175 MG/DL (ref 0–200)
CO2 SERPL-SCNC: 29 MMOL/L (ref 22–29)
COLOR UR: YELLOW
CREAT SERPL-MCNC: 0.88 MG/DL (ref 0.57–1)
CREAT UR-MCNC: 64.7 MG/DL
EGFRCR SERPLBLD CKD-EPI 2021: 66.1 ML/MIN/1.73
EOSINOPHIL # BLD AUTO: 0.07 10*3/MM3 (ref 0–0.4)
EOSINOPHIL NFR BLD AUTO: 1 % (ref 0.3–6.2)
EPI CELLS #/AREA URNS HPF: ABNORMAL /HPF
ERYTHROCYTE [DISTWIDTH] IN BLOOD BY AUTOMATED COUNT: 12.1 % (ref 12.3–15.4)
GLOBULIN SER CALC-MCNC: 1.7 GM/DL
GLUCOSE SERPL-MCNC: 101 MG/DL (ref 65–99)
GLUCOSE UR QL STRIP: NEGATIVE
HCT VFR BLD AUTO: 42.2 % (ref 34–46.6)
HDLC SERPL-MCNC: 56 MG/DL (ref 40–60)
HGB BLD-MCNC: 14.5 G/DL (ref 12–15.9)
HGB UR QL STRIP: ABNORMAL
IMM GRANULOCYTES # BLD AUTO: 0.02 10*3/MM3 (ref 0–0.05)
IMM GRANULOCYTES NFR BLD AUTO: 0.3 % (ref 0–0.5)
KETONES UR QL STRIP: NEGATIVE
LDLC SERPL CALC-MCNC: 91 MG/DL (ref 0–100)
LEUKOCYTE ESTERASE UR QL STRIP: ABNORMAL
LYMPHOCYTES # BLD AUTO: 2.04 10*3/MM3 (ref 0.7–3.1)
LYMPHOCYTES NFR BLD AUTO: 30 % (ref 19.6–45.3)
MCH RBC QN AUTO: 30.3 PG (ref 26.6–33)
MCHC RBC AUTO-ENTMCNC: 34.4 G/DL (ref 31.5–35.7)
MCV RBC AUTO: 88.3 FL (ref 79–97)
MICROALBUMIN UR-MCNC: 33 UG/ML
MONOCYTES # BLD AUTO: 0.64 10*3/MM3 (ref 0.1–0.9)
MONOCYTES NFR BLD AUTO: 9.4 % (ref 5–12)
NEUTROPHILS # BLD AUTO: 4 10*3/MM3 (ref 1.7–7)
NEUTROPHILS NFR BLD AUTO: 58.9 % (ref 42.7–76)
NITRITE UR QL STRIP: NEGATIVE
NRBC BLD AUTO-RTO: 0 /100 WBC (ref 0–0.2)
PH UR STRIP: 7.5 [PH] (ref 5–8)
PLATELET # BLD AUTO: 170 10*3/MM3 (ref 140–450)
POTASSIUM SERPL-SCNC: 4.1 MMOL/L (ref 3.5–5.2)
PROT SERPL-MCNC: 6.6 G/DL (ref 6–8.5)
PROT UR QL STRIP: NEGATIVE
RBC # BLD AUTO: 4.78 10*6/MM3 (ref 3.77–5.28)
RBC #/AREA URNS HPF: ABNORMAL /HPF
SODIUM SERPL-SCNC: 137 MMOL/L (ref 136–145)
SP GR UR STRIP: 1.01 (ref 1–1.03)
TRIGL SERPL-MCNC: 162 MG/DL (ref 0–150)
TSH SERPL DL<=0.005 MIU/L-ACNC: 3.09 UIU/ML (ref 0.27–4.2)
UROBILINOGEN UR STRIP-MCNC: ABNORMAL MG/DL
VIT B12 SERPL-MCNC: 1113 PG/ML (ref 211–946)
VLDLC SERPL CALC-MCNC: 28 MG/DL (ref 5–40)
WBC # BLD AUTO: 6.8 10*3/MM3 (ref 3.4–10.8)
WBC #/AREA URNS HPF: ABNORMAL /HPF

## 2022-08-13 RX ORDER — NITROFURANTOIN 25; 75 MG/1; MG/1
100 CAPSULE ORAL 2 TIMES DAILY
Qty: 10 CAPSULE | Refills: 0 | Status: SHIPPED | OUTPATIENT
Start: 2022-08-13 | End: 2022-08-18

## 2022-08-16 ENCOUNTER — OFFICE VISIT (OUTPATIENT)
Dept: INTERNAL MEDICINE | Facility: CLINIC | Age: 82
End: 2022-08-16

## 2022-08-16 VITALS
TEMPERATURE: 97.3 F | OXYGEN SATURATION: 95 % | WEIGHT: 137.8 LBS | HEIGHT: 62 IN | SYSTOLIC BLOOD PRESSURE: 142 MMHG | DIASTOLIC BLOOD PRESSURE: 82 MMHG | HEART RATE: 69 BPM | BODY MASS INDEX: 25.36 KG/M2

## 2022-08-16 DIAGNOSIS — N30.00 ACUTE CYSTITIS WITHOUT HEMATURIA: Chronic | ICD-10-CM

## 2022-08-16 DIAGNOSIS — E78.2 MIXED HYPERLIPIDEMIA: ICD-10-CM

## 2022-08-16 DIAGNOSIS — Z00.00 MEDICARE ANNUAL WELLNESS VISIT, SUBSEQUENT: Primary | ICD-10-CM

## 2022-08-16 DIAGNOSIS — N81.4 PROLAPSED UTERUS: Chronic | ICD-10-CM

## 2022-08-16 DIAGNOSIS — Z85.51 PERSONAL HISTORY OF BLADDER CANCER: ICD-10-CM

## 2022-08-16 DIAGNOSIS — I70.0 ATHEROSCLEROSIS OF ABDOMINAL AORTA: ICD-10-CM

## 2022-08-16 DIAGNOSIS — I10 BENIGN ESSENTIAL HYPERTENSION: ICD-10-CM

## 2022-08-16 DIAGNOSIS — M85.89 OSTEOPENIA OF MULTIPLE SITES: ICD-10-CM

## 2022-08-16 DIAGNOSIS — Z00.00 ANNUAL PHYSICAL EXAM: ICD-10-CM

## 2022-08-16 DIAGNOSIS — K21.9 GERD WITHOUT ESOPHAGITIS: ICD-10-CM

## 2022-08-16 DIAGNOSIS — E55.9 VITAMIN D DEFICIENCY: ICD-10-CM

## 2022-08-16 DIAGNOSIS — R53.83 OTHER FATIGUE: ICD-10-CM

## 2022-08-16 PROCEDURE — G0439 PPPS, SUBSEQ VISIT: HCPCS | Performed by: INTERNAL MEDICINE

## 2022-08-16 PROCEDURE — 1159F MED LIST DOCD IN RCRD: CPT | Performed by: INTERNAL MEDICINE

## 2022-08-16 PROCEDURE — 99397 PER PM REEVAL EST PAT 65+ YR: CPT | Performed by: INTERNAL MEDICINE

## 2022-08-16 PROCEDURE — 1170F FXNL STATUS ASSESSED: CPT | Performed by: INTERNAL MEDICINE

## 2022-08-16 PROCEDURE — 96160 PT-FOCUSED HLTH RISK ASSMT: CPT | Performed by: INTERNAL MEDICINE

## 2022-08-16 RX ORDER — LOSARTAN POTASSIUM 25 MG/1
25 TABLET ORAL DAILY
Qty: 90 TABLET | Refills: 1 | Status: SHIPPED | OUTPATIENT
Start: 2022-08-16 | End: 2023-03-02 | Stop reason: SDUPTHER

## 2022-08-16 RX ORDER — SIMVASTATIN 20 MG
20 TABLET ORAL
Qty: 90 TABLET | Refills: 1 | Status: SHIPPED | OUTPATIENT
Start: 2022-08-16 | End: 2023-02-15 | Stop reason: SDUPTHER

## 2022-08-16 RX ORDER — FAMOTIDINE 20 MG/1
20 TABLET, FILM COATED ORAL 2 TIMES DAILY
Qty: 180 TABLET | Refills: 1 | Status: SHIPPED | OUTPATIENT
Start: 2022-08-16

## 2022-08-16 NOTE — PROGRESS NOTES
The ABCs of the Annual Wellness Visit  Initial Medicare Wellness Visit    Chief Complaint   Patient presents with   • Medicare Wellness-subsequent   • Hypertension       Subjective   History of Present Illness:  Amnada Lawson is a 82 y.o. female who presents for an Initial Medicare Wellness Visit.     Annual wellness visit  The patient denies any recent falls. She denies feeling down or depressed. The patient has been very careful when she works in the yard.     Status post cataract surgery, left eye  The patient had cataract surgery on her left eye on 07/25/2022. She states her vision has not improved. The patient has cornea disease, and when they do surgery on her eye, it adversely affects the cornea. She reports it takes a while to calm down. The patient has a follow up appointment on 08/30/2022. She is hopeful for any tiny improvement.     UTI  The patient was surprised that she had a UTI. She denies any dysuria or frequency. The patient is on an antibiotic. She has not had to see the urologist since last year. The patient needs to have another cystoscopy. She reports the COVID-19 pandemic got her off schedule. The patient will call and schedule an appointment for 11/2022.     Prolapsed uterus  She has a prolapsed uterus. The patient does not want to have a hysterectomy. She has a problem completely emptying her bladder.     Vitamin D deficiency   The patient continued the vitamin D this summer. She states that she walks for exercise.    Hypertension  The patient states her blood pressure varies. She reports her systolic number is usually 135 to 140 mmHg, and sometimes it will be 118/70 mmHg or so. The patient is taking hydrochlorothiazide, losartan 25 mg tablet, and metoprolol twice per day. She believes her eye drops, which are beta blockers, probably help lower her blood pressure.     Hyperlipidemia  The patient is taking simvastatin in the evening. She denies any side effects. The patient is taking  Lovaza 2 tablets twice per day. Total cholesterol was very good at 175 mg/dL. Her LDL came down from 107 to 91 mg/dL. Triglycerides improved from 194 to 162 mg/dL. She does not need any more labs, we did them last week.    GERD  The patient needs a refill on the famotidine. She reports it helps some. The patient tries to take it before lunch and before the evening meal. She sometimes will go on and take it before breakfast. The patient has some colon pain occasionally, which seems to be diet related. She states her stools have been regular.    Immunizations  The patient is up to date on everything. She plans to get the Shingrix vaccine in 09/2022. The patient has had 2 diagnostic mammograms. She was told she should not have a vaccine right before a mammogram. The patient will get the influenza vaccine in 09/2022 as well.    CHRONIC CONDITIONS:    HEALTH RISK ASSESSMENT    Recent Hospitalizations:  No hospitalization(s) within the last year.    Current Medical Providers:  Patient Care Team:  Marie Soto MD as PCP - General  Marie Soto MD as PCP - Family Medicine  Holly Pierce MD as Consulting Physician (Ophthalmology)  Kenneth Giles MD as Consulting Physician (Urology)    Smoking Status:  Social History     Tobacco Use   Smoking Status Never Smoker   Smokeless Tobacco Never Used       Alcohol Consumption:  Social History     Substance and Sexual Activity   Alcohol Use Not Currently       Depression Screen:   PHQ-2/PHQ-9 Depression Screening 8/16/2022   Retired PHQ-9 Total Score -   Retired Total Score -   Little Interest or Pleasure in Doing Things 0-->not at all   Feeling Down, Depressed or Hopeless 0-->not at all   PHQ-9: Brief Depression Severity Measure Score 0       Fall Risk Screen:  STEADI Fall Risk Assessment was completed, and patient is at LOW risk for falls.Assessment completed on:8/16/2022    Health Habits and Functional and Cognitive Screening:  Functional & Cognitive  Status 8/16/2022   Do you have difficulty preparing food and eating? No   Do you have difficulty bathing yourself, getting dressed or grooming yourself? No   Do you have difficulty using the toilet? No   Do you have difficulty moving around from place to place? No   Do you have trouble with steps or getting out of a bed or a chair? No   Current Diet Well Balanced Diet   Dental Exam Up to date   Eye Exam Up to date   Exercise (times per week) 5 times per week   Current Exercises Include Walking   Current Exercise Activities Include -   Do you need help using the phone?  No   Are you deaf or do you have serious difficulty hearing?  Yes   Do you need help with transportation? No   Do you need help shopping? No   Do you need help preparing meals?  No   Do you need help with housework?  No   Do you need help with laundry? No   Do you need help taking your medications? No   Do you need help managing money? No   Do you ever drive or ride in a car without wearing a seat belt? No   Have you felt unusual stress, anger or loneliness in the last month? No   Who do you live with? Alone   If you need help, do you have trouble finding someone available to you? No   Have you been bothered in the last four weeks by sexual problems? No   Do you have difficulty concentrating, remembering or making decisions? Yes       Does the patient have evidence of cognitive impairment? No    Asprin use counseling:Taking ASA appropriately as indicated    Age-appropriate Screening Schedule:  Refer to the list below for future screening recommendations based on patient's age, sex and/or medical conditions. Orders for these recommended tests are listed in the plan section. The patient has been provided with a written plan.    Health Maintenance   Topic Date Due   • ZOSTER VACCINE (2 of 3) 11/26/2010   • HEMOGLOBIN A1C  Never done   • INFLUENZA VACCINE  10/01/2022   • LIPID PANEL  08/12/2023   • URINE MICROALBUMIN  08/12/2023   • DXA SCAN  10/29/2023    • TDAP/TD VACCINES (3 - Td or Tdap) 05/14/2030   • DIABETIC EYE EXAM  Discontinued        The following portions of the patient's history were reviewed and updated as appropriate: allergies, current medications, past family history, past medical history, past social history, past surgical history and problem list.    Outpatient Medications Prior to Visit   Medication Sig Dispense Refill   • aspirin 81 MG tablet Take 1 tablet by mouth Daily. 90 tablet 1   • CALCIUM-VITAMIN D PO Take  by mouth Daily. 1 chewable in the morning and after dinner.     • Cholecalciferol (VITAMIN D PO) Take 1 tablet by mouth Daily.     • Coenzyme Q10 (CO Q 10) 100 MG capsule Take 1 capsule by mouth As Needed.     • hydroCHLOROthiazide (HYDRODIURIL) 12.5 MG tablet TAKE ONE TABLET BY MOUTH DAILY 90 tablet 1   • metoprolol succinate XL (TOPROL-XL) 50 MG 24 hr tablet TAKE ONE TABLET BY MOUTH TWICE A  tablet 1   • Multiple Vitamins-Minerals (SENIOR MULTIVITAMIN PLUS PO) Take 1 tablet by mouth Daily.     • nitrofurantoin, macrocrystal-monohydrate, (Macrobid) 100 MG capsule Take 1 capsule by mouth 2 (Two) Times a Day for 5 days. 10 capsule 0   • omega-3 acid ethyl esters (LOVAZA) 1 g capsule Take 2 capsules by mouth Every Night. 180 capsule 1   • sodium chloride (KARRIE 128) 5 % ophthalmic solution Apply 1 drop to eye(s) as directed by provider Every Night.     • timolol (TIMOPTIC-XR) 0.5 % ophthalmic gel-forming Administer 1 drop into the left eye Daily.     • travoprost, BAK free, (TRAVATAN) 0.004 % solution ophthalmic solution Daily. Instill 1 drop into each eye once daily at night     • famotidine (PEPCID) 20 MG tablet TAKE ONE TABLET BY MOUTH TWICE A  tablet 1   • losartan (COZAAR) 25 MG tablet Take 1 tablet by mouth Daily. 90 tablet 1   • simvastatin (ZOCOR) 20 MG tablet TAKE ONE TABLET BY MOUTH EVERY NIGHT AT BEDTIME 90 tablet 1     No facility-administered medications prior to visit.       Patient Active Problem List  "  Diagnosis   • Vitamin D deficiency   • Personal history of bladder cancer   • Benign essential hypertension   • Allergic rhinitis   • Open-angle glaucoma   • Osteopenia of multiple sites   • Pain of right hip joint   • Risk for falls   • Mixed hyperlipidemia   • Bunion   • Atherosclerosis of abdominal aorta (HCC)   • GERD without esophagitis   • Prolapsed uterus   • Stress incontinence of urine   • Annual physical exam   • Need for vaccination   • Chronic right-sided low back pain with right-sided sciatica   • Other fatigue   • Medicare annual wellness visit, subsequent   • Acute cystitis without hematuria       Advanced Care Planning:  ACP discussion was held with the patient during this visit. Patient has an advance directive (not in EMR), copy requested.    Review of Systems    Compared to one year ago, the patient feels her physical health is the same.  Compared to one year ago, the patient feels her mental health is the same.    Reviewed chart for potential of high risk medication in the elderly: yes  Reviewed chart for potential of harmful drug interactions in the elderly:yes    Objective         Vitals:    08/16/22 0959   BP: 142/82   BP Location: Left arm   Patient Position: Sitting   Cuff Size: Adult   Pulse: 69   Temp: 97.3 °F (36.3 °C)   TempSrc: Infrared   SpO2: 95%   Weight: 62.5 kg (137 lb 12.8 oz)   Height: 157.5 cm (62\")   PainSc: 0-No pain     BMI is >= 25 and <30. (Overweight) The following options were offered after discussion;: exercise counseling/recommendations and nutrition counseling/recommendations    Body mass index is 25.2 kg/m².  Discussed the patient's BMI with her. The BMI is in the acceptable range.    Physical Exam  Vitals and nursing note reviewed.   Constitutional:       Appearance: She is well-developed.   HENT:      Head: Normocephalic.      Ears:      Comments:  Decreased hearing bilateral ears.  Eyes:      Conjunctiva/sclera: Conjunctivae normal.      Pupils: Pupils are equal, " round, and reactive to light.   Neck:      Thyroid: No thyromegaly.   Cardiovascular:      Rate and Rhythm: Normal rate and regular rhythm.      Heart sounds: Normal heart sounds.   Pulmonary:      Effort: Pulmonary effort is normal.      Breath sounds: Normal breath sounds. No wheezing.   Chest:   Breasts:      Right: No inverted nipple, mass, nipple discharge, skin change or tenderness.      Left: No inverted nipple, mass, nipple discharge, skin change or tenderness.       Abdominal:      General: Bowel sounds are normal.      Palpations: Abdomen is soft.      Tenderness: There is no abdominal tenderness.   Musculoskeletal:         General: No tenderness. Normal range of motion.      Cervical back: Normal range of motion and neck supple.   Lymphadenopathy:      Cervical: No cervical adenopathy.   Skin:     General: Skin is warm and dry.      Findings: No rash.   Neurological:      Mental Status: She is alert and oriented to person, place, and time.      Cranial Nerves: No cranial nerve deficit.      Sensory: No sensory deficit.      Coordination: Coordination normal.      Gait: Gait normal.   Psychiatric:         Speech: Speech normal.         Behavior: Behavior normal.         Thought Content: Thought content normal.         Judgment: Judgment normal.         Lab Results   Component Value Date    CHLPL 175 08/12/2022    TRIG 162 (H) 08/12/2022    HDL 56 08/12/2022    LDL 91 08/12/2022    VLDL 28 08/12/2022   Her most recent labs show her blood counts are all good.  On the urinalysis there was a large amount of white blood cells.   B12 level is good. Vitamin D is good at 65. Thyroid panel is good.  Blood glucose was 101 mg/dL. Electrolytes were good. Kidney function and liver enzymes are very good.     Assessment & Plan   Medicare Risks and Personalized Health Plan  CMS Preventative Services Quick Reference  Cardiovascular risk  Osteoporosis Risk    The above risks/problems have been discussed with the  patient.  Pertinent information has been shared with the patient in the After Visit Summary.  Follow up plans and orders are seen below in the Assessment/Plan Section.  Patient Instructions   Problem List Items Addressed This Visit        Cardiac and Vasculature    Benign essential hypertension    Overview     Taking losartan and hydrochlorothiazide.          Current Assessment & Plan     - Continue hydrochlorothiazide and losartan. Continue to avoid salt in the diet.         Relevant Medications    hydroCHLOROthiazide (HYDRODIURIL) 12.5 MG tablet    metoprolol succinate XL (TOPROL-XL) 50 MG 24 hr tablet    losartan (COZAAR) 25 MG tablet    Other Relevant Orders    Comprehensive Metabolic Panel (Completed)    CBC & Differential (Completed)    Microalbumin / Creatinine Urine Ratio - Urine, Clean Catch (Completed)    Urinalysis With Microscopic - Urine, Clean Catch (Completed)    Mixed hyperlipidemia    Overview     Taking simvastatin .         Current Assessment & Plan     - Continue simvastatin every evening. Continue low-fat diet and regular exercise.         Relevant Medications    omega-3 acid ethyl esters (LOVAZA) 1 g capsule    simvastatin (ZOCOR) 20 MG tablet    Other Relevant Orders    Lipid Panel (Completed)    TSH (Completed)    Atherosclerosis of abdominal aorta (HCC)    Overview     Taking daily coated 81mg aspirin.         Current Assessment & Plan     - She will continue taking daily coated 81 mg aspirin and continue good blood pressure control.         Relevant Medications    aspirin 81 MG tablet       Endocrine and Metabolic    Vitamin D deficiency    Overview     Taking vitamin D3 and calcium.         Current Assessment & Plan     - She may continue current dose of calcium and vitamin D3.         Relevant Orders    Vitamin D 25 Hydroxy (Completed)       Gastrointestinal Abdominal     GERD without esophagitis    Overview     Taking famotidine twice a day.           Current Assessment & Plan     -  Continue famotidine twice per day. May add an extra famotidine during the day as needed. Continue to avoid eating close to bedtime and avoid large meals.         Relevant Medications    famotidine (PEPCID) 20 MG tablet       Genitourinary and Reproductive     Prolapsed uterus (Chronic)    Current Assessment & Plan     - She will make an appointment with the gynecologist she used to see at [facility]. She will let us know if a referral is required. We discussed possible use of a pessary since she does not want to have surgery.         Acute cystitis without hematuria (Chronic)    Current Assessment & Plan     - She will finish the Macrobid antibiotic. She will continue drinking lots of fluids every day.             Health Encounters    Annual physical exam    Current Assessment & Plan     - She is up to date on vaccinations except for the shingles vaccine.  - She was advised to get the influenza vaccine in 09/2022 and then wait a month and then get the shingles vaccine at her pharmacy.  - She is up to date with DEXA scan.         Medicare annual wellness visit, subsequent - Primary    Overview                Current Assessment & Plan     - She is up to date on vaccinations except for the shingles vaccine.  - She was advised to get the influenza vaccine in 09/2022 and then wait a month and then get the shingles vaccine at her pharmacy.  - She is up to date with DEXA scan.            Hematology and Neoplasia    Personal history of bladder cancer    Overview     Dr. Giles. Yearly cystoscopy every December.          Current Assessment & Plan     - She will call Dr. Giles for a follow-up visit and cystoscopy.            Musculoskeletal and Injuries    Osteopenia of multiple sites    Overview     DEXA 11/1/2021 showed a decrease in T-scores in the spine, but it is still in the osteopenia range.  T score in the femoral neck is -1.3 which is also in the mild osteopenia range.             Current Assessment & Plan      - She will continue weightbearing exercises with walking and using small hand weights daily at home. She will continue calcium and vitamin D3.            Symptoms and Signs    Other fatigue (Chronic)    Relevant Orders    Vitamin B12 (Completed)           Follow Up:  Return in about 6 months (around 2/16/2023) for recheck fasting.     An After Visit Summary and PPPS were given to the patient.         Transcribed from ambient dictation for Marie Soto MD by Pratibha Soria.  08/16/22   12:44 EDT    Patient verbalized consent to the visit recording.

## 2022-08-16 NOTE — ASSESSMENT & PLAN NOTE
- She will continue weightbearing exercises with walking and using small hand weights daily at home. She will continue calcium and vitamin D3.

## 2022-08-16 NOTE — ASSESSMENT & PLAN NOTE
- She will make an appointment with the gynecologist she used to see at [facility]. She will let us know if a referral is required. We discussed possible use of a pessary since she does not want to have surgery.

## 2022-08-16 NOTE — ASSESSMENT & PLAN NOTE
- Continue famotidine twice per day. May add an extra famotidine during the day as needed. Continue to avoid eating close to bedtime and avoid large meals.

## 2022-08-16 NOTE — PATIENT INSTRUCTIONS
Patient Instructions   Problem List Items Addressed This Visit          Cardiac and Vasculature    Benign essential hypertension    Overview     Taking losartan and hydrochlorothiazide.          Current Assessment & Plan     - Continue hydrochlorothiazide and losartan. Continue to avoid salt in the diet.         Relevant Medications    hydroCHLOROthiazide (HYDRODIURIL) 12.5 MG tablet    metoprolol succinate XL (TOPROL-XL) 50 MG 24 hr tablet    losartan (COZAAR) 25 MG tablet    Other Relevant Orders    Comprehensive Metabolic Panel (Completed)    CBC & Differential (Completed)    Microalbumin / Creatinine Urine Ratio - Urine, Clean Catch (Completed)    Urinalysis With Microscopic - Urine, Clean Catch (Completed)    Mixed hyperlipidemia    Overview     Taking simvastatin .         Current Assessment & Plan     - Continue simvastatin every evening. Continue low-fat diet and regular exercise.         Relevant Medications    omega-3 acid ethyl esters (LOVAZA) 1 g capsule    simvastatin (ZOCOR) 20 MG tablet    Other Relevant Orders    Lipid Panel (Completed)    TSH (Completed)    Atherosclerosis of abdominal aorta (HCC)    Overview     Taking daily coated 81mg aspirin.         Current Assessment & Plan     - She will continue taking daily coated 81 mg aspirin and continue good blood pressure control.         Relevant Medications    aspirin 81 MG tablet       Endocrine and Metabolic    Vitamin D deficiency    Overview     Taking vitamin D3 and calcium.         Current Assessment & Plan     - She may continue current dose of calcium and vitamin D3.         Relevant Orders    Vitamin D 25 Hydroxy (Completed)       Gastrointestinal Abdominal     GERD without esophagitis    Overview     Taking famotidine twice a day.           Current Assessment & Plan     - Continue famotidine twice per day. May add an extra famotidine during the day as needed. Continue to avoid eating close to bedtime and avoid large meals.         Relevant  Medications    famotidine (PEPCID) 20 MG tablet       Genitourinary and Reproductive     Prolapsed uterus (Chronic)    Current Assessment & Plan     - She will make an appointment with the gynecologist she used to see at [facility]. She will let us know if a referral is required. We discussed possible use of a pessary since she does not want to have surgery.         Acute cystitis without hematuria (Chronic)    Current Assessment & Plan     - She will finish the Macrobid antibiotic. She will continue drinking lots of fluids every day.             Health Encounters    Annual physical exam    Current Assessment & Plan     - She is up to date on vaccinations except for the shingles vaccine.  - She was advised to get the influenza vaccine in 09/2022 and then wait a month and then get the shingles vaccine at her pharmacy.  - She is up to date with DEXA scan.         Medicare annual wellness visit, subsequent - Primary    Overview                Current Assessment & Plan     - She is up to date on vaccinations except for the shingles vaccine.  - She was advised to get the influenza vaccine in 09/2022 and then wait a month and then get the shingles vaccine at her pharmacy.  - She is up to date with DEXA scan.            Hematology and Neoplasia    Personal history of bladder cancer    Overview     Dr. Giles. Yearly cystoscopy every December.          Current Assessment & Plan     - She will call Dr. Giles for a follow-up visit and cystoscopy.            Musculoskeletal and Injuries    Osteopenia of multiple sites    Overview     DEXA 11/1/2021 showed a decrease in T-scores in the spine, but it is still in the osteopenia range.  T score in the femoral neck is -1.3 which is also in the mild osteopenia range.             Current Assessment & Plan     - She will continue weightbearing exercises with walking and using small hand weights daily at home. She will continue calcium and vitamin D3.            Symptoms and  Signs    Other fatigue (Chronic)    Relevant Orders    Vitamin B12 (Completed)         Fall Prevention in the Home, Adult  Falls can cause injuries and can affect people from all age groups. There are many simple things that you can do to make your home safe and to help prevent falls. Ask for help when making these changes, if needed.  What actions can I take to prevent falls?  General instructions  Use good lighting in all rooms. Replace any light bulbs that burn out.  Turn on lights if it is dark. Use night-lights.  Place frequently used items in easy-to-reach places. Lower the shelves around your home if necessary.  Set up furniture so that there are clear paths around it. Avoid moving your furniture around.  Remove throw rugs and other tripping hazards from the floor.  Avoid walking on wet floors.  Fix any uneven floor surfaces.  Add color or contrast paint or tape to grab bars and handrails in your home. Place contrasting color strips on the first and last steps of stairways.  When you use a stepladder, make sure that it is completely opened and that the sides are firmly locked. Have someone hold the ladder while you are using it. Do not climb a closed stepladder.  Be aware of any and all pets.  What can I do in the bathroom?         Keep the floor dry. Immediately clean up any water that spills onto the floor.  Remove soap buildup in the tub or shower on a regular basis.  Use non-skid mats or decals on the floor of the tub or shower.  Attach bath mats securely with double-sided, non-slip rug tape.  If you need to sit down while you are in the shower, use a plastic, non-slip stool.  Install grab bars by the toilet and in the tub and shower. Do not use towel bars as grab bars.  What can I do in the bedroom?  Make sure that a bedside light is easy to reach.  Do not use oversized bedding that drapes onto the floor.  Have a firm chair that has side arms to use for getting dressed.  What can I do in the  kitchen?  Clean up any spills right away.  If you need to reach for something above you, use a sturdy step stool that has a grab bar.  Keep electrical cables out of the way.  Do not use floor polish or wax that makes floors slippery. If you must use wax, make sure that it is non-skid floor wax.  What can I do in the stairways?  Do not leave any items on the stairs.  Make sure that you have a light switch at the top of the stairs and the bottom of the stairs. Have them installed if you do not have them.  Make sure that there are handrails on both sides of the stairs. Fix handrails that are broken or loose. Make sure that handrails are as long as the stairways.  Install non-slip stair treads on all stairs in your home.  Avoid having throw rugs at the top or bottom of stairways, or secure the rugs with carpet tape to prevent them from moving.  Choose a carpet design that does not hide the edge of steps on the stairway.  Check any carpeting to make sure that it is firmly attached to the stairs. Fix any carpet that is loose or worn.  What can I do on the outside of my home?  Use bright outdoor lighting.  Regularly repair the edges of walkways and driveways and fix any cracks.  Remove high doorway thresholds.  Trim any shrubbery on the main path into your home.  Regularly check that handrails are securely fastened and in good repair. Both sides of any steps should have handrails.  Install guardrails along the edges of any raised decks or porches.  Clear walkways of debris and clutter, including tools and rocks.  Have leaves, snow, and ice cleared regularly.  Use sand or salt on walkways during winter months.  In the garage, clean up any spills right away, including grease or oil spills.  What other actions can I take?  Wear closed-toe shoes that fit well and support your feet. Wear shoes that have rubber soles or low heels.  Use mobility aids as needed, such as canes, walkers, scooters, and crutches.  Review your  medicines with your health care provider. Some medicines can cause dizziness or changes in blood pressure, which increase your risk of falling.  Talk with your health care provider about other ways that you can decrease your risk of falls. This may include working with a physical therapist or  to improve your strength, balance, and endurance.  Where to find more information  Centers for Disease Control and Prevention, [place]: https://www.cdc.gov  [employer]: https://wo8oxtx.rajeev.nih.gov  Contact a health care provider if:  You are afraid of falling at home.  You feel weak, drowsy, or dizzy at home.  You fall at home.  Summary  There are many simple things that you can do to make your home safe and to help prevent falls.  Ways to make your home safe include removing tripping hazards and installing grab bars in the bathroom.  Ask for help when making these changes in your home.  This information is not intended to replace advice given to you by your health care provider. Make sure you discuss any questions you have with your health care provider.  Document Revised: 11/30/2018 Document Reviewed: 08/02/2018  [employer]vier Patient Education © 2021 [employer]

## 2022-08-16 NOTE — ASSESSMENT & PLAN NOTE
- She is up to date on vaccinations except for the shingles vaccine.  - She was advised to get the influenza vaccine in 09/2022 and then wait a month and then get the shingles vaccine at her pharmacy.  - She is up to date with DEXA scan.

## 2022-12-05 ENCOUNTER — HOSPITAL ENCOUNTER (OUTPATIENT)
Dept: MAMMOGRAPHY | Facility: HOSPITAL | Age: 82
Discharge: HOME | End: 2022-12-05
Admitting: INTERNAL MEDICINE

## 2022-12-05 DIAGNOSIS — Z12.31 VISIT FOR SCREENING MAMMOGRAM: ICD-10-CM

## 2022-12-05 PROCEDURE — 77063 BREAST TOMOSYNTHESIS BI: CPT

## 2022-12-05 PROCEDURE — 77067 SCR MAMMO BI INCL CAD: CPT

## 2022-12-05 PROCEDURE — 77063 BREAST TOMOSYNTHESIS BI: CPT | Performed by: RADIOLOGY

## 2022-12-05 PROCEDURE — 77067 SCR MAMMO BI INCL CAD: CPT | Performed by: RADIOLOGY

## 2023-01-16 RX ORDER — METOPROLOL SUCCINATE 50 MG/1
50 TABLET, EXTENDED RELEASE ORAL 2 TIMES DAILY
Qty: 180 TABLET | Refills: 1 | Status: SHIPPED | OUTPATIENT
Start: 2023-01-16

## 2023-01-16 RX ORDER — HYDROCHLOROTHIAZIDE 12.5 MG/1
12.5 TABLET ORAL DAILY
Qty: 90 TABLET | Refills: 1 | Status: SHIPPED | OUTPATIENT
Start: 2023-01-16 | End: 2023-02-15 | Stop reason: SDUPTHER

## 2023-02-13 ENCOUNTER — LAB (OUTPATIENT)
Dept: LAB | Facility: HOSPITAL | Age: 83
End: 2023-02-13
Payer: MEDICARE

## 2023-02-13 DIAGNOSIS — E78.2 MIXED HYPERLIPIDEMIA: ICD-10-CM

## 2023-02-13 DIAGNOSIS — E55.9 VITAMIN D DEFICIENCY: ICD-10-CM

## 2023-02-13 DIAGNOSIS — I10 BENIGN ESSENTIAL HYPERTENSION: ICD-10-CM

## 2023-02-14 LAB
25(OH)D3+25(OH)D2 SERPL-MCNC: 87.6 NG/ML (ref 30–100)
ALBUMIN SERPL-MCNC: 4.9 G/DL (ref 3.5–5.2)
ALBUMIN/CREAT UR: 30 MG/G CREAT (ref 0–29)
ALBUMIN/GLOB SERPL: 2.9 G/DL
ALP SERPL-CCNC: 89 U/L (ref 39–117)
ALT SERPL-CCNC: 22 U/L (ref 1–33)
APPEARANCE UR: ABNORMAL
AST SERPL-CCNC: 24 U/L (ref 1–32)
BACTERIA #/AREA URNS HPF: ABNORMAL /HPF
BASOPHILS # BLD AUTO: 0.03 10*3/MM3 (ref 0–0.2)
BASOPHILS NFR BLD AUTO: 0.5 % (ref 0–1.5)
BILIRUB SERPL-MCNC: 0.4 MG/DL (ref 0–1.2)
BILIRUB UR QL STRIP: NEGATIVE
BUN SERPL-MCNC: 19 MG/DL (ref 8–23)
BUN/CREAT SERPL: 21.8 (ref 7–25)
CALCIUM SERPL-MCNC: 10.1 MG/DL (ref 8.6–10.5)
CASTS URNS MICRO: ABNORMAL
CHLORIDE SERPL-SCNC: 96 MMOL/L (ref 98–107)
CHOLEST SERPL-MCNC: 168 MG/DL (ref 0–200)
CO2 SERPL-SCNC: 31 MMOL/L (ref 22–29)
COLOR UR: YELLOW
CREAT SERPL-MCNC: 0.87 MG/DL (ref 0.57–1)
CREAT UR-MCNC: 63.7 MG/DL
EGFRCR SERPLBLD CKD-EPI 2021: 67 ML/MIN/1.73
EOSINOPHIL # BLD AUTO: 0.07 10*3/MM3 (ref 0–0.4)
EOSINOPHIL NFR BLD AUTO: 1.2 % (ref 0.3–6.2)
EPI CELLS #/AREA URNS HPF: ABNORMAL /HPF
ERYTHROCYTE [DISTWIDTH] IN BLOOD BY AUTOMATED COUNT: 13.2 % (ref 12.3–15.4)
GLOBULIN SER CALC-MCNC: 1.7 GM/DL
GLUCOSE SERPL-MCNC: 95 MG/DL (ref 65–99)
GLUCOSE UR QL STRIP: NEGATIVE
HCT VFR BLD AUTO: 41.9 % (ref 34–46.6)
HDLC SERPL-MCNC: 59 MG/DL (ref 40–60)
HGB BLD-MCNC: 14.1 G/DL (ref 12–15.9)
HGB UR QL STRIP: ABNORMAL
IMM GRANULOCYTES # BLD AUTO: 0.03 10*3/MM3 (ref 0–0.05)
IMM GRANULOCYTES NFR BLD AUTO: 0.5 % (ref 0–0.5)
KETONES UR QL STRIP: NEGATIVE
LDLC SERPL CALC-MCNC: 84 MG/DL (ref 0–100)
LEUKOCYTE ESTERASE UR QL STRIP: ABNORMAL
LYMPHOCYTES # BLD AUTO: 1.86 10*3/MM3 (ref 0.7–3.1)
LYMPHOCYTES NFR BLD AUTO: 32.6 % (ref 19.6–45.3)
MCH RBC QN AUTO: 30.7 PG (ref 26.6–33)
MCHC RBC AUTO-ENTMCNC: 33.7 G/DL (ref 31.5–35.7)
MCV RBC AUTO: 91.3 FL (ref 79–97)
MICROALBUMIN UR-MCNC: 19 UG/ML
MONOCYTES # BLD AUTO: 0.7 10*3/MM3 (ref 0.1–0.9)
MONOCYTES NFR BLD AUTO: 12.3 % (ref 5–12)
NEUTROPHILS # BLD AUTO: 3.02 10*3/MM3 (ref 1.7–7)
NEUTROPHILS NFR BLD AUTO: 52.9 % (ref 42.7–76)
NITRITE UR QL STRIP: NEGATIVE
NRBC BLD AUTO-RTO: 0 /100 WBC (ref 0–0.2)
PH UR STRIP: 6.5 [PH] (ref 5–8)
PLATELET # BLD AUTO: 186 10*3/MM3 (ref 140–450)
POTASSIUM SERPL-SCNC: 4.2 MMOL/L (ref 3.5–5.2)
PROT SERPL-MCNC: 6.6 G/DL (ref 6–8.5)
PROT UR QL STRIP: NEGATIVE
RBC # BLD AUTO: 4.59 10*6/MM3 (ref 3.77–5.28)
RBC #/AREA URNS HPF: ABNORMAL /HPF
SODIUM SERPL-SCNC: 136 MMOL/L (ref 136–145)
SP GR UR STRIP: 1.01 (ref 1–1.03)
TRIGL SERPL-MCNC: 143 MG/DL (ref 0–150)
TSH SERPL DL<=0.005 MIU/L-ACNC: 3.57 UIU/ML (ref 0.27–4.2)
UROBILINOGEN UR STRIP-MCNC: ABNORMAL MG/DL
VLDLC SERPL CALC-MCNC: 25 MG/DL (ref 5–40)
WBC # BLD AUTO: 5.71 10*3/MM3 (ref 3.4–10.8)
WBC #/AREA URNS HPF: ABNORMAL /HPF

## 2023-02-15 ENCOUNTER — LAB (OUTPATIENT)
Dept: LAB | Facility: HOSPITAL | Age: 83
End: 2023-02-15
Payer: MEDICARE

## 2023-02-15 ENCOUNTER — OFFICE VISIT (OUTPATIENT)
Dept: INTERNAL MEDICINE | Facility: CLINIC | Age: 83
End: 2023-02-15
Payer: MEDICARE

## 2023-02-15 VITALS
OXYGEN SATURATION: 96 % | SYSTOLIC BLOOD PRESSURE: 166 MMHG | DIASTOLIC BLOOD PRESSURE: 72 MMHG | TEMPERATURE: 97.8 F | HEART RATE: 67 BPM | WEIGHT: 140.2 LBS | BODY MASS INDEX: 25.8 KG/M2 | HEIGHT: 62 IN

## 2023-02-15 DIAGNOSIS — I70.0 ATHEROSCLEROSIS OF ABDOMINAL AORTA: ICD-10-CM

## 2023-02-15 DIAGNOSIS — Z91.81 RISK FOR FALLS: ICD-10-CM

## 2023-02-15 DIAGNOSIS — R82.81 PYURIA: ICD-10-CM

## 2023-02-15 DIAGNOSIS — K21.9 GERD WITHOUT ESOPHAGITIS: ICD-10-CM

## 2023-02-15 DIAGNOSIS — N39.3 STRESS INCONTINENCE OF URINE: ICD-10-CM

## 2023-02-15 DIAGNOSIS — I10 BENIGN ESSENTIAL HYPERTENSION: Primary | ICD-10-CM

## 2023-02-15 DIAGNOSIS — N81.89 OTHER FEMALE GENITAL PROLAPSE: ICD-10-CM

## 2023-02-15 DIAGNOSIS — M85.89 OSTEOPENIA OF MULTIPLE SITES: ICD-10-CM

## 2023-02-15 DIAGNOSIS — H16.9: Chronic | ICD-10-CM

## 2023-02-15 DIAGNOSIS — E78.2 MIXED HYPERLIPIDEMIA: ICD-10-CM

## 2023-02-15 DIAGNOSIS — E55.9 VITAMIN D DEFICIENCY: ICD-10-CM

## 2023-02-15 PROCEDURE — 99214 OFFICE O/P EST MOD 30 MIN: CPT | Performed by: INTERNAL MEDICINE

## 2023-02-15 RX ORDER — AMOXICILLIN 500 MG/1
CAPSULE ORAL
COMMUNITY
Start: 2022-11-10

## 2023-02-15 RX ORDER — HYDROCHLOROTHIAZIDE 12.5 MG/1
12.5 TABLET ORAL DAILY
Qty: 90 TABLET | Refills: 1 | Status: SHIPPED | OUTPATIENT
Start: 2023-02-15

## 2023-02-15 RX ORDER — VALACYCLOVIR HYDROCHLORIDE 500 MG/1
500 TABLET, FILM COATED ORAL DAILY
COMMUNITY
Start: 2023-01-05

## 2023-02-15 RX ORDER — SIMVASTATIN 20 MG
20 TABLET ORAL
Qty: 90 TABLET | Refills: 1 | Status: SHIPPED | OUTPATIENT
Start: 2023-02-15

## 2023-02-15 NOTE — ASSESSMENT & PLAN NOTE
The patient will continue famotidine. Continue to avoid eating close to bedtime and avoid large meals.

## 2023-02-15 NOTE — ASSESSMENT & PLAN NOTE
The patient will continue losartan and hydrochlorothiazide. She will continue to avoid salt in the diet and continue walking daily.

## 2023-02-15 NOTE — ASSESSMENT & PLAN NOTE
We will do a urine culture. She is asymptomatic, so the white blood cells in the urine are probably due to contamination and not infection. She will come back to see me for a preop before her surgery.

## 2023-02-15 NOTE — PROGRESS NOTES
[facility]     Amanda Lawson  1940   [id number]      Patient Care Team:  Marie Soto MD as PCP - General  Marie Soto MD as PCP - Family Medicine  Holly Pierce MD as Consulting Physician (Ophthalmology)  Kenneth Giles MD as Consulting Physician (Urology)    Chief Complaint   Patient presents with   • Hypertension            HPI  Patient is a 82 y.o. female who presents today for a fasting follow-up.    Prolapsing bladder  The patient thought she had a prolapsed uterus, but her gynecologist told her it was a prolapsing bladder. She is terrified of having surgery, and has never had surgery before. Her gynecologist gave her a book leaflet that showed what she would do. Her friend told her if she waits too long, she will be too old. Her gynecologist does not like the pessary. The patient does not want to jeopardize her walking. Her daughter is consumed with their daughter picking a graduate school, and they are extremely busy, and she knows she will need help. She is considering surgery this fall. The patient asked about the preoperative exam and what all that involves. She will give a urine sample today to send a culture.    Corneal inflammation  Her eye doctor put her on valacyclovir, and she is on it until 05/2023. She was taking it twice per day for the first week, and now she is taking it once per day. The patient denies any side effects. They think it might be a viral thing affecting her cornea in the right eye. 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    Vitamin D deficiency  She is still taking the extra vitamin D. The patient will stop the extra vitamin D now and continue the calcium with D.    Hypertension  She is taking losartan and hydrochlorothiazide. Her blood pressure was high today. She checks her blood pressure at home, and it has been really good at around 134 mmHg. One time it was 140 mmHg, but it has never been 160 mmHg. She is trying to avoid salt in  her diet, but she does eat frozen meals occasionally. The patient tries to make her own soups.      Hyperlipidemia  She is taking simvastatin for her cholesterol, and it is working well for her. Her total cholesterol is very good at 168 mg/dL. The LDL came down from 107 to 91 mg/dL and is now 84 mg/dL. HDL is over 50 and triglycerides are less than 150 mg/dL.    Abnormal glucose  Her blood glucose was 95 mg/dL.     Heartburn  The patient is taking famotidine for her heartburn. She tries not to eat anything too spicy at night.    Glaucoma   Her glaucoma is under control. She has had 2 laser surgeries.    Health maintenance  She is up to date on her influenza vaccine.  The patient is up to date on her immunizations.         CHRONIC CONDITIONS      Past Medical History:   Diagnosis Date   • Allergic    • Bladder cancer (HCC) 1990   • Closed fracture of nasal bone with routine healing 5/22/2020 5/22/2020 Marie Soto MD  Pain from nasal fracture has improved.  She no longer needs to take Tylenol.  There is mild residual swelling in the nasal cavity.   • Hyperlipidemia    • Hypertension    • Laceration of brow without complication 5/22/2020 5/22/2020 Marie Soto MD  Laceration of right brow due to fall due to syncope.    Stitches removed today.  Steri-Strips applied to a 3 mm area without good approximation.  The rest of the laceration is well-healed.  She was advised to stop using Neosporin and just use a little Vaseline on it daily.   • Overweight with body mass index (BMI) of 26 to 26.9 in adult 2/4/2021 2/4/2021 Marie Soto MD  Mildly overweight BMI at 26.  Continue low-fat diet.  Avoid sugars and avoid snacking.  Increase exercise and physical activity.  Weigh every Monday morning to monitor.   • Plantar fasciitis 2010   • Rosacea    • Traumatic black eye, initial encounter 5/22/2020 5/22/2020 Marie Soto MD  Bilateral delmy-orbital ecchymoses due to fall due to syncope.   •  "Vasovagal syncope     since childhood       Past Surgical History:   Procedure Laterality Date   • CATARACT EXTRACTION Left 07/2022   • ENDOMETRIAL BIOPSY  1983       Family History   Problem Relation Age of Onset   • Coronary artery disease Mother    • Hypertension Father    • Coronary artery disease Brother    • Hyperlipidemia Daughter    • Obesity Maternal Grandmother    • Mitral valve prolapse Daughter    • Breast cancer Neg Hx    • Ovarian cancer Neg Hx        Social History     Socioeconomic History   • Marital status:    Tobacco Use   • Smoking status: Never   • Smokeless tobacco: Never   Vaping Use   • Vaping Use: Never used   Substance and Sexual Activity   • Alcohol use: Not Currently   • Drug use: No   • Sexual activity: Defer       Allergies   Allergen Reactions   • Brimonidine Other (See Comments)     hypotension   • Timolol Rash         Vital Signs  Vitals:    02/15/23 1014   BP: 166/72   BP Location: Left arm   Patient Position: Sitting   Cuff Size: Adult   Pulse: 67   Temp: 97.8 °F (36.6 °C)   TempSrc: Infrared   SpO2: 96%   Weight: 63.6 kg (140 lb 3.2 oz)   Height: 157.5 cm (62.01\")     Body mass index is 25.64 kg/m².  BMI is >= 25 and <30. (Overweight) The following options were offered after discussion;: exercise counseling/recommendations and nutrition counseling/recommendations        Current Outpatient Medications:   •  amoxicillin (AMOXIL) 500 MG capsule, TAKE 4 CAPSULES BY MOUTH AS A SINGLE DOSE 1 HOUR BEFORE DENTAL APPOINTMENT, Disp: , Rfl:   •  aspirin 81 MG tablet, Take 1 tablet by mouth Daily., Disp: 90 tablet, Rfl: 1  •  CALCIUM-VITAMIN D PO, Take  by mouth Daily. 1 chewable in the morning and after dinner., Disp: , Rfl:   •  Coenzyme Q10 (CO Q 10) 100 MG capsule, Take 1 capsule by mouth As Needed., Disp: , Rfl:   •  famotidine (PEPCID) 20 MG tablet, Take 1 tablet by mouth 2 (Two) Times a Day., Disp: 180 tablet, Rfl: 1  •  hydroCHLOROthiazide (HYDRODIURIL) 12.5 MG tablet, Take 1 " tablet by mouth Daily., Disp: 90 tablet, Rfl: 1  •  losartan (COZAAR) 25 MG tablet, Take 1 tablet by mouth Daily., Disp: 90 tablet, Rfl: 1  •  metoprolol succinate XL (TOPROL-XL) 50 MG 24 hr tablet, Take 1 tablet by mouth 2 (Two) Times a Day., Disp: 180 tablet, Rfl: 1  •  Multiple Vitamins-Minerals (SENIOR MULTIVITAMIN PLUS PO), Take 1 tablet by mouth Daily., Disp: , Rfl:   •  omega-3 acid ethyl esters (LOVAZA) 1 g capsule, Take 2 capsules by mouth Every Night., Disp: 180 capsule, Rfl: 1  •  simvastatin (ZOCOR) 20 MG tablet, Take 1 tablet by mouth every night at bedtime., Disp: 90 tablet, Rfl: 1  •  sodium chloride (KARRIE 128) 5 % ophthalmic solution, Apply 1 drop to eye(s) as directed by provider 2 (Two) Times a Day. Every morning and night, Disp: , Rfl:   •  timolol (TIMOPTIC-XR) 0.5 % ophthalmic gel-forming, Administer 1 drop to both eyes Every Night., Disp: , Rfl:   •  travoprost, BRANDYN free, (TRAVATAN) 0.004 % solution ophthalmic solution, Daily. Instill 1 drop into each eye once daily, Disp: , Rfl:   •  valACYclovir (VALTREX) 500 MG tablet, Take 500 mg by mouth Daily., Disp: , Rfl:     Physical Exam:    Physical Exam  Vitals and nursing note reviewed.   Constitutional:       Appearance: She is well-developed.   HENT:      Head: Normocephalic.   Eyes:      Conjunctiva/sclera: Conjunctivae normal.      Pupils: Pupils are equal, round, and reactive to light.   Neck:      Thyroid: No thyromegaly.   Cardiovascular:      Rate and Rhythm: Normal rate and regular rhythm.      Heart sounds: Normal heart sounds.   Pulmonary:      Effort: Pulmonary effort is normal.      Breath sounds: Normal breath sounds.   Musculoskeletal:         General: Normal range of motion.      Cervical back: Normal range of motion and neck supple.   Lymphadenopathy:      Cervical: No cervical adenopathy.   Neurological:      Mental Status: She is alert and oriented to person, place, and time.   Psychiatric:         Thought Content: Thought  content normal.      Comments: Normal mood, normal attention, normal cognition, normal judgment.           ACE III MINI        Results Review:    I reviewed the patient's new clinical results.    CMP:  Lab Results   Component Value Date    BUN 19 02/13/2023    CREATININE 0.87 02/13/2023    EGFRIFNONA 60 (L) 02/16/2022    EGFRIFAFRI 73 02/16/2022    BCR 21.8 02/13/2023     02/13/2023    K 4.2 02/13/2023    CO2 31.0 (H) 02/13/2023    CALCIUM 10.1 02/13/2023    PROTENTOTREF 6.6 02/13/2023    ALBUMIN 4.9 02/13/2023    LABGLOBREF 1.7 02/13/2023    LABIL2 2.9 02/13/2023    BILITOT 0.4 02/13/2023    ALKPHOS 89 02/13/2023    AST 24 02/13/2023    ALT 22 02/13/2023     HbA1c:  No results found for: HGBA1C  Microalbumin:  Lab Results   Component Value Date    MICROALBUR 19.0 02/13/2023     Lipid Panel  Lab Results   Component Value Date    CHOL 148 02/04/2019    TRIG 143 02/13/2023    HDL 59 02/13/2023    LDL 84 02/13/2023    AST 24 02/13/2023    ALT 22 02/13/2023     Labs reviewed:   Urinalysis showed no significant infection this time. The protein in the urine is better than previous. There is a trace of blood. White blood cells in the urine were numerous and only 3 to 5 red blood cells. Nitrates were negative. There is probably no infection.    Her vitamin D is now up to 87.   Liver enzymes are normal.    Blood glucose is good.   Blood cell counts are all good.   Thyroid is normal.     Medication Review: Medications reviewed and noted  Patient Instructions   Problem List Items Addressed This Visit        Advance Directives and General Issues    Risk for falls    Current Assessment & Plan     She will continue walking regularly and exercising.            Cardiac and Vasculature    Benign essential hypertension - Primary    Overview     Taking losartan and hydrochlorothiazide.          Current Assessment & Plan     The patient will continue losartan and hydrochlorothiazide. She will continue to avoid salt in the diet  and continue walking daily.         Relevant Medications    losartan (COZAAR) 25 MG tablet    metoprolol succinate XL (TOPROL-XL) 50 MG 24 hr tablet    hydroCHLOROthiazide (HYDRODIURIL) 12.5 MG tablet    Other Relevant Orders    CBC & Differential (Completed)    Comprehensive Metabolic Panel (Completed)    Urinalysis With Microscopic - Urine, Clean Catch (Completed)    Microalbumin / Creatinine Urine Ratio - Urine, Clean Catch (Completed)    Mixed hyperlipidemia    Overview     Taking simvastatin .         Current Assessment & Plan     Continue simvastatin every evening. Continue low-fat diet and regular exercise.           Relevant Medications    omega-3 acid ethyl esters (LOVAZA) 1 g capsule    simvastatin (ZOCOR) 20 MG tablet    Other Relevant Orders    Lipid Panel (Completed)    TSH (Completed)    Atherosclerosis of abdominal aorta (HCC)    Overview     Taking daily coated 81mg aspirin.         Current Assessment & Plan     She will continue taking 81 mg aspirin daily and continue good blood pressure control.         Relevant Medications    aspirin 81 MG tablet       Endocrine and Metabolic    Vitamin D deficiency    Overview     Taking vitamin D3 and calcium.         Current Assessment & Plan     She will stop taking the extra vitamin D3. Continue the calcium with the D in it.         Relevant Orders    Vitamin D,25-Hydroxy (Completed)       Eye    Corneal inflammation, bilateral (Chronic)    Overview     tAKING VALACYCLOVIR DAILY.         Current Assessment & Plan     She will continue valacyclovir and follow up with the ophthalmologist.         Relevant Medications    sodium chloride (KARRIE 128) 5 % ophthalmic solution    timolol (TIMOPTIC-XR) 0.5 % ophthalmic gel-forming    travoprost, BRANDYN free, (TRAVATAN) 0.004 % solution ophthalmic solution       Gastrointestinal Abdominal     GERD without esophagitis    Overview     Taking famotidine twice a day.           Current Assessment & Plan     The patient will  continue famotidine. Continue to avoid eating close to bedtime and avoid large meals.         Relevant Medications    famotidine (PEPCID) 20 MG tablet       Genitourinary and Reproductive     Other female genital prolapse    Overview     Bladder prolapse.  Seeing Dr. Celis.         Current Assessment & Plan     She will follow up with Dr. Celis and plan surgery soon.         Stress incontinence of urine    Current Assessment & Plan     The patient is encouraged to do some Kegel exercise every day. Follow up with Dr. Celis soon.         Pyuria    Current Assessment & Plan     We will do a urine culture. She is asymptomatic, so the white blood cells in the urine are probably due to contamination and not infection. She will come back to see me for a preop before her surgery.          Relevant Orders    Urine Culture - Urine, Urine, Clean Catch       Musculoskeletal and Injuries    Osteopenia of multiple sites    Overview     DEXA 11/1/2021 showed a decrease in T-scores in the spine, but it is still in the osteopenia range.  T score in the femoral neck is -1.3 which is also in the mild osteopenia range.             Current Assessment & Plan     She will continue walking daily. Continue calcium with D in it.               Diagnosis Plan   1. Benign essential hypertension  CBC & Differential    Comprehensive Metabolic Panel    Urinalysis With Microscopic - Urine, Clean Catch    Microalbumin / Creatinine Urine Ratio - Urine, Clean Catch    hydroCHLOROthiazide (HYDRODIURIL) 12.5 MG tablet      2. Mixed hyperlipidemia  Lipid Panel    TSH    simvastatin (ZOCOR) 20 MG tablet      3. Other female genital prolapse        4. Pyuria  Urine Culture - Urine, Urine, Clean Catch      5. Stress incontinence of urine        6. Atherosclerosis of abdominal aorta (HCC)        7. GERD without esophagitis        8. Vitamin D deficiency  Vitamin D,25-Hydroxy      9. Osteopenia of multiple sites        10. Corneal inflammation,  bilateral        11. Risk for falls          Follow-up: We will go on and schedule her annual wellness visit for 6 months from now.        Plan of care reviewed with patient at the conclusion of today's visit. Education was provided regarding diagnosis, management, and any prescribed or recommended OTC medications.Patient verbalizes understanding of and agreement with management plan.         Marie Soto MD      Transcribed from ambient dictation for Marie Soto MD by Pratibha Soria.  02/15/23   12:16 EST    Patient or patient representative verbalized consent to the visit recording.  I have personally performed the services described in this document as transcribed by the above individual, and it is both accurate and complete.

## 2023-02-16 NOTE — PATIENT INSTRUCTIONS
Patient Instructions   Problem List Items Addressed This Visit          Advance Directives and General Issues    Risk for falls    Current Assessment & Plan     She will continue walking regularly and exercising.            Cardiac and Vasculature    Benign essential hypertension - Primary    Overview     Taking losartan and hydrochlorothiazide.          Current Assessment & Plan     The patient will continue losartan and hydrochlorothiazide. She will continue to avoid salt in the diet and continue walking daily.         Relevant Medications    losartan (COZAAR) 25 MG tablet    metoprolol succinate XL (TOPROL-XL) 50 MG 24 hr tablet    hydroCHLOROthiazide (HYDRODIURIL) 12.5 MG tablet    Other Relevant Orders    CBC & Differential (Completed)    Comprehensive Metabolic Panel (Completed)    Urinalysis With Microscopic - Urine, Clean Catch (Completed)    Microalbumin / Creatinine Urine Ratio - Urine, Clean Catch (Completed)    Mixed hyperlipidemia    Overview     Taking simvastatin .         Current Assessment & Plan     Continue simvastatin every evening. Continue low-fat diet and regular exercise.           Relevant Medications    omega-3 acid ethyl esters (LOVAZA) 1 g capsule    simvastatin (ZOCOR) 20 MG tablet    Other Relevant Orders    Lipid Panel (Completed)    TSH (Completed)    Atherosclerosis of abdominal aorta (HCC)    Overview     Taking daily coated 81mg aspirin.         Current Assessment & Plan     She will continue taking 81 mg aspirin daily and continue good blood pressure control.         Relevant Medications    aspirin 81 MG tablet       Endocrine and Metabolic    Vitamin D deficiency    Overview     Taking vitamin D3 and calcium.         Current Assessment & Plan     She will stop taking the extra vitamin D3. Continue the calcium with the D in it.         Relevant Orders    Vitamin D,25-Hydroxy (Completed)       Eye    Corneal inflammation, bilateral (Chronic)    Overview     tAKING VALACYCLOVIR  DAILY.         Current Assessment & Plan     She will continue valacyclovir and follow up with the ophthalmologist.         Relevant Medications    sodium chloride (KARRIE 128) 5 % ophthalmic solution    timolol (TIMOPTIC-XR) 0.5 % ophthalmic gel-forming    travoprost, BRANDYN free, (TRAVATAN) 0.004 % solution ophthalmic solution       Gastrointestinal Abdominal     GERD without esophagitis    Overview     Taking famotidine twice a day.           Current Assessment & Plan     The patient will continue famotidine. Continue to avoid eating close to bedtime and avoid large meals.         Relevant Medications    famotidine (PEPCID) 20 MG tablet       Genitourinary and Reproductive     Other female genital prolapse    Overview     Bladder prolapse.  Seeing Dr. Celis.         Current Assessment & Plan     She will follow up with Dr. Celis and plan surgery soon.         Stress incontinence of urine    Current Assessment & Plan     The patient is encouraged to do some Kegel exercise every day. Follow up with Dr. Celis soon.         Pyuria    Current Assessment & Plan     We will do a urine culture. She is asymptomatic, so the white blood cells in the urine are probably due to contamination and not infection. She will come back to see me for a preop before her surgery.          Relevant Orders    Urine Culture - Urine, Urine, Clean Catch       Musculoskeletal and Injuries    Osteopenia of multiple sites    Overview     DEXA 11/1/2021 showed a decrease in T-scores in the spine, but it is still in the osteopenia range.  T score in the femoral neck is -1.3 which is also in the mild osteopenia range.             Current Assessment & Plan     She will continue walking daily. Continue calcium with D in it.            Fall Prevention in the Home, Adult  Falls can cause injuries and affect people of all ages. There are many simple things that you can do to make your home safe and to help prevent falls. Ask for help when making these  changes, if needed.  What actions can I take to prevent falls?  General instructions  Use good lighting in all rooms. Replace any light bulbs that burn out, turn on lights if it is dark, and use night-lights.  Place frequently used items in easy-to-reach places. Lower the shelves around your home if necessary.  Set up furniture so that there are clear paths around it. Avoid moving your furniture around.  Remove throw rugs and other tripping hazards from the floor.  Avoid walking on wet floors.  Fix any uneven floor surfaces.  Add color or contrast paint or tape to grab bars and handrails in your home. Place contrasting color strips on the first and last steps of staircases.  When you use a stepladder, make sure that it is completely opened and that the sides and supports are firmly locked. Have someone hold the ladder while you are using it. Do not climb a closed stepladder.  Know where your pets are when moving through your home.  What can I do in the bathroom?     Keep the floor dry. Immediately clean up any water that is on the floor.  Remove soap buildup in the tub or shower regularly.  Use nonskid mats or decals on the floor of the tub or shower.  Attach bath mats securely with double-sided, nonslip rug tape.  If you need to sit down while you are in the shower, use a plastic, nonslip stool.  Install grab bars by the toilet and in the tub and shower. Do not use towel bars as grab bars.  What can I do in the bedroom?  Make sure that a bedside light is easy to reach.  Do not use oversized bedding that reaches the floor.  Have a firm chair that has side arms to use for getting dressed.  What can I do in the kitchen?  Clean up any spills right away.  If you need to reach for something above you, use a sturdy step stool that has a grab bar.  Keep electrical cables out of the way.  Do not use floor polish or wax that makes floors slippery. If you must use wax, make sure that it is non-skid floor wax.  What can I do  with my stairs?  Do not leave any items on the stairs.  Make sure that you have a light switch at the top and the bottom of the stairs. Have them installed if you do not have them.  Make sure that there are handrails on both sides of the stairs. Fix handrails that are broken or loose. Make sure that handrails are as long as the staircases.  Install non-slip stair treads on all stairs in your home.  Avoid having throw rugs at the top or bottom of stairs, or secure the rugs with carpet tape to prevent them from moving.  Choose a carpet design that does not hide the edge of steps on the stairs.  Check any carpeting to make sure that it is firmly attached to the stairs. Fix any carpet that is loose or worn.  What can I do on the outside of my home?  Use bright outdoor lighting.  Regularly repair the edges of walkways and driveways and fix any cracks.  Remove high doorway thresholds.  Trim any shrubbery on the main path into your home.  Regularly check that handrails are securely fastened and in good repair. Both sides of all steps should have handrails.  Install guardrails along the edges of any raised decks or porches.  Clear walkways of debris and clutter, including tools and rocks.  Have leaves, snow, and ice cleared regularly.  Use sand or salt on walkways during winter months.  In the garage, clean up any spills right away, including grease or oil spills.  What other actions can I take?  Wear closed-toe shoes that fit well and support your feet. Wear shoes that have rubber soles or low heels.  Use mobility aids as needed, such as canes, walkers, scooters, and crutches.  Review your medicines with your health care provider. Some medicines can cause dizziness or changes in blood pressure, which increase your risk of falling.  Talk with your health care provider about other ways that you can decrease your risk of falls. This may include working with a physical therapist or  to improve your strength, balance,  and endurance.  Where to find more information  Centers for Disease Control and Prevention, [place]: www.cdc.gov  National [place] on Aging: www.rajeev.nih.gov  Contact a health care provider if:  You are afraid of falling at home.  You feel weak, drowsy, or dizzy at home.  You fall at home.  Summary  There are many simple things that you can do to make your home safe and to help prevent falls.  Ways to make your home safe include removing tripping hazards and installing grab bars in the bathroom.  Ask for help when making these changes in your home.  This information is not intended to replace advice given to you by your health care provider. Make sure you discuss any questions you have with your health care provider.  Document Revised: 09/19/2022 Document Reviewed: 07/21/2021  Elsevier Patient Education © 2022 [employer]

## 2023-02-17 LAB
BACTERIA UR CULT: NORMAL
BACTERIA UR CULT: NORMAL

## 2023-03-02 ENCOUNTER — CLINICAL SUPPORT (OUTPATIENT)
Dept: INTERNAL MEDICINE | Facility: CLINIC | Age: 83
End: 2023-03-02
Payer: MEDICARE

## 2023-03-02 ENCOUNTER — TELEPHONE (OUTPATIENT)
Dept: INTERNAL MEDICINE | Facility: CLINIC | Age: 83
End: 2023-03-02
Payer: MEDICARE

## 2023-03-02 VITALS — DIASTOLIC BLOOD PRESSURE: 80 MMHG | SYSTOLIC BLOOD PRESSURE: 148 MMHG

## 2023-03-02 RX ORDER — LOSARTAN POTASSIUM 50 MG/1
50 TABLET ORAL DAILY
Qty: 90 TABLET | Refills: 1 | Status: SHIPPED | OUTPATIENT
Start: 2023-03-02

## 2023-03-13 RX ORDER — OMEGA-3-ACID ETHYL ESTERS 1 G/1
2 CAPSULE, LIQUID FILLED ORAL NIGHTLY
Qty: 180 CAPSULE | Refills: 1 | Status: SHIPPED | OUTPATIENT
Start: 2023-03-13

## 2023-03-13 NOTE — TELEPHONE ENCOUNTER
Rx Refill Note  Requested Prescriptions     Pending Prescriptions Disp Refills   • omega-3 acid ethyl esters (LOVAZA) 1 g capsule 180 capsule 1     Sig: Take 2 capsules by mouth Every Night.      Last office visit with prescribing clinician: 2/15/2023   Last telemedicine visit with prescribing clinician: 8/18/2023   Next office visit with prescribing clinician: 8/18/2023                         Would you like a call back once the refill request has been completed: [] Yes [] No    If the office needs to give you a call back, can they leave a voicemail: [] Yes [] No    Sonali Donnelly LPN  03/13/23, 11:28 EDT

## 2023-05-24 ENCOUNTER — EXTERNAL PBMM DATA (OUTPATIENT)
Dept: PHARMACY | Facility: OTHER | Age: 83
End: 2023-05-24
Payer: MEDICARE

## 2023-05-24 ENCOUNTER — PATIENT MESSAGE (OUTPATIENT)
Dept: INTERNAL MEDICINE | Facility: CLINIC | Age: 83
End: 2023-05-24
Payer: MEDICARE

## 2023-05-24 DIAGNOSIS — I10 BENIGN ESSENTIAL HYPERTENSION: ICD-10-CM

## 2023-05-24 RX ORDER — HYDROCHLOROTHIAZIDE 12.5 MG/1
12.5 TABLET ORAL DAILY
Qty: 90 TABLET | Refills: 1 | Status: SHIPPED | OUTPATIENT
Start: 2023-05-24

## 2023-05-24 RX ORDER — METOPROLOL SUCCINATE 50 MG/1
50 TABLET, EXTENDED RELEASE ORAL 2 TIMES DAILY
Qty: 180 TABLET | Refills: 1 | Status: SHIPPED | OUTPATIENT
Start: 2023-05-24

## 2023-05-24 NOTE — TELEPHONE ENCOUNTER
Rx Refill Note  Requested Prescriptions     Pending Prescriptions Disp Refills   • metoprolol succinate XL (TOPROL-XL) 50 MG 24 hr tablet 180 tablet 1     Sig: Take 1 tablet by mouth 2 (Two) Times a Day.   • hydroCHLOROthiazide (HYDRODIURIL) 12.5 MG tablet 90 tablet 1     Sig: Take 1 tablet by mouth Daily.      Last office visit with prescribing clinician: 2/15/2023   Last telemedicine visit with prescribing clinician: Visit date not found   Next office visit with prescribing clinician: 8/18/2023                           Sharri Bolaños RN  05/24/23, 11:23 EDT

## 2023-05-24 NOTE — TELEPHONE ENCOUNTER
From: Amanda Lawson  To: Marie Soto  Sent: 5/24/2023 11:19 AM EDT  Subject: renewal of 2 prescriptions at [facility] phone no. [phone], [place], [place]    metoprolol succ er 50mgTab- 90 days   hydrochlorothiazide 12.5 mg Tab - 90 days Thank you

## 2023-08-09 ENCOUNTER — TRANSCRIBE ORDERS (OUTPATIENT)
Dept: INTERNAL MEDICINE | Facility: CLINIC | Age: 83
End: 2023-08-09
Payer: MEDICARE

## 2023-08-09 DIAGNOSIS — Z12.31 BREAST CANCER SCREENING BY MAMMOGRAM: Primary | ICD-10-CM

## 2023-08-15 ENCOUNTER — LAB (OUTPATIENT)
Dept: LAB | Facility: HOSPITAL | Age: 83
End: 2023-08-15
Payer: MEDICARE

## 2023-08-15 DIAGNOSIS — I10 BENIGN ESSENTIAL HYPERTENSION: ICD-10-CM

## 2023-08-15 DIAGNOSIS — E55.9 VITAMIN D DEFICIENCY: ICD-10-CM

## 2023-08-15 DIAGNOSIS — E78.2 MIXED HYPERLIPIDEMIA: ICD-10-CM

## 2023-08-16 LAB
25(OH)D3+25(OH)D2 SERPL-MCNC: 61.4 NG/ML (ref 30–100)
ALBUMIN SERPL-MCNC: 4.9 G/DL (ref 3.5–5.2)
ALBUMIN/CREAT UR: 15 MG/G CREAT (ref 0–29)
ALBUMIN/GLOB SERPL: 2.9 G/DL
ALP SERPL-CCNC: 78 U/L (ref 39–117)
ALT SERPL-CCNC: 23 U/L (ref 1–33)
APPEARANCE UR: CLEAR
AST SERPL-CCNC: 22 U/L (ref 1–32)
BACTERIA #/AREA URNS HPF: ABNORMAL /HPF
BASOPHILS # BLD AUTO: 0.03 10*3/MM3 (ref 0–0.2)
BASOPHILS NFR BLD AUTO: 0.5 % (ref 0–1.5)
BILIRUB SERPL-MCNC: 0.5 MG/DL (ref 0–1.2)
BILIRUB UR QL STRIP: NEGATIVE
BUN SERPL-MCNC: 16 MG/DL (ref 8–23)
BUN/CREAT SERPL: 17.2 (ref 7–25)
CALCIUM SERPL-MCNC: 10 MG/DL (ref 8.6–10.5)
CASTS URNS MICRO: ABNORMAL
CHLORIDE SERPL-SCNC: 97 MMOL/L (ref 98–107)
CHOLEST SERPL-MCNC: 163 MG/DL (ref 0–200)
CO2 SERPL-SCNC: 28.4 MMOL/L (ref 22–29)
COLOR UR: YELLOW
CREAT SERPL-MCNC: 0.93 MG/DL (ref 0.57–1)
CREAT UR-MCNC: 73.2 MG/DL
EGFRCR SERPLBLD CKD-EPI 2021: 61.5 ML/MIN/1.73
EOSINOPHIL # BLD AUTO: 0.08 10*3/MM3 (ref 0–0.4)
EOSINOPHIL NFR BLD AUTO: 1.3 % (ref 0.3–6.2)
EPI CELLS #/AREA URNS HPF: ABNORMAL /HPF
ERYTHROCYTE [DISTWIDTH] IN BLOOD BY AUTOMATED COUNT: 11.9 % (ref 12.3–15.4)
GLOBULIN SER CALC-MCNC: 1.7 GM/DL
GLUCOSE SERPL-MCNC: 95 MG/DL (ref 65–99)
GLUCOSE UR QL STRIP: NEGATIVE
HCT VFR BLD AUTO: 41.4 % (ref 34–46.6)
HDLC SERPL-MCNC: 53 MG/DL (ref 40–60)
HGB BLD-MCNC: 14.1 G/DL (ref 12–15.9)
HGB UR QL STRIP: NEGATIVE
IMM GRANULOCYTES # BLD AUTO: 0.02 10*3/MM3 (ref 0–0.05)
IMM GRANULOCYTES NFR BLD AUTO: 0.3 % (ref 0–0.5)
KETONES UR QL STRIP: NEGATIVE
LDLC SERPL CALC-MCNC: 85 MG/DL (ref 0–100)
LEUKOCYTE ESTERASE UR QL STRIP: ABNORMAL
LYMPHOCYTES # BLD AUTO: 1.56 10*3/MM3 (ref 0.7–3.1)
LYMPHOCYTES NFR BLD AUTO: 24.8 % (ref 19.6–45.3)
MCH RBC QN AUTO: 30.1 PG (ref 26.6–33)
MCHC RBC AUTO-ENTMCNC: 34.1 G/DL (ref 31.5–35.7)
MCV RBC AUTO: 88.5 FL (ref 79–97)
MICROALBUMIN UR-MCNC: 11.1 UG/ML
MONOCYTES # BLD AUTO: 0.7 10*3/MM3 (ref 0.1–0.9)
MONOCYTES NFR BLD AUTO: 11.1 % (ref 5–12)
NEUTROPHILS # BLD AUTO: 3.91 10*3/MM3 (ref 1.7–7)
NEUTROPHILS NFR BLD AUTO: 62 % (ref 42.7–76)
NITRITE UR QL STRIP: NEGATIVE
NRBC BLD AUTO-RTO: 0 /100 WBC (ref 0–0.2)
PH UR STRIP: 6 [PH] (ref 5–8)
PLATELET # BLD AUTO: 180 10*3/MM3 (ref 140–450)
POTASSIUM SERPL-SCNC: 4.2 MMOL/L (ref 3.5–5.2)
PROT SERPL-MCNC: 6.6 G/DL (ref 6–8.5)
PROT UR QL STRIP: NEGATIVE
RBC # BLD AUTO: 4.68 10*6/MM3 (ref 3.77–5.28)
RBC #/AREA URNS HPF: ABNORMAL /HPF
SODIUM SERPL-SCNC: 136 MMOL/L (ref 136–145)
SP GR UR STRIP: 1.01 (ref 1–1.03)
TRIGL SERPL-MCNC: 143 MG/DL (ref 0–150)
TSH SERPL DL<=0.005 MIU/L-ACNC: 2.41 UIU/ML (ref 0.27–4.2)
UROBILINOGEN UR STRIP-MCNC: ABNORMAL MG/DL
VLDLC SERPL CALC-MCNC: 25 MG/DL (ref 5–40)
WBC # BLD AUTO: 6.3 10*3/MM3 (ref 3.4–10.8)
WBC #/AREA URNS HPF: ABNORMAL /HPF

## 2023-08-18 ENCOUNTER — OFFICE VISIT (OUTPATIENT)
Dept: INTERNAL MEDICINE | Facility: CLINIC | Age: 83
End: 2023-08-18
Payer: MEDICARE

## 2023-08-18 VITALS
HEIGHT: 62 IN | SYSTOLIC BLOOD PRESSURE: 142 MMHG | WEIGHT: 140 LBS | TEMPERATURE: 97.7 F | BODY MASS INDEX: 25.76 KG/M2 | DIASTOLIC BLOOD PRESSURE: 72 MMHG | OXYGEN SATURATION: 96 % | HEART RATE: 66 BPM

## 2023-08-18 DIAGNOSIS — Z00.00 MEDICARE ANNUAL WELLNESS VISIT, SUBSEQUENT: Primary | ICD-10-CM

## 2023-08-18 DIAGNOSIS — I70.0 ATHEROSCLEROSIS OF ABDOMINAL AORTA: ICD-10-CM

## 2023-08-18 DIAGNOSIS — I10 BENIGN ESSENTIAL HYPERTENSION: ICD-10-CM

## 2023-08-18 DIAGNOSIS — Z00.00 ANNUAL PHYSICAL EXAM: ICD-10-CM

## 2023-08-18 DIAGNOSIS — Z85.51 PERSONAL HISTORY OF BLADDER CANCER: ICD-10-CM

## 2023-08-18 DIAGNOSIS — M85.89 OSTEOPENIA OF MULTIPLE SITES: ICD-10-CM

## 2023-08-18 DIAGNOSIS — Z91.81 RISK FOR FALLS: ICD-10-CM

## 2023-08-18 DIAGNOSIS — E78.2 MIXED HYPERLIPIDEMIA: ICD-10-CM

## 2023-08-18 DIAGNOSIS — E55.9 VITAMIN D DEFICIENCY: ICD-10-CM

## 2023-08-18 DIAGNOSIS — H16.9: Chronic | ICD-10-CM

## 2023-08-18 RX ORDER — OMEGA-3-ACID ETHYL ESTERS 1 G/1
2 CAPSULE, LIQUID FILLED ORAL NIGHTLY
Qty: 180 CAPSULE | Refills: 1 | Status: SHIPPED | OUTPATIENT
Start: 2023-08-18

## 2023-08-18 NOTE — ASSESSMENT & PLAN NOTE
She will continue taking losartan, hydrochlorothiazide, and metoprolol. She will let us know if the blood pressure is running closer to 150 most of the time. If so, we can increase the losartan to 100 mg per day instead of 50 mg per day. She will continue to avoid salt in the diet. She will continue with regular walking outside or on the treadmill.

## 2023-08-18 NOTE — PATIENT INSTRUCTIONS
Patient Instructions  Problem List Items Addressed This Visit          Advance Directives and General Issues    Risk for falls    Current Assessment & Plan     She will continue with regular exercise and walking. Continue to stay well hydrated.            Cardiac and Vasculature    Benign essential hypertension    Overview     Taking losartan and hydrochlorothiazide and metoprolol.          Current Assessment & Plan     She will continue taking losartan, hydrochlorothiazide, and metoprolol. She will let us know if the blood pressure is running closer to 150 most of the time. If so, we can increase the losartan to 100 mg per day instead of 50 mg per day. She will continue to avoid salt in the diet. She will continue with regular walking outside or on the treadmill.           Relevant Medications    losartan (COZAAR) 50 MG tablet    metoprolol succinate XL (TOPROL-XL) 50 MG 24 hr tablet    hydroCHLOROthiazide (HYDRODIURIL) 12.5 MG tablet    Other Relevant Orders    CBC & Differential (Completed)    Comprehensive Metabolic Panel (Completed)    Microalbumin / Creatinine Urine Ratio - Urine, Clean Catch (Completed)    Urinalysis With Microscopic - Urine, Clean Catch (Completed)    Mixed hyperlipidemia    Overview     Taking simvastatin and lovaza.         Current Assessment & Plan     She will continue taking simvastatin every evening. Continue eating a low-fat diet. She will continue taking 2 of the omega-3 acid ethyl esters (Lovaza) every evening.         Relevant Medications    simvastatin (ZOCOR) 20 MG tablet    omega-3 acid ethyl esters (LOVAZA) 1 g capsule    Other Relevant Orders    Lipid Panel (Completed)    TSH (Completed)    Atherosclerosis of abdominal aorta    Overview     Taking daily coated 81mg aspirin.         Current Assessment & Plan     She will continue taking 81 mg coated aspirin daily and will continue taking the statin nightly.           Relevant Medications    aspirin 81 MG tablet       Endocrine  and Metabolic    Vitamin D deficiency    Overview     Taking vitamin D3 and calcium.         Current Assessment & Plan     She will continue taking the current dose of vitamin D3 and calcium.           Relevant Orders    Vitamin D,25-Hydroxy (Completed)       Eye    Corneal inflammation, bilateral (Chronic)    Overview     Has taken VALACYCLOVIR          Current Assessment & Plan     She is considering corneal transplant, which was recommended by her ophthalmologist.         Relevant Medications    sodium chloride (KARRIE 128) 5 % ophthalmic solution    timolol (TIMOPTIC-XR) 0.5 % ophthalmic gel-forming    travoprost, BRANDYN free, (TRAVATAN) 0.004 % solution ophthalmic solution       Health Encounters    Annual physical exam    Current Assessment & Plan     She is up to date on immunizations except for the influenza vaccine, which she will receive in 09/2023. She will wait until later this fall for the newest COVID-19 booster to come out before she receives that. She is up to date on her DEXA scan and mammogram.         Medicare annual wellness visit, subsequent - Primary    Overview                Current Assessment & Plan     She is up to date on immunizations except for the influenza vaccine, which she will receive in 09/2023. She will wait until later this fall for the newest COVID-19 booster to come out before she receives that. She is up to date on her DEXA scan and mammogram.            Hematology and Neoplasia    Personal history of bladder cancer    Overview     Dr. Giles. Yearly cystoscopy every December.          Current Assessment & Plan     She will continue regular follow-up with Dr. Giles.            Musculoskeletal and Injuries    Osteopenia of multiple sites    Overview     DEXA 11/1/2021 showed a decrease in T-scores in the spine, but it is still in the osteopenia range.  T score in the femoral neck is -1.3 which is also in the mild osteopenia range.             Current Assessment & Plan     She will  continue with daily walking. She will add small hand weights at home while watching TV. Continue taking vitamin D3 and calcium.             Fall Prevention in the Home, Adult  Falls can cause injuries and affect people of all ages. There are many simple things that you can do to make your home safe and to help prevent falls. Ask for help when making these changes, if needed.  What actions can I take to prevent falls?  General instructions  Use good lighting in all rooms. Replace any light bulbs that burn out, turn on lights if it is dark, and use night-lights.  Place frequently used items in easy-to-reach places. Lower the shelves around your home if necessary.  Set up furniture so that there are clear paths around it. Avoid moving your furniture around.  Remove throw rugs and other tripping hazards from the floor.  Avoid walking on wet floors.  Fix any uneven floor surfaces.  Add color or contrast paint or tape to grab bars and handrails in your home. Place contrasting color strips on the first and last steps of staircases.  When you use a stepladder, make sure that it is completely opened and that the sides and supports are firmly locked. Have someone hold the ladder while you are using it. Do not climb a closed stepladder.  Know where your pets are when moving through your home.  What can I do in the bathroom?         Keep the floor dry. Immediately clean up any water that is on the floor.  Remove soap buildup in the tub or shower regularly.  Use nonskid mats or decals on the floor of the tub or shower.  Attach bath mats securely with double-sided, nonslip rug tape.  If you need to sit down while you are in the shower, use a plastic, nonslip stool.  Install grab bars by the toilet and in the tub and shower. Do not use towel bars as grab bars.  What can I do in the bedroom?  Make sure that a bedside light is easy to reach.  Do not use oversized bedding that reaches the floor.  Have a firm chair that has side arms  to use for getting dressed.  What can I do in the kitchen?  Clean up any spills right away.  If you need to reach for something above you, use a sturdy step stool that has a grab bar.  Keep electrical cables out of the way.  Do not use floor polish or wax that makes floors slippery. If you must use wax, make sure that it is non-skid floor wax.  What can I do with my stairs?  Do not leave any items on the stairs.  Make sure that you have a light switch at the top and the bottom of the stairs. Have them installed if you do not have them.  Make sure that there are handrails on both sides of the stairs. Fix handrails that are broken or loose. Make sure that handrails are as long as the staircases.  Install non-slip stair treads on all stairs in your home.  Avoid having throw rugs at the top or bottom of stairs, or secure the rugs with carpet tape to prevent them from moving.  Choose a carpet design that does not hide the edge of steps on the stairs.  Check any carpeting to make sure that it is firmly attached to the stairs. Fix any carpet that is loose or worn.  What can I do on the outside of my home?  Use bright outdoor lighting.  Regularly repair the edges of walkways and driveways and fix any cracks.  Remove high doorway thresholds.  Trim any shrubbery on the main path into your home.  Regularly check that handrails are securely fastened and in good repair. Both sides of all steps should have handrails.  Install guardrails along the edges of any raised decks or porches.  Clear walkways of debris and clutter, including tools and rocks.  Have leaves, snow, and ice cleared regularly.  Use sand or salt on walkways during winter months.  In the garage, clean up any spills right away, including grease or oil spills.  What other actions can I take?  Wear closed-toe shoes that fit well and support your feet. Wear shoes that have rubber soles or low heels.  Use mobility aids as needed, such as canes, walkers, scooters, and  crutches.  Review your medicines with your health care provider. Some medicines can cause dizziness or changes in blood pressure, which increase your risk of falling.  Talk with your health care provider about other ways that you can decrease your risk of falls. This may include working with a physical therapist or  to improve your strength, balance, and endurance.  Where to find more information  Centers for Disease Control and Prevention, [place]: www.cdc.gov  [place] [employer]: www.rajeev.nih.gov  Contact a health care provider if:  You are afraid of falling at home.  You feel weak, drowsy, or dizzy at home.  You fall at home.  Summary  There are many simple things that you can do to make your home safe and to help prevent falls.  Ways to make your home safe include removing tripping hazards and installing grab bars in the bathroom.  Ask for help when making these changes in your home.  This information is not intended to replace advice given to you by your health care provider. Make sure you discuss any questions you have with your health care provider.  Document Revised: 09/19/2022 Document Reviewed: 07/21/2021  Elsevier Patient Education c 2023 [employer]

## 2023-08-18 NOTE — ASSESSMENT & PLAN NOTE
She is up to date on immunizations except for the influenza vaccine, which she will receive in 09/2023. She will wait until later this fall for the newest COVID-19 booster to come out before she receives that. She is up to date on her DEXA scan and mammogram.

## 2023-08-18 NOTE — ASSESSMENT & PLAN NOTE
She will continue with daily walking. She will add small hand weights at home while watching TV. Continue taking vitamin D3 and calcium.

## 2023-08-18 NOTE — PROGRESS NOTES
The ABCs of the Annual Wellness Visit  Initial Medicare Wellness Visit    Chief Complaint   Patient presents with    Medicare Wellness-subsequent    Hypertension       Subjective   History of Present Illness:    The patient presents for an Annual Wellness Visit.    Fall risk  The patient denies any falls.    Appetite  The patient does not have as good of an appetite as she used to. She eats 3 meals a day most of the time. The patient does not always have protein in each meal. She eats cereal for breakfast. The patient eats eggs 1 time a week. She does not eat a lot of vegetables.    Hypertension  The patient's blood pressure today was 142/72 mmHg. Her blood pressure at home varies. Sometimes it is a systolic of 135 to 150 mmHg. It is more like a systolic of 140 mmHg most of the time. If it gets too low, she does not have any energy. She does not want to be lying on the couch all night. The patient walked every morning this week. She also has a treadmill. Her blood pressure gets up to a systolic of 150 mmHg 1 time a week. She tries not to add salt to her diet. The patient does not eat chips and crackers very much. She does not drink any sodas, but she does drink iced tea.    Hyperlipidemia  The patient is taking simvastatin for her cholesterol. She denies any side effects with any of her medications. The patient is taking 2 omega-3 acid ethyl esters at night. Her triglycerides are good at 143 mg/dL. Her HDL is exceptionally good. Her LDL is good at 85 mg/dL.    Cornea transplant surgery  The patient is thinking of having cornea transplant surgery on her right eye. She can get a hold of Dr. Nguyen, but they have terrible scheduling. The patient will stay off her aspirin for 5 days before they do the surgery.    Bladder prolapse  The patient will eventually need to have a bladder prolapse repair. She sees Dr. Celis.    Osteopenia  The patient is taking vitamin D and calcium for her bone density. Her DEXA scan from 2  years ago showed very mild osteopenia. The patient has hand weights, but she is not very good at using them.    Health maintenance  The patient is up to date on other vaccinations. She will receive the influenza vaccine in 2023 and will receive the new COVID-19 booster later this fall. She had 2 diagnostic mammograms done due to dense breast tissue. They found some calcifications in her breasts. She had a regular mammogram in 2022. Her next one is scheduled for 2023. She has had some pain in her left breast.      CHRONIC CONDITIONS:    HEALTH RISK ASSESSMENT    Recent Hospitalizations:  She was not admitted to the hospital during the last year.       Current Medical Providers:  Patient Care Team:  Marie Soto MD as PCP - General  Marie Soto MD as PCP - Family Medicine  Holly Pierce MD as Consulting Physician (Ophthalmology)  Kenneth Giles MD as Consulting Physician (Urology)  Radha Celis MD as Consulting Physician (Obstetrics and Gynecology)    Smoking Status:  Social History     Tobacco Use   Smoking Status Never   Smokeless Tobacco Never       Alcohol Consumption:  Social History     Substance and Sexual Activity   Alcohol Use Not Currently       Depression Screen:       2023    10:33 AM   PHQ-2/PHQ-9 Depression Screening   Little Interest or Pleasure in Doing Things 0-->not at all   Feeling Down, Depressed or Hopeless 0-->not at all   PHQ-9: Brief Depression Severity Measure Score 0       Fall Risk Screen:  RONALDO Fall Risk Assessment was completed, and patient is at LOW risk for falls.Assessment completed on:2023    Health Habits and Functional and Cognitive Screenin/18/2023    10:32 AM   Functional & Cognitive Status   Do you have difficulty preparing food and eating? No   Do you have difficulty bathing yourself, getting dressed or grooming yourself? No   Do you have difficulty using the toilet? No   Do you have difficulty moving around  from place to place? No   Do you have trouble with steps or getting out of a bed or a chair? No   Current Diet Well Balanced Diet   Dental Exam Up to date   Eye Exam Not up to date        Eye Exam Comment Last seen 8/22/22   Exercise (times per week) 5 times per week   Current Exercises Include Walking   Do you need help using the phone?  Yes   Are you deaf or do you have serious difficulty hearing?  Yes   Do you need help to go to places out of walking distance? No   Do you need help shopping? No   Do you need help preparing meals?  No   Do you need help with housework?  No   Do you need help with laundry? No   Do you need help taking your medications? No   Do you need help managing money? No   Do you ever drive or ride in a car without wearing a seat belt? No   Have you felt unusual stress, anger or loneliness in the last month? No   Who do you live with? Alone   If you need help, do you have trouble finding someone available to you? No   Have you been bothered in the last four weeks by sexual problems? No   Do you have difficulty concentrating, remembering or making decisions? No         Age-appropriate Screening Schedule:  Refer to the list below for future screening recommendations based on patient's age, sex and/or medical conditions. Orders for these recommended tests are listed in the plan section. The patient has been provided with a written plan.    Health Maintenance   Topic Date Due    HEMOGLOBIN A1C  Never done    COVID-19 Vaccine (5 - Moderna series) 08/17/2022    ANNUAL WELLNESS VISIT  08/16/2023    INFLUENZA VACCINE  10/01/2023    DXA SCAN  10/29/2023    LIPID PANEL  08/15/2024    URINE MICROALBUMIN  08/15/2024    TDAP/TD VACCINES (3 - Td or Tdap) 05/14/2030    Pneumococcal Vaccine 65+  Completed    ZOSTER VACCINE  Completed    DIABETIC EYE EXAM  Discontinued        The following portions of the patient's history were reviewed and updated as appropriate: allergies, current medications, past family  history, past medical history, past social history, past surgical history, and problem list.    Does the patient have evidence of cognitive impairment? No    Aspirin is on active medication list. Aspirin use is indicated based on review of current medical condition/s. Pros and cons of this therapy have been discussed today. Benefits of this medication outweigh potential harm.  Patient has been encouraged to continue taking this medication.  .      Outpatient Medications Prior to Visit   Medication Sig Dispense Refill    amoxicillin (AMOXIL) 500 MG capsule TAKE 4 CAPSULES BY MOUTH AS A SINGLE DOSE 1 HOUR BEFORE DENTAL APPOINTMENT      aspirin 81 MG tablet Take 1 tablet by mouth Daily. 90 tablet 1    CALCIUM-VITAMIN D PO Take  by mouth Daily. 1 chewable in the morning and after dinner.      Cholecalciferol (VITAMIN D-3 PO) Take  by mouth Daily. Daily, except not in the summer      Coenzyme Q10 (CO Q 10) 100 MG capsule Take 1 capsule by mouth As Needed.      famotidine (PEPCID) 20 MG tablet Take 1 tablet by mouth 2 (Two) Times a Day. 180 tablet 1    hydroCHLOROthiazide (HYDRODIURIL) 12.5 MG tablet Take 1 tablet by mouth Daily. 90 tablet 1    losartan (COZAAR) 50 MG tablet Take 1 tablet by mouth Daily. 90 tablet 1    metoprolol succinate XL (TOPROL-XL) 50 MG 24 hr tablet Take 1 tablet by mouth 2 (Two) Times a Day. 180 tablet 1    Multiple Vitamins-Minerals (SENIOR MULTIVITAMIN PLUS PO) Take 1 tablet by mouth Daily.      simvastatin (ZOCOR) 20 MG tablet Take 1 tablet by mouth every night at bedtime. 90 tablet 1    sodium chloride (KARRIE 128) 5 % ophthalmic solution Apply 1 drop to eye(s) as directed by provider 2 (Two) Times a Day. Every morning and night      timolol (TIMOPTIC-XR) 0.5 % ophthalmic gel-forming Administer 1 drop to both eyes Every Night.      travoprost, BAK free, (TRAVATAN) 0.004 % solution ophthalmic solution Daily. Instill 1 drop into each eye once daily      omega-3 acid ethyl esters (LOVAZA) 1 g  "capsule Take 2 capsules by mouth Every Night. (Patient taking differently: Take 2 capsules by mouth Every Night. Uses OTC) 180 capsule 1    valACYclovir (VALTREX) 500 MG tablet Take 500 mg by mouth Daily. (Patient not taking: Reported on 8/18/2023)       No facility-administered medications prior to visit.       Patient Active Problem List   Diagnosis    Vitamin D deficiency    Personal history of bladder cancer    Benign essential hypertension    Allergic rhinitis    Open-angle glaucoma    Osteopenia of multiple sites    Pain of right hip joint    Risk for falls    Mixed hyperlipidemia    Bunion    Atherosclerosis of abdominal aorta    GERD without esophagitis    Other female genital prolapse    Stress incontinence of urine    Annual physical exam    Need for vaccination    Chronic right-sided low back pain with right-sided sciatica    Other fatigue    Medicare annual wellness visit, subsequent    Corneal inflammation, bilateral    Pyuria       Advanced Care Planning:  Advance Directive is not on file.  ACP discussion was held with the patient during this visit. Patient does not have an advance directive, information provided.    Review of Systems    Compared to one year ago, the patient feels her physical health is the same.  Compared to one year ago, the patient feels her mental health is the same.    Reviewed chart for potential of high risk medication in the elderly: yes  Reviewed chart for potential of harmful drug interactions in the elderly:yes    Objective         Vitals:    08/18/23 1028   BP: 142/72   BP Location: Left arm   Patient Position: Sitting   Cuff Size: Adult   Pulse: 66   Temp: 97.7 øF (36.5 øC)   TempSrc: Infrared   SpO2: 96%   Weight: 63.5 kg (140 lb)   Height: 157.5 cm (62.01\")   PainSc: 0-No pain          Body mass index is 25.6 kg/mý.  Discussed the patient's BMI with her. The BMI is in the acceptable range.    Physical Exam  Vitals and nursing note reviewed.   Constitutional:       " Appearance: She is well-developed.   Eyes:      Conjunctiva/sclera: Conjunctivae normal.      Pupils: Pupils are equal, round, and reactive to light.   Neck:      Thyroid: No thyromegaly.   Cardiovascular:      Rate and Rhythm: Normal rate and regular rhythm.      Heart sounds: Normal heart sounds. No murmur heard.     Comments: No edema.  Pulmonary:      Effort: Pulmonary effort is normal.      Breath sounds: Normal breath sounds. No wheezing.   Chest:   Breasts:     Right: No inverted nipple, mass, nipple discharge, skin change or tenderness.      Left: No inverted nipple, mass, nipple discharge, skin change or tenderness.   Abdominal:      General: Bowel sounds are normal. There is no distension.      Palpations: Abdomen is soft. There is no mass.      Tenderness: There is no abdominal tenderness.   Musculoskeletal:         General: No tenderness. Normal range of motion.      Cervical back: Normal range of motion and neck supple.   Lymphadenopathy:      Cervical: No cervical adenopathy.   Skin:     General: Skin is warm and dry.      Findings: No rash.   Neurological:      Mental Status: She is alert and oriented to person, place, and time.      Cranial Nerves: No cranial nerve deficit.      Sensory: No sensory deficit.      Coordination: Coordination normal.      Gait: Gait normal.   Psychiatric:         Speech: Speech normal.         Behavior: Behavior normal.         Thought Content: Thought content normal.         Judgment: Judgment normal.      Comments: Normal attention and normal mood.         ECG 12 Lead    Date/Time: 8/18/2023 6:05 PM  Performed by: Marie Soto MD  Authorized by: Marie Soto MD   Comparison: compared with previous ECG   Similar to previous ECG  Rhythm: sinus rhythm  Rate: normal  BPM: 62  Conduction: conduction normal  ST Segments: ST segments normal  T Waves: T waves normal  QRS axis: normal    Clinical impression: normal ECG        Lab Results   Component Value Date     CHLPL 163 08/15/2023    TRIG 143 08/15/2023    HDL 53 08/15/2023    LDL 85 08/15/2023    VLDL 25 08/15/2023        Assessment & Plan   Medicare Risks and Personalized Health Plan  CMS Preventative Services Quick Reference  Cardiovascular risk  Fall Risk  Osteoporosis Risk    The above risks/problems have been discussed with the patient.  Pertinent information has been shared with the patient in the After Visit Summary.  Follow up plans and orders are seen below in the Assessment/Plan Section.  Patient Instructions   Problem List Items Addressed This Visit          Advance Directives and General Issues    Risk for falls    Current Assessment & Plan     She will continue with regular exercise and walking. Continue to stay well hydrated.            Cardiac and Vasculature    Benign essential hypertension    Overview     Taking losartan and hydrochlorothiazide and metoprolol.          Current Assessment & Plan     She will continue taking losartan, hydrochlorothiazide, and metoprolol. She will let us know if the blood pressure is running closer to 150 most of the time. If so, we can increase the losartan to 100 mg per day instead of 50 mg per day. She will continue to avoid salt in the diet. She will continue with regular walking outside or on the treadmill.           Relevant Medications    losartan (COZAAR) 50 MG tablet    metoprolol succinate XL (TOPROL-XL) 50 MG 24 hr tablet    hydroCHLOROthiazide (HYDRODIURIL) 12.5 MG tablet    Other Relevant Orders    CBC & Differential (Completed)    Comprehensive Metabolic Panel (Completed)    Microalbumin / Creatinine Urine Ratio - Urine, Clean Catch (Completed)    Urinalysis With Microscopic - Urine, Clean Catch (Completed)    Mixed hyperlipidemia    Overview     Taking simvastatin and lovaza.         Current Assessment & Plan     She will continue taking simvastatin every evening. Continue eating a low-fat diet. She will continue taking 2 of the omega-3 acid ethyl esters  (Lovaza) every evening.         Relevant Medications    simvastatin (ZOCOR) 20 MG tablet    omega-3 acid ethyl esters (LOVAZA) 1 g capsule    Other Relevant Orders    Lipid Panel (Completed)    TSH (Completed)    Atherosclerosis of abdominal aorta    Overview     Taking daily coated 81mg aspirin.         Current Assessment & Plan     She will continue taking 81 mg coated aspirin daily and will continue taking the statin nightly.           Relevant Medications    aspirin 81 MG tablet       Endocrine and Metabolic    Vitamin D deficiency    Overview     Taking vitamin D3 and calcium.         Current Assessment & Plan     She will continue taking the current dose of vitamin D3 and calcium.           Relevant Orders    Vitamin D,25-Hydroxy (Completed)       Eye    Corneal inflammation, bilateral (Chronic)    Overview     Has taken VALACYCLOVIR          Current Assessment & Plan     She is considering corneal transplant, which was recommended by her ophthalmologist.         Relevant Medications    sodium chloride (KARRIE 128) 5 % ophthalmic solution    timolol (TIMOPTIC-XR) 0.5 % ophthalmic gel-forming    travoprost, BRANDYN free, (TRAVATAN) 0.004 % solution ophthalmic solution       Health Encounters    Annual physical exam    Current Assessment & Plan     She is up to date on immunizations except for the influenza vaccine, which she will receive in 09/2023. She will wait until later this fall for the newest COVID-19 booster to come out before she receives that. She is up to date on her DEXA scan and mammogram.         Medicare annual wellness visit, subsequent - Primary    Overview                Current Assessment & Plan     She is up to date on immunizations except for the influenza vaccine, which she will receive in 09/2023. She will wait until later this fall for the newest COVID-19 booster to come out before she receives that. She is up to date on her DEXA scan and mammogram.            Hematology and Neoplasia     Personal history of bladder cancer    Overview     Dr. Giles. Yearly cystoscopy every December.          Current Assessment & Plan     She will continue regular follow-up with Dr. Giles.            Musculoskeletal and Injuries    Osteopenia of multiple sites    Overview     DEXA 11/1/2021 showed a decrease in T-scores in the spine, but it is still in the osteopenia range.  T score in the femoral neck is -1.3 which is also in the mild osteopenia range.             Current Assessment & Plan     She will continue with daily walking. She will add small hand weights at home while watching TV. Continue taking vitamin D3 and calcium.               Follow Up:  Next Medicare Wellness visit to be scheduled in 1 year.      She will come back to see me in 6 months for a fasting follow-up.    An After Visit Summary and PPPS were given to the patient.     Follow Up     She will come back to see me in 6 months for a fasting follow-up.    Patient was given instructions and counseling regarding her condition or for health maintenance advice. Please see specific information pulled into the AVS if appropriate.     Transcribed from ambient dictation for Marie Soto MD by Josefa Meza.  08/18/23   12:38 EDT    Patient or patient representative verbalized consent to the visit recording.  I have personally performed the services described in this document as transcribed by the above individual, and it is both accurate and complete.

## 2023-08-18 NOTE — ASSESSMENT & PLAN NOTE
She will continue taking 81 mg coated aspirin daily and will continue taking the statin nightly.

## 2023-08-18 NOTE — ASSESSMENT & PLAN NOTE
She will continue taking simvastatin every evening. Continue eating a low-fat diet. She will continue taking 2 of the omega-3 acid ethyl esters (Lovaza) every evening.

## 2023-09-01 RX ORDER — LOSARTAN POTASSIUM 50 MG/1
TABLET ORAL
Qty: 90 TABLET | Refills: 1 | Status: SHIPPED | OUTPATIENT
Start: 2023-09-01

## 2023-09-01 NOTE — TELEPHONE ENCOUNTER
Rx Refill Note  Requested Prescriptions     Pending Prescriptions Disp Refills    losartan (COZAAR) 50 MG tablet [Pharmacy Med Name: LOSARTAN POTASSIUM 50 MG TAB] 90 tablet 1     Sig: TAKE ONE TABLET BY MOUTH DAILY      Last office visit with prescribing clinician: 8/18/2023   Last telemedicine visit with prescribing clinician: Visit date not found   Next office visit with prescribing clinician: 3/4/2024                         Would you like a call back once the refill request has been completed: [] Yes [] No    If the office needs to give you a call back, can they leave a voicemail: [] Yes [] No    Sonali Donnelly LPN  09/01/23, 08:05 EDT

## 2023-09-11 DIAGNOSIS — K21.9 GERD WITHOUT ESOPHAGITIS: ICD-10-CM

## 2023-09-11 DIAGNOSIS — I10 BENIGN ESSENTIAL HYPERTENSION: ICD-10-CM

## 2023-09-11 DIAGNOSIS — E78.2 MIXED HYPERLIPIDEMIA: ICD-10-CM

## 2023-09-11 RX ORDER — METOPROLOL SUCCINATE 50 MG/1
50 TABLET, EXTENDED RELEASE ORAL 2 TIMES DAILY
Qty: 180 TABLET | Refills: 1 | Status: SHIPPED | OUTPATIENT
Start: 2023-09-11

## 2023-09-11 RX ORDER — HYDROCHLOROTHIAZIDE 12.5 MG/1
12.5 TABLET ORAL DAILY
Qty: 90 TABLET | Refills: 1 | Status: SHIPPED | OUTPATIENT
Start: 2023-09-11

## 2023-09-11 RX ORDER — FAMOTIDINE 20 MG/1
20 TABLET, FILM COATED ORAL 2 TIMES DAILY
Qty: 180 TABLET | Refills: 1 | Status: SHIPPED | OUTPATIENT
Start: 2023-09-11

## 2023-09-11 RX ORDER — SIMVASTATIN 20 MG
20 TABLET ORAL
Qty: 90 TABLET | Refills: 1 | Status: SHIPPED | OUTPATIENT
Start: 2023-09-11

## 2023-09-11 NOTE — TELEPHONE ENCOUNTER
Rx Refill Note  Requested Prescriptions     Pending Prescriptions Disp Refills    famotidine (PEPCID) 20 MG tablet 180 tablet 1     Sig: Take 1 tablet by mouth 2 (Two) Times a Day.    simvastatin (ZOCOR) 20 MG tablet 90 tablet 1     Sig: Take 1 tablet by mouth every night at bedtime.    metoprolol succinate XL (TOPROL-XL) 50 MG 24 hr tablet 180 tablet 1     Sig: Take 1 tablet by mouth 2 (Two) Times a Day.    hydroCHLOROthiazide (HYDRODIURIL) 12.5 MG tablet 90 tablet 1     Sig: Take 1 tablet by mouth Daily.      Last office visit with prescribing clinician: 8/18/2023   Last telemedicine visit with prescribing clinician: Visit date not found   Next office visit with prescribing clinician: 3/4/2024                         Would you like a call back once the refill request has been completed: [] Yes [] No    If the office needs to give you a call back, can they leave a voicemail: [] Yes [] No    Sergio Chaves MA  09/11/23, 08:56 EDT

## 2023-09-14 RX ORDER — OMEGA-3-ACID ETHYL ESTERS 1 G/1
2 CAPSULE, LIQUID FILLED ORAL NIGHTLY
Qty: 180 CAPSULE | Refills: 1 | Status: SHIPPED | OUTPATIENT
Start: 2023-09-14

## 2023-09-21 DIAGNOSIS — I10 BENIGN ESSENTIAL HYPERTENSION: ICD-10-CM

## 2023-09-21 RX ORDER — HYDROCHLOROTHIAZIDE 12.5 MG/1
12.5 TABLET ORAL DAILY
Qty: 90 TABLET | Refills: 1 | Status: SHIPPED | OUTPATIENT
Start: 2023-09-21

## 2023-10-06 DIAGNOSIS — E78.2 MIXED HYPERLIPIDEMIA: ICD-10-CM

## 2023-10-09 RX ORDER — SIMVASTATIN 20 MG
20 TABLET ORAL
Qty: 90 TABLET | Refills: 1 | Status: SHIPPED | OUTPATIENT
Start: 2023-10-09

## 2023-10-09 RX ORDER — METOPROLOL SUCCINATE 50 MG/1
50 TABLET, EXTENDED RELEASE ORAL 2 TIMES DAILY
Qty: 180 TABLET | Refills: 1 | Status: SHIPPED | OUTPATIENT
Start: 2023-10-09

## 2023-10-09 NOTE — TELEPHONE ENCOUNTER
Rx Refill Note  Requested Prescriptions     Pending Prescriptions Disp Refills    simvastatin (ZOCOR) 20 MG tablet 90 tablet 1     Sig: Take 1 tablet by mouth every night at bedtime.    metoprolol succinate XL (TOPROL-XL) 50 MG 24 hr tablet 180 tablet 1     Sig: Take 1 tablet by mouth 2 (Two) Times a Day.      Last office visit with prescribing clinician: Visit date not found   Last telemedicine visit with prescribing clinician: Visit date not found   Next office visit with prescribing clinician: Visit date not found                         Would you like a call back once the refill request has been completed: [] Yes [] No    If the office needs to give you a call back, can they leave a voicemail: [] Yes [] No    Sonali Donnelly LPN  10/09/23, 09:27 EDT

## 2023-11-13 DIAGNOSIS — I10 BENIGN ESSENTIAL HYPERTENSION: ICD-10-CM

## 2023-11-13 RX ORDER — HYDROCHLOROTHIAZIDE 12.5 MG/1
12.5 TABLET ORAL DAILY
Qty: 90 TABLET | Refills: 1 | Status: SHIPPED | OUTPATIENT
Start: 2023-11-13

## 2023-12-06 ENCOUNTER — HOSPITAL ENCOUNTER (OUTPATIENT)
Dept: MAMMOGRAPHY | Facility: HOSPITAL | Age: 83
Discharge: HOME | End: 2023-12-06
Admitting: INTERNAL MEDICINE
Payer: MEDICARE

## 2023-12-06 DIAGNOSIS — Z12.31 BREAST CANCER SCREENING BY MAMMOGRAM: ICD-10-CM

## 2023-12-06 PROCEDURE — 77067 SCR MAMMO BI INCL CAD: CPT

## 2023-12-06 PROCEDURE — 77063 BREAST TOMOSYNTHESIS BI: CPT

## 2024-02-19 ENCOUNTER — HOSPITAL ENCOUNTER (EMERGENCY)
Facility: HOSPITAL | Age: 84
Discharge: HOME | End: 2024-02-19
Attending: EMERGENCY MEDICINE
Payer: MEDICARE

## 2024-02-19 ENCOUNTER — APPOINTMENT (OUTPATIENT)
Dept: GENERAL RADIOLOGY | Facility: HOSPITAL | Age: 84
End: 2024-02-19
Payer: MEDICARE

## 2024-02-19 VITALS
BODY MASS INDEX: 25.76 KG/M2 | TEMPERATURE: 98 F | SYSTOLIC BLOOD PRESSURE: 146 MMHG | HEART RATE: 67 BPM | DIASTOLIC BLOOD PRESSURE: 69 MMHG | RESPIRATION RATE: 18 BRPM | WEIGHT: 140 LBS | HEIGHT: 62 IN | OXYGEN SATURATION: 94 %

## 2024-02-19 DIAGNOSIS — R55 SYNCOPE, UNSPECIFIED SYNCOPE TYPE: ICD-10-CM

## 2024-02-19 DIAGNOSIS — N39.0 ACUTE UTI: ICD-10-CM

## 2024-02-19 DIAGNOSIS — I16.0 HYPERTENSIVE URGENCY: Primary | ICD-10-CM
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
## 2024-02-19 LAB
ALBUMIN SERPL-MCNC: 4.7 G/DL (ref 3.5–5.2)
ALBUMIN/GLOB SERPL: 1.7 G/DL
ALP SERPL-CCNC: 81 U/L (ref 39–117)
ALT SERPL W P-5'-P-CCNC: 24 U/L (ref 1–33)
ANION GAP SERPL CALCULATED.3IONS-SCNC: 9 MMOL/L (ref 5–15)
AST SERPL-CCNC: 23 U/L (ref 1–32)
BACTERIA UR QL AUTO: ABNORMAL /HPF
BASOPHILS # BLD AUTO: 0.02 10*3/MM3 (ref 0–0.2)
BASOPHILS NFR BLD AUTO: 0.4 % (ref 0–1.5)
BILIRUB SERPL-MCNC: 0.4 MG/DL (ref 0–1.2)
BILIRUB UR QL STRIP: NEGATIVE
BUN SERPL-MCNC: 16 MG/DL (ref 8–23)
BUN/CREAT SERPL: 22.9 (ref 7–25)
CALCIUM SPEC-SCNC: 10.1 MG/DL (ref 8.6–10.5)
CHLORIDE SERPL-SCNC: 97 MMOL/L (ref 98–107)
CLARITY UR: CLEAR
CO2 SERPL-SCNC: 30 MMOL/L (ref 22–29)
COLOR UR: YELLOW
CREAT SERPL-MCNC: 0.7 MG/DL (ref 0.57–1)
DEPRECATED RDW RBC AUTO: 40.4 FL (ref 37–54)
EGFRCR SERPLBLD CKD-EPI 2021: 86.5 ML/MIN/1.73
EOSINOPHIL # BLD AUTO: 0.06 10*3/MM3 (ref 0–0.4)
EOSINOPHIL NFR BLD AUTO: 1.1 % (ref 0.3–6.2)
ERYTHROCYTE [DISTWIDTH] IN BLOOD BY AUTOMATED COUNT: 12.2 % (ref 12.3–15.4)
GLOBULIN UR ELPH-MCNC: 2.7 GM/DL
GLUCOSE SERPL-MCNC: 100 MG/DL (ref 65–99)
GLUCOSE UR STRIP-MCNC: NEGATIVE MG/DL
HCT VFR BLD AUTO: 45.8 % (ref 34–46.6)
HGB BLD-MCNC: 15.2 G/DL (ref 12–15.9)
HGB UR QL STRIP.AUTO: ABNORMAL
HOLD SPECIMEN: NORMAL
HYALINE CASTS UR QL AUTO: ABNORMAL /LPF
IMM GRANULOCYTES # BLD AUTO: 0.02 10*3/MM3 (ref 0–0.05)
IMM GRANULOCYTES NFR BLD AUTO: 0.4 % (ref 0–0.5)
KETONES UR QL STRIP: NEGATIVE
LEUKOCYTE ESTERASE UR QL STRIP.AUTO: ABNORMAL
LYMPHOCYTES # BLD AUTO: 1.26 10*3/MM3 (ref 0.7–3.1)
LYMPHOCYTES NFR BLD AUTO: 24 % (ref 19.6–45.3)
MAGNESIUM SERPL-MCNC: 2 MG/DL (ref 1.6–2.4)
MCH RBC QN AUTO: 29.9 PG (ref 26.6–33)
MCHC RBC AUTO-ENTMCNC: 33.2 G/DL (ref 31.5–35.7)
MCV RBC AUTO: 90.2 FL (ref 79–97)
MONOCYTES # BLD AUTO: 0.47 10*3/MM3 (ref 0.1–0.9)
MONOCYTES NFR BLD AUTO: 8.9 % (ref 5–12)
NEUTROPHILS NFR BLD AUTO: 3.43 10*3/MM3 (ref 1.7–7)
NEUTROPHILS NFR BLD AUTO: 65.2 % (ref 42.7–76)
NITRITE UR QL STRIP: NEGATIVE
NRBC BLD AUTO-RTO: 0 /100 WBC (ref 0–0.2)
PH UR STRIP.AUTO: 7 [PH] (ref 5–8)
PLATELET # BLD AUTO: 183 10*3/MM3 (ref 140–450)
PMV BLD AUTO: 9.9 FL (ref 6–12)
POTASSIUM SERPL-SCNC: 3.9 MMOL/L (ref 3.5–5.2)
PROT SERPL-MCNC: 7.4 G/DL (ref 6–8.5)
PROT UR QL STRIP: NEGATIVE
RBC # BLD AUTO: 5.08 10*6/MM3 (ref 3.77–5.28)
RBC # UR STRIP: ABNORMAL /HPF
REF LAB TEST METHOD: ABNORMAL
SODIUM SERPL-SCNC: 136 MMOL/L (ref 136–145)
SP GR UR STRIP: 1.01 (ref 1–1.03)
SQUAMOUS #/AREA URNS HPF: ABNORMAL /HPF
TROPONIN T SERPL HS-MCNC: 8 NG/L
UROBILINOGEN UR QL STRIP: ABNORMAL
WBC # UR STRIP: ABNORMAL /HPF
WBC NRBC COR # BLD AUTO: 5.26 10*3/MM3 (ref 3.4–10.8)
WHOLE BLOOD HOLD COAG: NORMAL
WHOLE BLOOD HOLD SPECIMEN: NORMAL

## 2024-02-19 PROCEDURE — 71045 X-RAY EXAM CHEST 1 VIEW: CPT

## 2024-02-19 PROCEDURE — 84484 ASSAY OF TROPONIN QUANT: CPT | Performed by: EMERGENCY MEDICINE

## 2024-02-19 PROCEDURE — 81001 URINALYSIS AUTO W/SCOPE: CPT | Performed by: EMERGENCY MEDICINE

## 2024-02-19 PROCEDURE — 93005 ELECTROCARDIOGRAM TRACING: CPT | Performed by: EMERGENCY MEDICINE

## 2024-02-19 PROCEDURE — 83735 ASSAY OF MAGNESIUM: CPT | Performed by: EMERGENCY MEDICINE

## 2024-02-19 PROCEDURE — 80053 COMPREHEN METABOLIC PANEL: CPT | Performed by: EMERGENCY MEDICINE

## 2024-02-19 PROCEDURE — 85025 COMPLETE CBC W/AUTO DIFF WBC: CPT | Performed by: EMERGENCY MEDICINE

## 2024-02-19 PROCEDURE — 99284 EMERGENCY DEPT VISIT MOD MDM: CPT

## 2024-02-19 RX ORDER — CEFDINIR 300 MG/1
300 CAPSULE ORAL ONCE
Status: DISCONTINUED | OUTPATIENT
Start: 2024-02-20 | End: 2024-02-19 | Stop reason: HOSPADM

## 2024-02-19 RX ORDER — HYDRALAZINE HYDROCHLORIDE 20 MG/ML
10 INJECTION INTRAMUSCULAR; INTRAVENOUS ONCE
Status: DISCONTINUED | OUTPATIENT
Start: 2024-02-19 | End: 2024-02-19 | Stop reason: HOSPADM

## 2024-02-19 RX ORDER — CEFDINIR 300 MG/1
300 CAPSULE ORAL 2 TIMES DAILY
Qty: 14 CAPSULE | Refills: 0 | Status: SHIPPED | OUTPATIENT
Start: 2024-02-19 | End: 2024-03-04

## 2024-02-19 RX ORDER — SODIUM CHLORIDE 0.9 % (FLUSH) 0.9 %
10 SYRINGE (ML) INJECTION AS NEEDED
Status: DISCONTINUED | OUTPATIENT
Start: 2024-02-19 | End: 2024-02-19 | Stop reason: HOSPADM

## 2024-02-19 NOTE — ED PROVIDER NOTES
"Subjective   History of Present Illness  Patient is a pleasant 83 y.o. female who presents to the emergency department for evaluation after recording hypertensive episodes. This morning she was awaked by \"feeling unwell\" which prompted her to record her blood pressure using her home BP cuff. She recorded her BP over 3 separate occasions, all measuring in the 190s/90s.  She denies HA, abdominal pain, palpitations, dyspnea at this time. Past medical history is significant for hypertension which is controlled with PO metoprolol, HCTZ, and losartan. She states that she takes these medications as prescribed. Today, she has only taken one dose of the metoprolol prior to arrival at the ED. No vision changes.      Review of Systems   All other systems reviewed and are negative.      Past Medical History:   Diagnosis Date    Allergic     Bladder cancer 1990    Closed fracture of nasal bone with routine healing 05/22/2020 5/22/2020 Marie Soto MD  Pain from nasal fracture has improved.  She no longer needs to take Tylenol.  There is mild residual swelling in the nasal cavity.    Hyperlipidemia     Hypertension     Laceration of brow without complication 05/22/2020 5/22/2020 Marie Soto MD  Laceration of right brow due to fall due to syncope.    Stitches removed today.  Steri-Strips applied to a 3 mm area without good approximation.  The rest of the laceration is well-healed.  She was advised to stop using Neosporin and just use a little Vaseline on it daily.    Overweight with body mass index (BMI) of 26 to 26.9 in adult 02/04/2021 2/4/2021 Marie Soto MD  Mildly overweight BMI at 26.  Continue low-fat diet.  Avoid sugars and avoid snacking.  Increase exercise and physical activity.  Weigh every Monday morning to monitor.    Plantar fasciitis 2010    Rosacea     Traumatic black eye, initial encounter 05/22/2020 5/22/2020 Marie Soto MD  Bilateral delmy-orbital ecchymoses due to fall due to " syncope.    Vasovagal syncope     since childhood       Allergies   Allergen Reactions    Brimonidine Other (See Comments)     hypotension    Timolol Rash       Past Surgical History:   Procedure Laterality Date    CATARACT EXTRACTION Left 07/2022    ENDOMETRIAL BIOPSY  1983       Family History   Problem Relation Age of Onset    Coronary artery disease Mother     Hypertension Father     Coronary artery disease Brother     Hyperlipidemia Daughter     Obesity Maternal Grandmother     Mitral valve prolapse Daughter     Breast cancer Neg Hx     Ovarian cancer Neg Hx        Social History     Socioeconomic History    Marital status:    Tobacco Use    Smoking status: Never    Smokeless tobacco: Never   Vaping Use    Vaping Use: Never used   Substance and Sexual Activity    Alcohol use: Not Currently    Drug use: No    Sexual activity: Defer           Objective   Physical Exam  Vitals and nursing note reviewed.   Constitutional:       General: She is not in acute distress.     Appearance: Normal appearance.   HENT:      Head: Normocephalic and atraumatic.      Nose: Nose normal.      Mouth/Throat:      Mouth: Mucous membranes are moist.   Eyes:      Conjunctiva/sclera: Conjunctivae normal.      Pupils: Pupils are equal, round, and reactive to light.   Neck:      Thyroid: No thyromegaly.   Cardiovascular:      Rate and Rhythm: Normal rate and regular rhythm.      Heart sounds: Normal heart sounds. No murmur heard.     No friction rub. No gallop.   Pulmonary:      Effort: Pulmonary effort is normal. No respiratory distress.      Breath sounds: Normal breath sounds.   Abdominal:      General: Bowel sounds are normal.      Palpations: Abdomen is soft.      Tenderness: There is no abdominal tenderness.   Musculoskeletal:         General: Normal range of motion.      Cervical back: Normal range of motion.   Skin:     General: Skin is warm and dry.      Capillary Refill: Capillary refill takes less than 2 seconds.    Neurological:      General: No focal deficit present.      Mental Status: She is alert and oriented to person, place, and time.   Psychiatric:         Behavior: Behavior normal.         Procedures           ED Course  ED Course as of 02/22/24 1703   Mon Feb 19, 2024   1326 Workup reassuring.  Patient has a pleasant declines CT of the head as she states she did not hit her head when she fell and she is feeling fine today.  She understands risk of intracranial injury and has low threshold to return to the emergency department.  Workup reveals a UTI which could explain her generalized nonspecific feeling unwell this morning.  Blood pressure improved without intervention.  This is also without patient's second blood pressure medication which she would typically already taken this morning.  She will monitor her pressure at home and follow-up with the hypertension/syncope [facility].  Will give her antibiotics to take both today and at home and again patient and family understand have a low threshold to return to the emergency department if symptoms worsen or other concerns arise.  Patient prefers outpatient as opposed to inpatient treatment today. [CP]      ED Course User Index  [CP] Trae Soto DO          XR Chest 1 View   Final Result   Impression:   No acute process identified.         Electronically Signed: Alvino Blanchard MD     2/19/2024 8:16 AM EST     Workstation ID: [id number]        Vitals:    02/19/24 1207 02/19/24 1214 02/19/24 1230 02/19/24 1300   BP: 154/97 154/84 160/80 146/69   BP Location:       Patient Position:       Pulse: 75 67 65 67   Resp:       Temp:       TempSrc:       SpO2: 95% 95% 96% 94%   Weight:       Height:         Medications - No data to display  ECG/EMG Results (last 24 hours)       ** No results found for the last 24 hours. **          ECG 12 Lead ED Triage Standing Order; Weak / Dizzy / AMS   Final Result   Test Reason : ED Triage Standing Order~    Blood Pressure :   */*   mmHG   Vent. Rate :  64 BPM     Atrial Rate :  64 BPM      P-R Int : 162 ms          QRS Dur : 104 ms       QT Int : 390 ms       P-R-T Axes :  65  25  38 degrees      QTc Int : 402 ms      Normal sinus rhythm   Septal infarct , age undetermined   Abnormal ECG   When compared with ECG of 14-MAY-2020 11:44,   No significant change was found   Confirmed by MD VILLEGAS CORY ([id number]) on 2/21/2024 8:15:43 PM      Referred By: READER           Confirmed By: GLORIA VILLEGAS MD         Latest Reference Range & Units 02/19/24 08:19 02/19/24 10:11   HS Troponin T <14 ng/L 8    Sodium 136 - 145 mmol/L 136    Potassium 3.5 - 5.2 mmol/L 3.9    Chloride 98 - 107 mmol/L 97 (L)    CO2 22.0 - 29.0 mmol/L 30.0 (H)    Anion Gap 5.0 - 15.0 mmol/L 9.0    BUN 8 - 23 mg/dL 16    Creatinine 0.57 - 1.00 mg/dL 0.70    BUN/Creatinine Ratio 7.0 - 25.0  22.9    eGFR >60.0 mL/min/1.73 86.5    Glucose 65 - 99 mg/dL 100 (H)    Calcium 8.6 - 10.5 mg/dL 10.1    Magnesium 1.6 - 2.4 mg/dL 2.0    Alkaline Phosphatase 39 - 117 U/L 81    Total Protein 6.0 - 8.5 g/dL 7.4    Albumin 3.5 - 5.2 g/dL 4.7    Globulin gm/dL 2.7    A/G Ratio g/dL 1.7    AST (SGOT) 1 - 32 U/L 23    ALT (SGPT) 1 - 33 U/L 24    Total Bilirubin 0.0 - 1.2 mg/dL 0.4    WBC 3.40 - 10.80 10*3/mm3 5.26    RBC 3.77 - 5.28 10*6/mm3 5.08    Hemoglobin 12.0 - 15.9 g/dL 15.2    Hematocrit 34.0 - 46.6 % 45.8    Platelets 140 - 450 10*3/mm3 183    RDW 12.3 - 15.4 % 12.2 (L)    MCV 79.0 - 97.0 fL 90.2    MCH 26.6 - 33.0 pg 29.9    MCHC 31.5 - 35.7 g/dL 33.2    MPV 6.0 - 12.0 fL 9.9    RDW-SD 37.0 - 54.0 fl 40.4    Neutrophil Rel % 42.7 - 76.0 % 65.2    Lymphocyte Rel % 19.6 - 45.3 % 24.0    Monocyte Rel % 5.0 - 12.0 % 8.9    Eosinophil Rel % 0.3 - 6.2 % 1.1    Basophil Rel % 0.0 - 1.5 % 0.4    Immature Granulocyte Rel % 0.0 - 0.5 % 0.4    Neutrophils Absolute 1.70 - 7.00 10*3/mm3 3.43    Lymphocytes Absolute 0.70 - 3.10 10*3/mm3 1.26    Monocytes Absolute 0.10 - 0.90 10*3/mm3  0.47    Eosinophils Absolute 0.00 - 0.40 10*3/mm3 0.06    Basophils Absolute 0.00 - 0.20 10*3/mm3 0.02    Immature Grans, Absolute 0.00 - 0.05 10*3/mm3 0.02    nRBC 0.0 - 0.2 /100 WBC 0.0    Color, UA Yellow, Straw   Yellow   Appearance, UA Clear   Clear   Specific Gravity, UA 1.001 - 1.030   1.010   pH, UA 5.0 - 8.0   7.0   Glucose Negative   Negative   Ketones, UA Negative   Negative   Blood, UA Negative   Trace !   Nitrite, UA Negative   Negative   Leukocytes, UA Negative   Large (3+) !   Protein, UA Negative   Negative   Bilirubin, UA Negative   Negative   Urobilinogen, UA 0.2 - 1.0 E.U./dL   0.2 E.U./dL   RBC, UA None Seen, 0-2 /HPF  0-2   WBC, UA None Seen, 0-2 /HPF  21-50 !   Bacteria, UA None Seen, Trace /HPF  Trace   Squamous Epithelial Cells, UA None Seen, 0-2 /HPF  3-6 !   Hyaline Casts, UA 0 - 6 /LPF  None Seen   Methodology:   Automated Microscopy   (L): Data is abnormally low  (H): Data is abnormally high  !: Data is abnormal                                         Medical Decision Making  Problems Addressed:  Acute UTI: complicated acute illness or injury  Fall, initial encounter: complicated acute illness or injury  Hypertensive urgency: complicated acute illness or injury  Syncope, unspecified syncope type: complicated acute illness or injury    Amount and/or Complexity of Data Reviewed  External Data Reviewed: notes.  Labs: ordered. Decision-making details documented in ED Course.  Radiology: ordered and independent interpretation performed. Decision-making details documented in ED Course.  ECG/medicine tests: ordered and independent interpretation performed. Decision-making details documented in ED Course.    Risk  Prescription drug management.        Final diagnoses:   Hypertensive urgency   Acute UTI   Fall, initial encounter   Syncope, unspecified syncope type       ED Disposition  ED Disposition       ED Disposition   Discharge    Condition   Stable    Comment   --              DISCHARGE    Patient discharged in stable condition.    Reviewed implications of results, diagnosis, meds, responsibility to follow up, warning signs and symptoms of possible worsening, potential complications and reasons to return to ER.    Patient/Family voiced understanding of above instructions.    Discussed plan for discharge, as there is no emergent indication for admission.  Pt/family is agreeable and understands need for follow up and possible repeat testing.  Pt/family is aware that discharge does not mean that nothing is wrong but that it indicates no emergency is currently present that requires admission and they must continue care with follow-up as given below or with a physician of their choice.     FOLLOW-UP  Marie Soto MD  [address]  [place] [id number]  [phone]    Schedule an appointment as soon as possible for a visit       [facility] CARDIOLOGY  [address]  [facility] [id number] [phone]  Schedule an appointment as soon as possible for a visit       [facility] EMERGENCY DEPARTMENT  [address] [address] [phone]    If symptoms worsen         Medication List        New Prescriptions      cefdinir 300 MG capsule  Commonly known as: OMNICEF  Take 1 capsule by mouth 2 (Two) Times a Day for 7 days.               Where to Get Your Medications        These medications were sent to [facility] [id number] - [place], [place] - [address] - [phone]  - [phone] FX  [address] [address]      Phone: [phone]   cefdinir 300 MG capsule            Trae Soto DO  02/22/24 1465

## 2024-02-21 LAB
QT INTERVAL: 390 MS
QTC INTERVAL: 402 MS

## 2024-02-29 ENCOUNTER — LAB (OUTPATIENT)
Dept: LAB | Facility: HOSPITAL | Age: 84
End: 2024-02-29
Payer: MEDICARE

## 2024-02-29 DIAGNOSIS — E55.9 VITAMIN D DEFICIENCY: ICD-10-CM

## 2024-02-29 DIAGNOSIS — E78.2 MIXED HYPERLIPIDEMIA: ICD-10-CM

## 2024-02-29 DIAGNOSIS — I10 BENIGN ESSENTIAL HYPERTENSION: ICD-10-CM

## 2024-03-01 LAB
25(OH)D3+25(OH)D2 SERPL-MCNC: 81.1 NG/ML (ref 30–100)
ALBUMIN SERPL-MCNC: 4.9 G/DL (ref 3.5–5.2)
ALBUMIN/CREAT UR: 135 MG/G CREAT (ref 0–29)
ALBUMIN/GLOB SERPL: 2.2 G/DL
ALP SERPL-CCNC: 84 U/L (ref 39–117)
ALT SERPL-CCNC: 24 U/L (ref 1–33)
APPEARANCE UR: ABNORMAL
AST SERPL-CCNC: 22 U/L (ref 1–32)
BACTERIA #/AREA URNS HPF: ABNORMAL /HPF
BASOPHILS # BLD AUTO: 0.04 10*3/MM3 (ref 0–0.2)
BASOPHILS NFR BLD AUTO: 0.6 % (ref 0–1.5)
BILIRUB SERPL-MCNC: 0.4 MG/DL (ref 0–1.2)
BILIRUB UR QL STRIP: NEGATIVE
BUN SERPL-MCNC: 17 MG/DL (ref 8–23)
BUN/CREAT SERPL: 19.1 (ref 7–25)
CALCIUM SERPL-MCNC: 10.1 MG/DL (ref 8.6–10.5)
CASTS URNS MICRO: ABNORMAL
CHLORIDE SERPL-SCNC: 96 MMOL/L (ref 98–107)
CHOLEST SERPL-MCNC: 185 MG/DL (ref 0–200)
CO2 SERPL-SCNC: 27.2 MMOL/L (ref 22–29)
COLOR UR: YELLOW
CREAT SERPL-MCNC: 0.89 MG/DL (ref 0.57–1)
CREAT UR-MCNC: 48.8 MG/DL
EGFRCR SERPLBLD CKD-EPI 2021: 64.8 ML/MIN/1.73
EOSINOPHIL # BLD AUTO: 0.09 10*3/MM3 (ref 0–0.4)
EOSINOPHIL NFR BLD AUTO: 1.4 % (ref 0.3–6.2)
EPI CELLS #/AREA URNS HPF: ABNORMAL /HPF
ERYTHROCYTE [DISTWIDTH] IN BLOOD BY AUTOMATED COUNT: 12.4 % (ref 12.3–15.4)
GLOBULIN SER CALC-MCNC: 2.2 GM/DL
GLUCOSE SERPL-MCNC: 91 MG/DL (ref 65–99)
GLUCOSE UR QL STRIP: NEGATIVE
HCT VFR BLD AUTO: 44.6 % (ref 34–46.6)
HDLC SERPL-MCNC: 62 MG/DL (ref 40–60)
HGB BLD-MCNC: 14.9 G/DL (ref 12–15.9)
HGB UR QL STRIP: ABNORMAL
IMM GRANULOCYTES # BLD AUTO: 0.02 10*3/MM3 (ref 0–0.05)
IMM GRANULOCYTES NFR BLD AUTO: 0.3 % (ref 0–0.5)
KETONES UR QL STRIP: NEGATIVE
LDLC SERPL CALC-MCNC: 99 MG/DL (ref 0–100)
LEUKOCYTE ESTERASE UR QL STRIP: ABNORMAL
LYMPHOCYTES # BLD AUTO: 2.12 10*3/MM3 (ref 0.7–3.1)
LYMPHOCYTES NFR BLD AUTO: 32.7 % (ref 19.6–45.3)
MCH RBC QN AUTO: 29.7 PG (ref 26.6–33)
MCHC RBC AUTO-ENTMCNC: 33.4 G/DL (ref 31.5–35.7)
MCV RBC AUTO: 89 FL (ref 79–97)
MICROALBUMIN UR-MCNC: 66 UG/ML
MONOCYTES # BLD AUTO: 0.65 10*3/MM3 (ref 0.1–0.9)
MONOCYTES NFR BLD AUTO: 10 % (ref 5–12)
NEUTROPHILS # BLD AUTO: 3.56 10*3/MM3 (ref 1.7–7)
NEUTROPHILS NFR BLD AUTO: 55 % (ref 42.7–76)
NITRITE UR QL STRIP: NEGATIVE
NRBC BLD AUTO-RTO: 0 /100 WBC (ref 0–0.2)
PH UR STRIP: 7 [PH] (ref 5–8)
PLATELET # BLD AUTO: 209 10*3/MM3 (ref 140–450)
POTASSIUM SERPL-SCNC: 4.1 MMOL/L (ref 3.5–5.2)
PROT SERPL-MCNC: 7.1 G/DL (ref 6–8.5)
PROT UR QL STRIP: ABNORMAL
RBC # BLD AUTO: 5.01 10*6/MM3 (ref 3.77–5.28)
RBC #/AREA URNS HPF: ABNORMAL /HPF
SODIUM SERPL-SCNC: 139 MMOL/L (ref 136–145)
SP GR UR STRIP: 1.01 (ref 1–1.03)
TRIGL SERPL-MCNC: 137 MG/DL (ref 0–150)
UROBILINOGEN UR STRIP-MCNC: ABNORMAL MG/DL
VLDLC SERPL CALC-MCNC: 24 MG/DL (ref 5–40)
WBC # BLD AUTO: 6.48 10*3/MM3 (ref 3.4–10.8)
WBC #/AREA URNS HPF: ABNORMAL /HPF

## 2024-03-04 ENCOUNTER — OFFICE VISIT (OUTPATIENT)
Dept: INTERNAL MEDICINE | Facility: CLINIC | Age: 84
End: 2024-03-04
Payer: MEDICARE

## 2024-03-04 VITALS
HEIGHT: 62 IN | TEMPERATURE: 97.7 F | SYSTOLIC BLOOD PRESSURE: 150 MMHG | HEART RATE: 68 BPM | WEIGHT: 140 LBS | OXYGEN SATURATION: 95 % | BODY MASS INDEX: 25.76 KG/M2 | DIASTOLIC BLOOD PRESSURE: 78 MMHG

## 2024-03-04 DIAGNOSIS — N81.89 OTHER FEMALE GENITAL PROLAPSE: ICD-10-CM

## 2024-03-04 DIAGNOSIS — E78.2 MIXED HYPERLIPIDEMIA: ICD-10-CM

## 2024-03-04 DIAGNOSIS — E55.9 VITAMIN D DEFICIENCY: ICD-10-CM

## 2024-03-04 DIAGNOSIS — R55 VASOVAGAL SYNCOPE: ICD-10-CM
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
## 2024-03-04 DIAGNOSIS — M85.89 OSTEOPENIA OF MULTIPLE SITES: ICD-10-CM

## 2024-03-04 DIAGNOSIS — I10 BENIGN ESSENTIAL HYPERTENSION: Primary | ICD-10-CM

## 2024-03-04 RX ORDER — SIMVASTATIN 20 MG
20 TABLET ORAL
Qty: 90 TABLET | Refills: 1 | Status: SHIPPED | OUTPATIENT
Start: 2024-03-04

## 2024-03-04 RX ORDER — OMEGA-3-ACID ETHYL ESTERS 1 G/1
2 CAPSULE, LIQUID FILLED ORAL NIGHTLY
Qty: 180 CAPSULE | Refills: 1 | Status: SHIPPED | OUTPATIENT
Start: 2024-03-04

## 2024-03-04 RX ORDER — LOSARTAN POTASSIUM 100 MG/1
100 TABLET ORAL DAILY
Qty: 90 TABLET | Refills: 1 | Status: SHIPPED | OUTPATIENT
Start: 2024-03-04

## 2024-03-04 NOTE — ASSESSMENT & PLAN NOTE
She will continue current doses of hydrochlorothiazide and metoprolol. I will increase losartan to 100 mg every evening. She will continue to avoid salt in the diet. She will drink plenty of water.

## 2024-03-04 NOTE — ASSESSMENT & PLAN NOTE
She will continue simvastatin nightly and Lovaza 2 capsules every evening. She will continue a low-fat diet and regular exercise.

## 2024-03-04 NOTE — ASSESSMENT & PLAN NOTE
She will continue trying to drink lots of fluids every day and stay well hydrated. Rise from the bed or from the chair slowly.

## 2024-03-04 NOTE — PROGRESS NOTES
[facility]     Amanda Lawson  1940   [id number]      Patient Care Team:  Marie Soto MD as PCP - General  Marie Soto MD as PCP - Family Medicine  Holly Pierce MD as Consulting Physician (Ophthalmology)  Kenneth Giles MD as Consulting Physician (Urology)  Radha Celis MD as Consulting Physician (Obstetrics and Gynecology)  Antonia Hernandez, RN as Ambulatory  (Population Health)    Chief Complaint   Patient presents with    Hospital Follow Up Visit     Went to [facility] ER 2/19/24    Fall     On Saturday, hurt both knees            HPI  Amanda Lawson is an 83-year-old female who is here for a 6-month follow-up.    Health update.  She is scheduled for a hysterectomy on 03/20/2024 by Dr. Radha Celis due to uterine prolapse. She is wondering if she is healthy enough to undergo surgery. It has dropped quite a bit since she first noticed it. She was diagnosed with endometriosis. She had labs on 02/29/2024 which showed normal white blood cells, red blood cells and platelets. Her updated urinalysis was okay. Her LDL 99 mg/dL. She has done less exercise over the winter. Her cholesterol was 62 mg/dL. Her triglycerides are under 150 mg/dL. Her liver enzymes are normal. Her vitamin D levels are elevated. She takes 1 capsule of vitamin D plus a calcium with vitamin D in it.    Hypertension.  Her blood pressure has been running 150 to 160 systolic. Today it is 150/78 mmHg. She woke up one morning and it was 190/98 mmHg which frightened her. She went to the emergency department on 02/19/2024, which revealed she had a urinary tract infection. Her blood pressure also decreased while she was at the hospital. Her blood count and kidney function were normal. On Friday, 03/01/2024, she experienced aa syncopal episodes while trying to go to the bathroom. It caused her to get an abrasion on the inside of her mouth. She did not go to the ED during this time.  She took Dramamine and it helped. When she woke up, she was on the floor beside her bed. She did not hurt herself. She has had dizzy episodes in the past, but she was outside working in the yard. She also checks her BP later in the day. It is usually 150-160 systolic. She is taking half a tablet of hydrochlorothiazide, losartan 50 mg once a day, and metoprolol 50 mg twice a day.    Falls.  She tripped over an uneven sidewalk on Saturday, 03/02/2024. She did not fracture anything. She did receive multiple bruises. She chose not to get evaluated. She denies any swelling. She is ambulating okay. She was not dizzy during or prior to the fall. She takes Toprol right after breakfast and after the evening meal.       CHRONIC CONDITIONS      Past Medical History:   Diagnosis Date    Allergic     Bladder cancer 1990    Closed fracture of nasal bone with routine healing 05/22/2020 5/22/2020 Marie Soto MD  Pain from nasal fracture has improved.  She no longer needs to take Tylenol.  There is mild residual swelling in the nasal cavity.    Glaucoma     HL (hearing loss)     Hyperlipidemia     Hypertension     Laceration of brow without complication 05/22/2020 5/22/2020 Marie Soto MD  Laceration of right brow due to fall due to syncope.    Stitches removed today.  Steri-Strips applied to a 3 mm area without good approximation.  The rest of the laceration is well-healed.  She was advised to stop using Neosporin and just use a little Vaseline on it daily.    Overweight with body mass index (BMI) of 26 to 26.9 in adult 02/04/2021 2/4/2021 Marie Soto MD  Mildly overweight BMI at 26.  Continue low-fat diet.  Avoid sugars and avoid snacking.  Increase exercise and physical activity.  Weigh every Monday morning to monitor.    Plantar fasciitis 2010    Rosacea     Traumatic black eye, initial encounter 05/22/2020 5/22/2020 Marie Soto MD  Bilateral delmy-orbital ecchymoses due to fall due to syncope.  "   Vasovagal syncope     since childhood    Visual impairment        Past Surgical History:   Procedure Laterality Date    CATARACT EXTRACTION Left 07/2022    ENDOMETRIAL ABLATION      ENDOMETRIAL BIOPSY  1983       Family History   Problem Relation Age of Onset    Coronary artery disease Mother     Hypertension Father     Coronary artery disease Brother     Hyperlipidemia Daughter     Obesity Maternal Grandmother     Mitral valve prolapse Daughter     Breast cancer Neg Hx     Ovarian cancer Neg Hx        Social History     Socioeconomic History    Marital status:    Tobacco Use    Smoking status: Never    Smokeless tobacco: Never   Vaping Use    Vaping status: Never Used   Substance and Sexual Activity    Alcohol use: Not Currently    Drug use: Never    Sexual activity: Not Currently       Allergies   Allergen Reactions    Brimonidine Other (See Comments)     hypotension    Timolol Rash       Review of Systems:     Review of Systems    Vital Signs  Vitals:    03/04/24 1032   BP: 150/78   BP Location: Left arm   Patient Position: Sitting   Cuff Size: Adult   Pulse: 68   Temp: 97.7 °F (36.5 °C)   TempSrc: Infrared   SpO2: 95%   Weight: 63.5 kg (140 lb)   Height: 157.5 cm (62.01\")     Body mass index is 25.6 kg/m².  BMI is >= 25 and <30. (Overweight) The following options were offered after discussion;: exercise counseling/recommendations and nutrition counseling/recommendations        Current Outpatient Medications:     aspirin 81 MG tablet, Take 1 tablet by mouth Daily., Disp: 90 tablet, Rfl: 1    CALCIUM-VITAMIN D PO, Take  by mouth Daily. 1 chewable in the morning and after dinner., Disp: , Rfl:     Coenzyme Q10 (CO Q 10) 100 MG capsule, Take 1 capsule by mouth As Needed., Disp: , Rfl:     famotidine (PEPCID) 20 MG tablet, Take 1 tablet by mouth 2 (Two) Times a Day. (Patient taking differently: Take 1 tablet by mouth 2 (Two) Times a Day As Needed.), Disp: 180 tablet, Rfl: 1    hydroCHLOROthiazide " (HYDRODIURIL) 12.5 MG tablet, Take 1 tablet by mouth Daily., Disp: 90 tablet, Rfl: 1    losartan (COZAAR) 100 MG tablet, Take 1 tablet by mouth Daily., Disp: 90 tablet, Rfl: 1    metoprolol succinate XL (TOPROL-XL) 50 MG 24 hr tablet, Take 1 tablet by mouth 2 (Two) Times a Day., Disp: 180 tablet, Rfl: 1    Multiple Vitamins-Minerals (SENIOR MULTIVITAMIN PLUS PO), Take 1 tablet by mouth Daily., Disp: , Rfl:     omega-3 acid ethyl esters (LOVAZA) 1 g capsule, Take 2 capsules by mouth Every Night., Disp: 180 capsule, Rfl: 1    simvastatin (ZOCOR) 20 MG tablet, Take 1 tablet by mouth every night at bedtime., Disp: 90 tablet, Rfl: 1    sodium chloride (KARRIE 128) 5 % ophthalmic solution, Apply 1 drop to eye(s) as directed by provider 2 (Two) Times a Day. Every morning and night, Disp: , Rfl:     timolol (TIMOPTIC-XR) 0.5 % ophthalmic gel-forming, Administer 1 drop to both eyes Every Morning., Disp: , Rfl:     travoprost, BRANDYN free, (TRAVATAN) 0.004 % solution ophthalmic solution, Daily. Instill 1 drop into each eye once daily, Disp: , Rfl:     Physical Exam:    Physical Exam  Vitals and nursing note reviewed.   Constitutional:       Appearance: She is well-developed.   HENT:      Head: Normocephalic.   Eyes:      Conjunctiva/sclera: Conjunctivae normal.      Pupils: Pupils are equal, round, and reactive to light.   Neck:      Thyroid: No thyromegaly.   Cardiovascular:      Rate and Rhythm: Normal rate and regular rhythm.      Heart sounds: Normal heart sounds.   Pulmonary:      Effort: Pulmonary effort is normal.      Breath sounds: Normal breath sounds.   Musculoskeletal:         General: Normal range of motion.      Cervical back: Normal range of motion and neck supple.      Right lower leg: No edema.      Left lower leg: No edema.   Lymphadenopathy:      Cervical: No cervical adenopathy.   Neurological:      Mental Status: She is alert and oriented to person, place, and time.      Cranial Nerves: Cranial nerve deficit  "present.      Gait: Gait normal.   Psychiatric:         Thought Content: Thought content normal.          ACE III MINI        Results Review:    I reviewed the patient's new clinical results.    CMP:  Lab Results   Component Value Date    BUN 17 02/29/2024    CREATININE 0.89 02/29/2024    EGFRIFNONA 60 (L) 02/16/2022    EGFRIFAFRI 73 02/16/2022    BCR 19.1 02/29/2024     02/29/2024    K 4.1 02/29/2024    CO2 27.2 02/29/2024    CALCIUM 10.1 02/29/2024    PROTENTOTREF 7.1 02/29/2024    ALBUMIN 4.9 02/29/2024    LABGLOBREF 2.2 02/29/2024    LABIL2 2.2 02/29/2024    BILITOT 0.4 02/29/2024    ALKPHOS 84 02/29/2024    AST 22 02/29/2024    ALT 24 02/29/2024     HbA1c:  No results found for: \"HGBA1C\"  Microalbumin:  Lab Results   Component Value Date    MICROALBUR 66.0 02/29/2024     Lipid Panel  Lab Results   Component Value Date    CHOL 148 02/04/2019    TRIG 137 02/29/2024    HDL 62 (H) 02/29/2024    LDL 99 02/29/2024    AST 22 02/29/2024    ALT 24 02/29/2024       Medication Review: Medications reviewed and noted  Patient Instructions   Problem List Items Addressed This Visit          Cardiac and Vasculature    Benign essential hypertension - Primary    Overview     Taking losartan 50mg and hydrochlorothiazide 12.5 mg daily and metoprolol XL 50mg twice a day.          Current Assessment & Plan     She will continue current doses of hydrochlorothiazide and metoprolol. I will increase losartan to 100 mg every evening. She will continue to avoid salt in the diet. She will drink plenty of water.         Relevant Medications    metoprolol succinate XL (TOPROL-XL) 50 MG 24 hr tablet    hydroCHLOROthiazide (HYDRODIURIL) 12.5 MG tablet    losartan (COZAAR) 100 MG tablet    Other Relevant Orders    Microalbumin / Creatinine Urine Ratio - Urine, Clean Catch (Completed)    Urinalysis With Microscopic - Urine, Clean Catch (Completed)    Mixed hyperlipidemia    Overview     Taking simvastatin and lovaza.         Current " Assessment & Plan      She will continue simvastatin nightly and Lovaza 2 capsules every evening. She will continue a low-fat diet and regular exercise.         Relevant Medications    simvastatin (ZOCOR) 20 MG tablet    omega-3 acid ethyl esters (LOVAZA) 1 g capsule    Other Relevant Orders    CBC & Differential (Completed)    Comprehensive Metabolic Panel (Completed)    Lipid Panel (Completed)    Vasovagal syncope    Overview     5/22/2020 Marie Soto MD    No further episodes since May.  She will continue to drink more fluids and to take water out to the yard with her.  She was advised not to work bending over for an extended period of time.         Current Assessment & Plan     She will continue trying to drink lots of fluids every day and stay well hydrated. Rise from the bed or from the chair slowly.            Endocrine and Metabolic    Vitamin D deficiency    Overview     Taking vitamin D3 and calcium with D.         Current Assessment & Plan     She will stop taking the extra vitamin D3 tablet and just take calcium with vitamin D in it.         Relevant Orders    Vitamin D,25-Hydroxy (Completed)       Genitourinary and Reproductive     Other female genital prolapse    Overview     Bladder prolapse.  Seeing Dr. Celis.  Planned hysterectomy 3/20/24.         Current Assessment & Plan     She will proceed with hysterectomy by Dr. Celis on 03/20/2024.            Musculoskeletal and Injuries    Fall on or from sidewalk curb (Chronic)    Current Assessment & Plan     She tripped on an uneven sidewalk while she was folding her umbrella. We discussed paying close attention to her surroundings when walking. She will walk slowly and do not get in a hurry. She will stay well hydrated.         Osteopenia of multiple sites    Overview     DEXA 11/1/2021 showed a decrease in T-scores in the spine, but it is still in the osteopenia range.  T score in the femoral neck is -1.3 which is also in the mild osteopenia  range.             Current Assessment & Plan     She will continue walking regularly and exercising. She will take calcium with D daily, but no extra vitamin D.               Diagnosis Plan   1. Benign essential hypertension  Microalbumin / Creatinine Urine Ratio - Urine, Clean Catch    Urinalysis With Microscopic - Urine, Clean Catch      2. Mixed hyperlipidemia  CBC & Differential    Comprehensive Metabolic Panel    Lipid Panel    simvastatin (ZOCOR) 20 MG tablet      3. Vasovagal syncope        4. Other female genital prolapse        5. Fall involving sidewalk curb, initial encounter        6. Osteopenia of multiple sites        7. Vitamin D deficiency  Vitamin D,25-Hydroxy              Plan of care reviewed with patient at the conclusion of today's visit. Education was provided regarding diagnosis, management, and any prescribed or recommended OTC medications.Patient verbalizes understanding of and agreement with management plan.       Marie Soto MD    Transcribed from ambient dictation for Marie Soto MD by Destiney Em.  03/04/24   13:21 EST    Patient or patient representative verbalized consent to the visit recording.  I have personally performed the services described in this document as transcribed by the above individual, and it is both accurate and complete.

## 2024-03-04 NOTE — ASSESSMENT & PLAN NOTE
She tripped on an uneven sidewalk while she was folding her umbrella. We discussed paying close attention to her surroundings when walking. She will walk slowly and do not get in a hurry. She will stay well hydrated.

## 2024-03-04 NOTE — ASSESSMENT & PLAN NOTE
She will continue walking regularly and exercising. She will take calcium with D daily, but no extra vitamin D.

## 2024-03-05 NOTE — PATIENT INSTRUCTIONS
Patient Instructions  Problem List Items Addressed This Visit          Cardiac and Vasculature    Benign essential hypertension - Primary    Overview     Taking losartan 50mg and hydrochlorothiazide 12.5 mg daily and metoprolol XL 50mg twice a day.          Current Assessment & Plan     She will continue current doses of hydrochlorothiazide and metoprolol. I will increase losartan to 100 mg every evening. She will continue to avoid salt in the diet. She will drink plenty of water.         Relevant Medications    metoprolol succinate XL (TOPROL-XL) 50 MG 24 hr tablet    hydroCHLOROthiazide (HYDRODIURIL) 12.5 MG tablet    losartan (COZAAR) 100 MG tablet    Other Relevant Orders    Microalbumin / Creatinine Urine Ratio - Urine, Clean Catch (Completed)    Urinalysis With Microscopic - Urine, Clean Catch (Completed)    Mixed hyperlipidemia    Overview     Taking simvastatin and lovaza.         Current Assessment & Plan      She will continue simvastatin nightly and Lovaza 2 capsules every evening. She will continue a low-fat diet and regular exercise.         Relevant Medications    simvastatin (ZOCOR) 20 MG tablet    omega-3 acid ethyl esters (LOVAZA) 1 g capsule    Other Relevant Orders    CBC & Differential (Completed)    Comprehensive Metabolic Panel (Completed)    Lipid Panel (Completed)    Vasovagal syncope    Overview     5/22/2020 Marie Soto MD    No further episodes since May.  She will continue to drink more fluids and to take water out to the yard with her.  She was advised not to work bending over for an extended period of time.         Current Assessment & Plan     She will continue trying to drink lots of fluids every day and stay well hydrated. Rise from the bed or from the chair slowly.            Endocrine and Metabolic    Vitamin D deficiency    Overview     Taking vitamin D3 and calcium with D.         Current Assessment & Plan     She will stop taking the extra vitamin D3 tablet and just take  calcium with vitamin D in it.         Relevant Orders    Vitamin D,25-Hydroxy (Completed)       Genitourinary and Reproductive     Other female genital prolapse    Overview     Bladder prolapse.  Seeing Dr. Celis.  Planned hysterectomy 3/20/24.         Current Assessment & Plan     She will proceed with hysterectomy by Dr. Celis on 03/20/2024.            Musculoskeletal and Injuries    Fall on or from sidewalk curb (Chronic)    Current Assessment & Plan     She tripped on an uneven sidewalk while she was folding her umbrella. We discussed paying close attention to her surroundings when walking. She will walk slowly and do not get in a hurry. She will stay well hydrated.         Osteopenia of multiple sites    Overview     DEXA 11/1/2021 showed a decrease in T-scores in the spine, but it is still in the osteopenia range.  T score in the femoral neck is -1.3 which is also in the mild osteopenia range.             Current Assessment & Plan     She will continue walking regularly and exercising. She will take calcium with D daily, but no extra vitamin D.            Fall Prevention in the Home, Adult  Falls can cause injuries and affect people of all ages. There are many simple things that you can do to make your home safe and to help prevent falls.  If you need it, ask for help making these changes.  What actions can I take to prevent falls?  General information  Use good lighting in all rooms. Make sure to:  Replace any light bulbs that burn out.  Turn on lights if it is dark and use night-lights.  Keep items that you use often in easy-to-reach places. Lower the shelves around your home if needed.  Move furniture so that there are clear paths around it.  Do not keep throw rugs or other things on the floor that can make you trip.  If any of your floors are uneven, fix them.  Add color or contrast paint or tape to clearly melina and help you see:  Grab bars or handrails.  First and last steps of staircases.  Where the  edge of each step is.  If you use a ladder or stepladder:  Make sure that it is fully opened. Do not climb a closed ladder.  Make sure the sides of the ladder are locked in place.  Have someone hold the ladder while you use it.  Know where your pets are as you move through your home.  What can I do in the bathroom?         Keep the floor dry. Clean up any water that is on the floor right away.  Remove soap buildup in the bathtub or shower. Buildup makes bathtubs and showers slippery.  Use non-skid mats or decals on the floor of the bathtub or shower.  Attach bath mats securely with double-sided, non-slip rug tape.  If you need to sit down while you are in the shower, use a non-slip stool.  Install grab bars by the toilet and in the bathtub and shower. Do not use towel bars as grab bars.  What can I do in the bedroom?  Make sure that you have a light by your bed that is easy to reach.  Do not use any sheets or blankets on your bed that hang to the floor.  Have a firm bench or chair with side arms that you can use for support when you get dressed.  What can I do in the kitchen?  Clean up any spills right away.  If you need to reach something above you, use a sturdy step stool that has a grab bar.  Keep electrical cables out of the way.  Do not use floor polish or wax that makes floors slippery.  What can I do with my stairs?  Do not leave anything on the stairs.  Make sure that you have a light switch at the top and the bottom of the stairs. Have them installed if you do not have them.  Make sure that there are handrails on both sides of the stairs. Fix handrails that are broken or loose. Make sure that handrails are as long as the staircases.  Install non-slip stair treads on all stairs in your home if they do not have carpet.  Avoid having throw rugs at the top or bottom of stairs, or secure the rugs with carpet tape to prevent them from moving.  Choose a carpet design that does not hide the edge of steps on the  stairs. Make sure that carpet is firmly attached to the stairs. Fix any carpet that is loose or worn.  What can I do on the outside of my home?  Use bright outdoor lighting.  Repair the edges of walkways and driveways and fix any cracks. Clear paths of anything that can make you trip, such as tools or rocks.  Add color or contrast paint or tape to clearly meilna and help you see high doorway thresholds.  Trim any bushes or trees on the main path into your home.  Check that handrails are securely fastened and in good repair. Both sides of all steps should have handrails.  Install guardrails along the edges of any raised decks or porches.  Have leaves, snow, and ice cleared regularly. Use sand, salt, or ice melt on walkways during winter months if you live where there is ice and snow.  In the garage, clean up any spills right away, including grease or oil spills.  What other actions can I take?  Review your medicines with your health care provider. Some medicines can make you confused or feel dizzy. This can increase your chance of falling.  Wear closed-toe shoes that fit well and support your feet. Wear shoes that have rubber soles and low heels.  Use a cane, walker, scooter, or crutches that help you move around if needed.  Talk with your provider about other ways that you can decrease your risk of falls. This may include seeing a physical therapist to learn to do exercises to improve movement and strength.  Where to find more information  Centers for Disease Control and Prevention, [place]: cdc.gov  National Datto on Aging: rajeev.nih.gov  National Datto on Aging: rajeev.nih.gov  Contact a health care provider if:  You are afraid of falling at home.  You feel weak, drowsy, or dizzy at home.  You fall at home.  Get help right away if you:  Lose consciousness or have trouble moving after a fall.  Have a fall that causes a head injury.  These symptoms may be an emergency. Get help right away. Call 911.  Do not wait to  see if the symptoms will go away.  Do not drive yourself to the hospital.  This information is not intended to replace advice given to you by your health care provider. Make sure you discuss any questions you have with your health care provider.  Document Revised: 08/21/2023 Document Reviewed: 08/21/2023  Elsevier Patient Education © 2023 [employer]

## 2024-03-06 ENCOUNTER — TELEPHONE (OUTPATIENT)
Dept: INTERNAL MEDICINE | Facility: CLINIC | Age: 84
End: 2024-03-06
Payer: MEDICARE

## 2024-03-06 NOTE — LETTER
"March 8, 2024       No Recipients    Patient: Amanda Lawson   YOB: 1940   Date of Visit: 3/6/2024     Dear Dr. Bonner,       It is in my medical opinion to recommend Amanda Lawson discontinue her once daily aspirin 81mg for 7 days prior to her upcoming surgery.      Sincerely,        Marie Peoples MD        CC:   No Recipients    AndieSergio, MA  03/08/24 0922  Incomplete  Unable to reach pt via phone. Called Dr. Baptiste to relay the below:    \"Please call the patient to let her know she can stop the aspirin 7 days before her surgery.  Also let Dr. Baptiste's nurse know\"    Dr. Baptiste's nurse requested a letter authorizing the above with a signature from Yelitza Casiano RegSched Rep  03/06/24 1018  Signed    Caller: Amanda Lawson    Relationship: Self    Best call back number:      Which medication are you concerned about:     aspirin 81 MG tablet       Who prescribed you this medication:DR PEOPLES    When did you start taking this medication: ONGOING    What are your concerns: PATIENT HAS A HYSTERECTOMY ON 032024 AND HER SURGEONS OFFICE IS ASKING WHEN SHE CAN SAFELY BE OFF THIS MEDICATION: PATIENT ADVISED THAT THE OFFICE CAN CALL DR BONNER  TO ADVISE, JOSE ALARCON IS HER NURSE;          "

## 2024-03-06 NOTE — TELEPHONE ENCOUNTER
Caller: Amanda Lawson    Relationship: Self    Best call back number:  [phone]    Which medication are you concerned about:     aspirin 81 MG tablet       Who prescribed you this medication:DR PEOPLES    When did you start taking this medication: ONGOING    What are your concerns: PATIENT HAS A HYSTERECTOMY ON 032024 AND HER SURGEONS OFFICE IS ASKING WHEN SHE CAN SAFELY BE OFF THIS MEDICATION: PATIENT ADVISED THAT THE OFFICE CAN CALL DR BONNER  TO ADVISE, JOSE ALARCON IS HER NURSE;

## 2024-03-08 NOTE — TELEPHONE ENCOUNTER
"Unable to reach pt via phone. Called Dr. Baptiste to relay the below:    \"Please call the patient to let her know she can stop the aspirin 7 days before her surgery.  Also let Dr. Baptiste's nurse know\"    Dr. Baptiste's nurse requested a letter authorizing the above with a signature from Dr. Soto  "

## 2024-03-13 ENCOUNTER — TELEPHONE (OUTPATIENT)
Dept: CASE MANAGEMENT | Facility: OTHER | Age: 84
End: 2024-03-13
Payer: MEDICARE

## 2024-03-13 NOTE — TELEPHONE ENCOUNTER
Central Prior Authorization Team   Phone: [phone]    [place] Initiation    Medication: amphetamine-dextroamphetamine (ADDERALL XR) 30 MG per 24 hr capsule  Insurance Company: [employer] - Phone [phone] Fax [phone]  Pharmacy Filling the Rx: [facility] [address] [place] [address]  Filling Pharmacy Phone: [phone]  Filling Pharmacy Fax: [phone]  Start Date: 2/20/2018       RN-ACM made outreach to patient regarding HTN program, no answer, left message.    F/u outreach scheduled.

## 2024-03-14 ENCOUNTER — OFFICE VISIT (OUTPATIENT)
Dept: INTERNAL MEDICINE | Facility: CLINIC | Age: 84
End: 2024-03-14
Payer: MEDICARE

## 2024-03-14 ENCOUNTER — TELEPHONE (OUTPATIENT)
Dept: INTERNAL MEDICINE | Facility: CLINIC | Age: 84
End: 2024-03-14

## 2024-03-14 VITALS
TEMPERATURE: 97.1 F | BODY MASS INDEX: 25.95 KG/M2 | WEIGHT: 141 LBS | HEART RATE: 66 BPM | SYSTOLIC BLOOD PRESSURE: 146 MMHG | HEIGHT: 62 IN | OXYGEN SATURATION: 93 % | DIASTOLIC BLOOD PRESSURE: 70 MMHG

## 2024-03-14 DIAGNOSIS — I10 BENIGN ESSENTIAL HYPERTENSION: Primary | ICD-10-CM

## 2024-03-14 DIAGNOSIS — N81.89 OTHER FEMALE GENITAL PROLAPSE: ICD-10-CM

## 2024-03-14 DIAGNOSIS — E78.2 MIXED HYPERLIPIDEMIA: ICD-10-CM

## 2024-03-14 PROCEDURE — 3077F SYST BP >= 140 MM HG: CPT | Performed by: INTERNAL MEDICINE

## 2024-03-14 PROCEDURE — 99214 OFFICE O/P EST MOD 30 MIN: CPT | Performed by: INTERNAL MEDICINE

## 2024-03-14 PROCEDURE — 1160F RVW MEDS BY RX/DR IN RCRD: CPT | Performed by: INTERNAL MEDICINE

## 2024-03-14 PROCEDURE — 1159F MED LIST DOCD IN RCRD: CPT | Performed by: INTERNAL MEDICINE

## 2024-03-14 PROCEDURE — 3078F DIAST BP <80 MM HG: CPT | Performed by: INTERNAL MEDICINE

## 2024-03-14 NOTE — TELEPHONE ENCOUNTER
"Called, unable to connect over the phone. Did not leave voicemail. Disconnected call.    We need to relay the following message from Dr. Soto:    HUB TO RELAY:    \"I meant to tell her on the morning of the surgery to take her losartan tablet and then metoprolol with just a small sip of water.  All other medications should be taken later in the day after surgery \"    Sent [place] portal message in addition  "

## 2024-03-14 NOTE — Clinical Note
Please call patient or send her a portal message.  I meant to tell her on the morning of the surgery to take her losartan tablet and then metoprolol with just a small sip of water.  All other medications should be taken later in the day after surgery

## 2024-03-14 NOTE — ASSESSMENT & PLAN NOTE
I will increase the metoprolol to 1.5 of the 50 mg tablets every morning. She will continue taking 1 tablet of metoprolol in the evenings. She will continue to avoid salt in the diet. She will continue current doses of losartan at 100 mg daily and hydrochlorothiazide at 12.5 mg daily. She will continue walking daily.         On the day of surgery, she will take the losartan and the metoprolol with a small sip of water. Other medications can be taken later after surgery.

## 2024-03-14 NOTE — PROGRESS NOTES
[facility]     Amanda Lawson  1940   [id number]      Patient Care Team:  Marie Soto MD as PCP - General  Marie Soto MD as PCP - Family Medicine  Holly Pierce MD as Consulting Physician (Ophthalmology)  Kenneth Giles MD as Consulting Physician (Urology)  Radha Celis MD as Consulting Physician (Obstetrics and Gynecology)  Antonia Hernandez, RN as Ambulatory  ([facility])    Chief Complaint   Patient presents with    Hypertension     10 day f/u            HPI  Amanda Lawson is an 83-year-old female who presents for an office visit for a blood pressure check. Her last visit was on 03/04/2024.    Hypertension.  She is scheduled for a hysterectomy next week on Wednesday, 03/20/2024. She inquires if the anesthesia would cause an issue with her blood pressure. Her blood pressure today is 146/70 mmHg. Per her BP readings, 147 is her highest systolic. She is RH negative. She has been trying to walk every day. She denies any swelling in her feet. She is taking losartan 100 mg, hydrochlorothiazide, and metoprolol. She tries to eat a low-salt diet. She does not drink any sodas. Her pulses at home are 66 to 76 beats per minute.    Genital prolapse.  Her symptoms are not severe.  She is wondering if she will have a new set of problems after the surgery.      CHRONIC CONDITIONS    Past Medical History:   Diagnosis Date    Allergic     Bladder cancer 1990    Closed fracture of nasal bone with routine healing 05/22/2020 5/22/2020 Marie Soto MD  Pain from nasal fracture has improved.  She no longer needs to take Tylenol.  There is mild residual swelling in the nasal cavity.    Glaucoma     HL (hearing loss)     Hyperlipidemia     Hypertension     Laceration of brow without complication 05/22/2020 5/22/2020 Marie Soto MD  Laceration of right brow due to fall due to syncope.    Stitches removed today.  Steri-Strips applied to a 3  "mm area without good approximation.  The rest of the laceration is well-healed.  She was advised to stop using Neosporin and just use a little Vaseline on it daily.    Overweight with body mass index (BMI) of 26 to 26.9 in adult 02/04/2021 2/4/2021 Marie Soto MD  Mildly overweight BMI at 26.  Continue low-fat diet.  Avoid sugars and avoid snacking.  Increase exercise and physical activity.  Weigh every Monday morning to monitor.    Plantar fasciitis 2010    Rosacea     Traumatic black eye, initial encounter 05/22/2020 5/22/2020 Marie Soto MD  Bilateral delmy-orbital ecchymoses due to fall due to syncope.    Vasovagal syncope     since childhood    Visual impairment        Past Surgical History:   Procedure Laterality Date    CATARACT EXTRACTION Left 07/2022    ENDOMETRIAL ABLATION      ENDOMETRIAL BIOPSY  1983       Family History   Problem Relation Age of Onset    Coronary artery disease Mother     Hypertension Father     Coronary artery disease Brother     Hyperlipidemia Daughter     Obesity Maternal Grandmother     Mitral valve prolapse Daughter     Breast cancer Neg Hx     Ovarian cancer Neg Hx        Social History     Socioeconomic History    Marital status:    Tobacco Use    Smoking status: Never    Smokeless tobacco: Never   Vaping Use    Vaping status: Never Used   Substance and Sexual Activity    Alcohol use: Not Currently    Drug use: Never    Sexual activity: Not Currently       Allergies   Allergen Reactions    Brimonidine Other (See Comments)     hypotension    Timolol Rash       Review of Systems:     Review of Systems    Vital Signs  Vitals:    03/14/24 0857   BP: 146/70   BP Location: Left arm   Patient Position: Sitting   Cuff Size: Adult   Pulse: 66   Temp: 97.1 °F (36.2 °C)   TempSrc: Infrared   SpO2: 93%   Weight: 64 kg (141 lb)   Height: 157.5 cm (62.01\")     Body mass index is 25.78 kg/m².  BMI is >= 25 and <30. (Overweight) The following options were offered after " discussion;: exercise counseling/recommendations and nutrition counseling/recommendations        Current Outpatient Medications:     aspirin 81 MG tablet, Take 1 tablet by mouth Daily., Disp: 90 tablet, Rfl: 1    CALCIUM-VITAMIN D PO, Take  by mouth Daily. 1 chewable in the morning and after dinner., Disp: , Rfl:     Coenzyme Q10 (CO Q 10) 100 MG capsule, Take 1 capsule by mouth As Needed., Disp: , Rfl:     famotidine (PEPCID) 20 MG tablet, Take 1 tablet by mouth 2 (Two) Times a Day. (Patient taking differently: Take 1 tablet by mouth 2 (Two) Times a Day As Needed.), Disp: 180 tablet, Rfl: 1    hydroCHLOROthiazide (HYDRODIURIL) 12.5 MG tablet, Take 1 tablet by mouth Daily., Disp: 90 tablet, Rfl: 1    losartan (COZAAR) 100 MG tablet, Take 1 tablet by mouth Daily., Disp: 90 tablet, Rfl: 1    metoprolol succinate XL (TOPROL-XL) 50 MG 24 hr tablet, Take 1 tablet by mouth 2 (Two) Times a Day., Disp: 180 tablet, Rfl: 1    Multiple Vitamins-Minerals (SENIOR MULTIVITAMIN PLUS PO), Take 1 tablet by mouth Daily., Disp: , Rfl:     omega-3 acid ethyl esters (LOVAZA) 1 g capsule, Take 2 capsules by mouth Every Night., Disp: 180 capsule, Rfl: 1    simvastatin (ZOCOR) 20 MG tablet, Take 1 tablet by mouth every night at bedtime., Disp: 90 tablet, Rfl: 1    sodium chloride (KARRIE 128) 5 % ophthalmic solution, Apply 1 drop to eye(s) as directed by provider 2 (Two) Times a Day. Every morning and night, Disp: , Rfl:     timolol (TIMOPTIC-XR) 0.5 % ophthalmic gel-forming, Administer 1 drop to both eyes Every Morning., Disp: , Rfl:     travoprost, BAK free, (TRAVATAN) 0.004 % solution ophthalmic solution, Daily. Instill 1 drop into each eye once daily, Disp: , Rfl:     Physical Exam:    Physical Exam  Vitals and nursing note reviewed.   Constitutional:       Appearance: She is well-developed.   HENT:      Head: Normocephalic.   Eyes:      Conjunctiva/sclera: Conjunctivae normal.      Pupils: Pupils are equal, round, and reactive to light.  "  Neck:      Thyroid: No thyromegaly.   Cardiovascular:      Rate and Rhythm: Normal rate and regular rhythm.      Heart sounds: Normal heart sounds.   Pulmonary:      Effort: Pulmonary effort is normal.      Breath sounds: Normal breath sounds.   Musculoskeletal:         General: Normal range of motion.      Cervical back: Normal range of motion and neck supple.   Lymphadenopathy:      Cervical: No cervical adenopathy.   Neurological:      Mental Status: She is alert and oriented to person, place, and time.   Psychiatric:         Thought Content: Thought content normal.          ACE III MINI        Results Review:    I reviewed the patient's new clinical results.    CMP:  Lab Results   Component Value Date    BUN 17 02/29/2024    CREATININE 0.89 02/29/2024    EGFRIFNONA 60 (L) 02/16/2022    EGFRIFAFRI 73 02/16/2022    BCR 19.1 02/29/2024     02/29/2024    K 4.1 02/29/2024    CO2 27.2 02/29/2024    CALCIUM 10.1 02/29/2024    PROTENTOTREF 7.1 02/29/2024    ALBUMIN 4.9 02/29/2024    LABGLOBREF 2.2 02/29/2024    LABIL2 2.2 02/29/2024    BILITOT 0.4 02/29/2024    ALKPHOS 84 02/29/2024    AST 22 02/29/2024    ALT 24 02/29/2024     HbA1c:  No results found for: \"HGBA1C\"  Microalbumin:  Lab Results   Component Value Date    MICROALBUR 66.0 02/29/2024     Lipid Panel  Lab Results   Component Value Date    CHOL 148 02/04/2019    TRIG 137 02/29/2024    HDL 62 (H) 02/29/2024    LDL 99 02/29/2024    AST 22 02/29/2024    ALT 24 02/29/2024       Medication Review: Medications reviewed and noted  Patient Instructions   Problem List Items Addressed This Visit          Cardiac and Vasculature    Benign essential hypertension - Primary    Overview     Taking losartan 100mg and hydrochlorothiazide 12.5 mg daily and metoprolol XL 50mg twice a day.          Current Assessment & Plan     I will increase the metoprolol to 1.5 of the 50 mg tablets every morning. She will continue taking 1 tablet of metoprolol in the evenings. She " will continue to avoid salt in the diet. She will continue current doses of losartan at 100 mg daily and hydrochlorothiazide at 12.5 mg daily. She will continue walking daily.         On the day of surgery, she will take the losartan and the metoprolol with a small sip of water. Other medications can be taken later after surgery.         Relevant Medications    metoprolol succinate XL (TOPROL-XL) 50 MG 24 hr tablet    hydroCHLOROthiazide (HYDRODIURIL) 12.5 MG tablet    losartan (COZAAR) 100 MG tablet    Mixed hyperlipidemia    Overview     Taking simvastatin and lovaza.         Current Assessment & Plan      She will continue simvastatin and Lovaza.         Relevant Medications    simvastatin (ZOCOR) 20 MG tablet    omega-3 acid ethyl esters (LOVAZA) 1 g capsule       Genitourinary and Reproductive     Other female genital prolapse    Overview     Bladder prolapse.  Seeing Dr. Celis.  Planned hysterectomy 3/20/24.         Current Assessment & Plan     She will proceed with hysterectomy on 03/20/2024 by Dr. Radha Celis.               Diagnosis Plan   1. Benign essential hypertension        2. Other female genital prolapse        3. Mixed hyperlipidemia          She will call us on Monday or send a portal message with her blood pressures and pulses.        Plan of care reviewed with patient at the conclusion of today's visit. Education was provided regarding diagnosis, management, and any prescribed or recommended OTC medications.Patient verbalizes understanding of and agreement with management plan.         Marie Soto MD      Transcribed from ambient dictation for Marie Soto MD by Destiney Em.  03/14/24   11:22 EDT    Patient or patient representative verbalized consent to the visit recording.  I have personally performed the services described in this document as transcribed by the above individual, and it is both accurate and complete.

## 2024-03-15 NOTE — TELEPHONE ENCOUNTER
"Called, unable to connect over the phone. Did not leave voicemail. Disconnected call.     We need to relay the following message from Dr. Soto:     HUB TO RELAY:     \"I meant to tell her on the morning of the surgery to take her losartan tablet and then metoprolol with just a small sip of water.  All other medications should be taken later in the day after surgery \"  "

## 2024-03-16 NOTE — PATIENT INSTRUCTIONS
Patient Instructions  Problem List Items Addressed This Visit          Cardiac and Vasculature    Benign essential hypertension - Primary    Overview     Taking losartan 100mg and hydrochlorothiazide 12.5 mg daily and metoprolol XL 50mg twice a day.          Current Assessment & Plan     I will increase the metoprolol to 1.5 of the 50 mg tablets every morning. She will continue taking 1 tablet of metoprolol in the evenings. She will continue to avoid salt in the diet. She will continue current doses of losartan at 100 mg daily and hydrochlorothiazide at 12.5 mg daily. She will continue walking daily.         On the day of surgery, she will take the losartan and the metoprolol with a small sip of water. Other medications can be taken later after surgery.         Relevant Medications    metoprolol succinate XL (TOPROL-XL) 50 MG 24 hr tablet    hydroCHLOROthiazide (HYDRODIURIL) 12.5 MG tablet    losartan (COZAAR) 100 MG tablet    Mixed hyperlipidemia    Overview     Taking simvastatin and lovaza.         Current Assessment & Plan      She will continue simvastatin and Lovaza.         Relevant Medications    simvastatin (ZOCOR) 20 MG tablet    omega-3 acid ethyl esters (LOVAZA) 1 g capsule       Genitourinary and Reproductive     Other female genital prolapse    Overview     Bladder prolapse.  Seeing Dr. Celis.  Planned hysterectomy 3/20/24.         Current Assessment & Plan     She will proceed with hysterectomy on 03/20/2024 by Dr. Radha Celis.            Heart-Healthy Eating Plan  Many factors influence your heart (coronary) health, including eating and exercise habits. Coronary risk increases with abnormal blood fat (lipid) levels. Heart-healthy meal planning includes limiting unhealthy fats, increasing healthy fats, and making other diet and lifestyle changes.  What is my plan?  Your health care provider may recommend that you:  Limit your fat intake to _________% or less of your total calories each  day.  Limit your saturated fat intake to _________% or less of your total calories each day.  Limit the amount of cholesterol in your diet to less than _________ mg per day.  What are tips for following this plan?  Cooking  Cook foods using methods other than frying. Baking, boiling, grilling, and broiling are all good options. Other ways to reduce fat include:  Removing the skin from poultry.  Removing all visible fats from meats.  Steaming vegetables in water or broth.  Meal planning  A plate with examples of foods in a healthy diet.      At meals, imagine dividing your plate into fourths:  Fill one-half of your plate with vegetables and green salads.  Fill one-fourth of your plate with whole grains.  Fill one-fourth of your plate with lean protein foods.  Eat 4-5 servings of vegetables per day. One serving equals 1 cup raw or cooked vegetable, or 2 cups raw leafy greens.  Eat 4-5 servings of fruit per day. One serving equals 1 medium whole fruit, ¼ cup dried fruit, ½ cup fresh, frozen, or canned fruit, or ½ cup 100% fruit juice.  Eat more foods that contain soluble fiber. Examples include apples, broccoli, carrots, beans, peas, and barley. Aim to get 25-30 g of fiber per day.  Increase your consumption of legumes, nuts, and seeds to 4-5 servings per week. One serving of dried beans or legumes equals ½ cup cooked, 1 serving of nuts is ¼ cup, and 1 serving of seeds equals 1 tablespoon.  Fats  Choose healthy fats more often. Choose monounsaturated and polyunsaturated fats, such as olive and canola oils, flaxseeds, walnuts, almonds, and seeds.  Eat more omega-3 fats. Choose salmon, mackerel, sardines, tuna, flaxseed oil, and ground flaxseeds. Aim to eat fish at least 2 times each week.  Check food labels carefully to identify foods with trans fats or high amounts of saturated fat.  Limit saturated fats. These are found in animal products, such as meats, butter, and cream. Plant sources of saturated fats include palm  oil, palm kernel oil, and coconut oil.  Avoid foods with partially hydrogenated oils in them. These contain trans fats. Examples are stick margarine, some tub margarines, cookies, crackers, and other baked goods.  Avoid fried foods.  General information  Eat more home-cooked food and less restaurant, buffet, and fast food.  Limit or avoid alcohol.  Limit foods that are high in starch and sugar.  Lose weight if you are overweight. Losing just 5-10% of your body weight can help your overall health and prevent diseases such as diabetes and heart disease.  Monitor your salt (sodium) intake, especially if you have high blood pressure. Talk with your health care provider about your sodium intake.  Try to incorporate more vegetarian meals weekly.  What foods can I eat?  Fruits  All fresh, canned (in natural juice), or frozen fruits.  Vegetables  Fresh or frozen vegetables (raw, steamed, roasted, or grilled). Green salads.  Grains  Most grains. Choose whole wheat and whole grains most of the time. Rice and pasta, including brown rice and pastas made with whole wheat.  Meats and other proteins  Lean, well-trimmed beef, veal, pork, and lamb. Chicken and turkey without skin. All fish and shellfish. Wild duck, rabbit, pheasant, and venison. Egg whites or low-cholesterol egg substitutes. Dried beans, peas, lentils, and tofu. Seeds and most nuts.  Dairy  Low-fat or nonfat cheeses, including ricotta and mozzarella. Skim or 1% milk (liquid, powdered, or evaporated). Buttermilk made with low-fat milk. Nonfat or low-fat yogurt.  Fats and oils  Non-hydrogenated (trans-free) margarines. Vegetable oils, including soybean, sesame, sunflower, olive, peanut, safflower, corn, canola, and cottonseed. Salad dressings or mayonnaise made with a vegetable oil.  Beverages  Water (mineral or sparkling). Coffee and tea. Diet carbonated beverages.  Sweets and desserts  Sherbet, gelatin, and fruit ice. Small amounts of dark chocolate.  Limit all  sweets and desserts.  Seasonings and condiments  All seasonings and condiments.  The items listed above may not be a complete list of foods and beverages you can eat. Contact a dietitian for more options.  What foods are not recommended?  Fruits  Canned fruit in heavy syrup. Fruit in cream or butter sauce. Fried fruit. Limit coconut.  Vegetables  Vegetables cooked in cheese, cream, or butter sauce. Fried vegetables.  Grains  Breads made with saturated or trans fats, oils, or whole milk. Croissants. Sweet rolls. Donuts. High-fat crackers, such as cheese crackers.  Meats and other proteins  Fatty meats, such as hot dogs, ribs, sausage, kelly, rib-eye roast or steak. High-fat deli meats, such as salami and bologna. Caviar. Domestic duck and goose. Organ meats, such as liver.  Dairy  Cream, sour cream, cream cheese, and creamed cottage cheese. Whole-milk cheeses. Whole or 2% milk (liquid, evaporated, or condensed). Whole buttermilk. Cream sauce or high-fat cheese sauce. Whole-milk yogurt.  Fats and oils  Meat fat, or shortening. Cocoa butter, hydrogenated oils, palm oil, coconut oil, palm kernel oil. Solid fats and shortenings, including kelly fat, salt pork, lard, and butter. Nondairy cream substitutes. Salad dressings with cheese or sour cream.  Beverages  Regular sodas and any drinks with added sugar.  Sweets and desserts  Frosting. Pudding. Cookies. Cakes. Pies. Milk chocolate or white chocolate. Buttered syrups. Full-fat ice cream or ice cream drinks.  The items listed above may not be a complete list of foods and beverages to avoid. Contact a dietitian for more information.  Summary  Heart-healthy meal planning includes limiting unhealthy fats, increasing healthy fats, and making other diet and lifestyle changes.  Lose weight if you are overweight. Losing just 5-10% of your body weight can help your overall health and prevent diseases such as diabetes and heart disease.  Focus on eating a balance of foods,  including fruits and vegetables, low-fat or nonfat dairy, lean protein, nuts and legumes, whole grains, and heart-healthy oils and fats.  This information is not intended to replace advice given to you by your health care provider. Make sure you discuss any questions you have with your health care provider.  Document Revised: 04/28/2022 Document Reviewed: 04/28/2022  [employer] Patient Education © 2022 [employer]

## 2024-03-18 ENCOUNTER — PRE-ADMISSION TESTING (OUTPATIENT)
Dept: PREADMISSION TESTING | Facility: HOSPITAL | Age: 84
End: 2024-03-18
Payer: MEDICARE

## 2024-03-18 VITALS — BODY MASS INDEX: 26.01 KG/M2 | HEIGHT: 62 IN | WEIGHT: 141.31 LBS

## 2024-03-18 DIAGNOSIS — N81.4 UTERINE PROLAPSE: ICD-10-CM

## 2024-03-18 DIAGNOSIS — Z01.818 PREOPERATIVE TESTING: ICD-10-CM

## 2024-03-18 LAB
ANION GAP SERPL CALCULATED.3IONS-SCNC: 10 MMOL/L (ref 5–15)
BASOPHILS # BLD AUTO: 0.04 10*3/MM3 (ref 0–0.2)
BASOPHILS NFR BLD AUTO: 0.7 % (ref 0–1.5)
BUN SERPL-MCNC: 15 MG/DL (ref 8–23)
BUN/CREAT SERPL: 18.8 (ref 7–25)
CALCIUM SPEC-SCNC: 9.9 MG/DL (ref 8.6–10.5)
CHLORIDE SERPL-SCNC: 96 MMOL/L (ref 98–107)
CO2 SERPL-SCNC: 32 MMOL/L (ref 22–29)
CREAT SERPL-MCNC: 0.8 MG/DL (ref 0.57–1)
DEPRECATED RDW RBC AUTO: 40.2 FL (ref 37–54)
EGFRCR SERPLBLD CKD-EPI 2021: 73.7 ML/MIN/1.73
EOSINOPHIL # BLD AUTO: 0.06 10*3/MM3 (ref 0–0.4)
EOSINOPHIL NFR BLD AUTO: 1 % (ref 0.3–6.2)
ERYTHROCYTE [DISTWIDTH] IN BLOOD BY AUTOMATED COUNT: 12.2 % (ref 12.3–15.4)
GLUCOSE SERPL-MCNC: 118 MG/DL (ref 65–99)
HCT VFR BLD AUTO: 42.7 % (ref 34–46.6)
HGB BLD-MCNC: 14.4 G/DL (ref 12–15.9)
IMM GRANULOCYTES # BLD AUTO: 0.01 10*3/MM3 (ref 0–0.05)
IMM GRANULOCYTES NFR BLD AUTO: 0.2 % (ref 0–0.5)
LYMPHOCYTES # BLD AUTO: 1.81 10*3/MM3 (ref 0.7–3.1)
LYMPHOCYTES NFR BLD AUTO: 30.1 % (ref 19.6–45.3)
MCH RBC QN AUTO: 30 PG (ref 26.6–33)
MCHC RBC AUTO-ENTMCNC: 33.7 G/DL (ref 31.5–35.7)
MCV RBC AUTO: 89 FL (ref 79–97)
MONOCYTES # BLD AUTO: 0.66 10*3/MM3 (ref 0.1–0.9)
MONOCYTES NFR BLD AUTO: 11 % (ref 5–12)
NEUTROPHILS NFR BLD AUTO: 3.44 10*3/MM3 (ref 1.7–7)
NEUTROPHILS NFR BLD AUTO: 57 % (ref 42.7–76)
NRBC BLD AUTO-RTO: 0 /100 WBC (ref 0–0.2)
PLATELET # BLD AUTO: 185 10*3/MM3 (ref 140–450)
PMV BLD AUTO: 9.8 FL (ref 6–12)
POTASSIUM SERPL-SCNC: 4.2 MMOL/L (ref 3.5–5.2)
RBC # BLD AUTO: 4.8 10*6/MM3 (ref 3.77–5.28)
SODIUM SERPL-SCNC: 138 MMOL/L (ref 136–145)
WBC NRBC COR # BLD AUTO: 6.02 10*3/MM3 (ref 3.4–10.8)

## 2024-03-18 PROCEDURE — 80048 BASIC METABOLIC PNL TOTAL CA: CPT

## 2024-03-18 PROCEDURE — 85025 COMPLETE CBC W/AUTO DIFF WBC: CPT

## 2024-03-18 PROCEDURE — 36415 COLL VENOUS BLD VENIPUNCTURE: CPT

## 2024-03-18 RX ORDER — METOPROLOL SUCCINATE 50 MG/1
75 TABLET, EXTENDED RELEASE ORAL 2 TIMES DAILY
Qty: 270 TABLET | Refills: 1 | Status: SHIPPED | OUTPATIENT
Start: 2024-03-18

## 2024-03-18 RX ORDER — METOPROLOL SUCCINATE 50 MG/1
50 TABLET, EXTENDED RELEASE ORAL NIGHTLY
COMMUNITY

## 2024-03-18 RX ORDER — SODIUM CHLORIDE 9 MG/ML
40 INJECTION, SOLUTION INTRAVENOUS AS NEEDED
Status: CANCELLED | OUTPATIENT
Start: 2024-03-18

## 2024-03-18 NOTE — PAT
Patient to apply Chlorhexadine wipes  to surgical area (as instructed) the night before procedure and the AM of procedure. Wipes provided.    Per Anesthesia Request, patient instructed not to take their ACE/ARB medications on the AM of surgery.    Patient instructed to drink 20 ounces of Gatorade or Gatorlyte (if diabetic) and it needs to be completed 1 hour (for Main OR patients) or 2 hours (scheduled  section & [facility] patients) before given arrival time for procedure (NO RED Gatorade and NO Gatorade Zero).    Patient verbalized understanding.    EKG from  and history and PCP note from 3-2024 reviewed with brenda Torres to proceed with surgery.     Patient did not review general PAT education video as instructed in their preoperative information received from their surgeon.  One-on-one Pre Admission Testing general education provided during PAT visit.  Copies of PAT general education handouts (Incentive Spirometry, Meds to Beds Program, Patient Belongings, Pre-op skin preparation instructions, Blood Glucose testing, Visitor policy, Surgery FAQ, Code H) distributed to patient. Encouraged patient/family to read PAT general education handouts thoroughly and notify PAT staff with any questions or concerns. Patient instructed to bring PAT pass and completed skin prep sheet (if applicable) on the day of procedure. Patient verbalized understanding of all information and priority content.

## 2024-03-19 ENCOUNTER — TELEPHONE (OUTPATIENT)
Dept: CASE MANAGEMENT | Facility: OTHER | Age: 84
End: 2024-03-19
Payer: MEDICARE

## 2024-03-19 NOTE — TELEPHONE ENCOUNTER
RN-ACM made outreach to patient regarding HTN program. No answer, unable to leave message. 3 unsuccessful attempts have been made to reach patient.    No f/u outreach scheduled.

## 2024-03-20 ENCOUNTER — ANESTHESIA (OUTPATIENT)
Dept: PERIOP | Facility: HOSPITAL | Age: 84
End: 2024-03-20
Payer: MEDICARE

## 2024-03-20 ENCOUNTER — HOSPITAL ENCOUNTER (OUTPATIENT)
Facility: HOSPITAL | Age: 84
Discharge: HOME | End: 2024-03-21
Attending: OBSTETRICS & GYNECOLOGY | Admitting: OBSTETRICS & GYNECOLOGY
Payer: MEDICARE

## 2024-03-20 ENCOUNTER — ANESTHESIA EVENT (OUTPATIENT)
Dept: PERIOP | Facility: HOSPITAL | Age: 84
End: 2024-03-20
Payer: MEDICARE

## 2024-03-20 DIAGNOSIS — N81.4 UTERINE PROLAPSE: Primary | ICD-10-CM

## 2024-03-20 DIAGNOSIS — Z01.818 PREOPERATIVE TESTING: ICD-10-CM

## 2024-03-20 DIAGNOSIS — K21.9 GERD WITHOUT ESOPHAGITIS: ICD-10-CM

## 2024-03-20 PROBLEM — Z90.710 S/P HYSTERECTOMY: Status: ACTIVE | Noted: 2024-03-20

## 2024-03-20 PROCEDURE — 25010000002 FENTANYL CITRATE (PF) 100 MCG/2ML SOLUTION: Performed by: NURSE ANESTHETIST, CERTIFIED REGISTERED

## 2024-03-20 PROCEDURE — 63710000001 FAMOTIDINE 20 MG TABLET: Performed by: OBSTETRICS & GYNECOLOGY

## 2024-03-20 PROCEDURE — A9270 NON-COVERED ITEM OR SERVICE: HCPCS | Performed by: OBSTETRICS & GYNECOLOGY

## 2024-03-20 PROCEDURE — 25010000002 CEFAZOLIN PER 500 MG: Performed by: OBSTETRICS & GYNECOLOGY

## 2024-03-20 PROCEDURE — 25010000002 ONDANSETRON PER 1 MG: Performed by: NURSE ANESTHETIST, CERTIFIED REGISTERED

## 2024-03-20 PROCEDURE — 25010000002 CEFAZOLIN PER 500 MG: Performed by: PHYSICIAN ASSISTANT

## 2024-03-20 PROCEDURE — 25810000003 LACTATED RINGERS PER 1000 ML: Performed by: OBSTETRICS & GYNECOLOGY

## 2024-03-20 PROCEDURE — 88307 TISSUE EXAM BY PATHOLOGIST: CPT | Performed by: OBSTETRICS & GYNECOLOGY

## 2024-03-20 PROCEDURE — 25010000002 HEPARIN (PORCINE) PER 1000 UNITS: Performed by: OBSTETRICS & GYNECOLOGY

## 2024-03-20 PROCEDURE — 63710000001 HYDROCHLOROTHIAZIDE 12.5 MG CAPSULE: Performed by: OBSTETRICS & GYNECOLOGY

## 2024-03-20 PROCEDURE — 25010000002 DEXAMETHASONE PER 1 MG: Performed by: NURSE ANESTHETIST, CERTIFIED REGISTERED

## 2024-03-20 PROCEDURE — 25810000003 LACTATED RINGERS PER 1000 ML: Performed by: ANESTHESIOLOGY

## 2024-03-20 PROCEDURE — 63710000001 HYDROCODONE-ACETAMINOPHEN 5-325 MG TABLET: Performed by: OBSTETRICS & GYNECOLOGY

## 2024-03-20 PROCEDURE — 25010000002 SUGAMMADEX 200 MG/2ML SOLUTION: Performed by: ANESTHESIOLOGY

## 2024-03-20 PROCEDURE — 25010000002 PROPOFOL 10 MG/ML EMULSION: Performed by: NURSE ANESTHETIST, CERTIFIED REGISTERED

## 2024-03-20 PROCEDURE — 25010000002 KETOROLAC TROMETHAMINE PER 15 MG: Performed by: OBSTETRICS & GYNECOLOGY

## 2024-03-20 PROCEDURE — 63710000001 LOSARTAN 50 MG TABLET: Performed by: OBSTETRICS & GYNECOLOGY

## 2024-03-20 PROCEDURE — 63710000001 ESTROGENS CONJUGATED 0.625 MG/GM CREAM 30 G TUBE: Performed by: OBSTETRICS & GYNECOLOGY

## 2024-03-20 PROCEDURE — 25010000002 FENTANYL CITRATE (PF) 50 MCG/ML SOLUTION

## 2024-03-20 RX ORDER — LIDOCAINE HYDROCHLORIDE 10 MG/ML
0.5 INJECTION, SOLUTION EPIDURAL; INFILTRATION; INTRACAUDAL; PERINEURAL ONCE AS NEEDED
Status: COMPLETED | OUTPATIENT
Start: 2024-03-20 | End: 2024-03-20

## 2024-03-20 RX ORDER — ACETAMINOPHEN 325 MG/1
650 TABLET ORAL EVERY 4 HOURS PRN
Status: CANCELLED | OUTPATIENT
Start: 2024-03-20

## 2024-03-20 RX ORDER — MAGNESIUM HYDROXIDE 1200 MG/15ML
LIQUID ORAL AS NEEDED
Status: DISCONTINUED | OUTPATIENT
Start: 2024-03-20 | End: 2024-03-20 | Stop reason: HOSPADM

## 2024-03-20 RX ORDER — HEPARIN SODIUM 5000 [USP'U]/ML
INJECTION, SOLUTION INTRAVENOUS; SUBCUTANEOUS AS NEEDED
Status: DISCONTINUED | OUTPATIENT
Start: 2024-03-20 | End: 2024-03-20 | Stop reason: HOSPADM

## 2024-03-20 RX ORDER — LIDOCAINE HYDROCHLORIDE 10 MG/ML
INJECTION, SOLUTION EPIDURAL; INFILTRATION; INTRACAUDAL; PERINEURAL AS NEEDED
Status: DISCONTINUED | OUTPATIENT
Start: 2024-03-20 | End: 2024-03-20 | Stop reason: SURG

## 2024-03-20 RX ORDER — TEMAZEPAM 7.5 MG/1
7.5 CAPSULE ORAL NIGHTLY PRN
Status: DISCONTINUED | OUTPATIENT
Start: 2024-03-20 | End: 2024-03-21 | Stop reason: HOSPADM

## 2024-03-20 RX ORDER — POLYETHYLENE GLYCOL 3350 17 G/17G
17 POWDER, FOR SOLUTION ORAL DAILY PRN
Status: DISCONTINUED | OUTPATIENT
Start: 2024-03-20 | End: 2024-03-21 | Stop reason: HOSPADM

## 2024-03-20 RX ORDER — SODIUM CHLORIDE 9 MG/ML
40 INJECTION, SOLUTION INTRAVENOUS AS NEEDED
Status: DISCONTINUED | OUTPATIENT
Start: 2024-03-20 | End: 2024-03-20 | Stop reason: HOSPADM

## 2024-03-20 RX ORDER — SIMETHICONE 80 MG
80 TABLET,CHEWABLE ORAL 4 TIMES DAILY PRN
Status: DISCONTINUED | OUTPATIENT
Start: 2024-03-20 | End: 2024-03-21 | Stop reason: HOSPADM

## 2024-03-20 RX ORDER — HYDROCHLOROTHIAZIDE 12.5 MG/1
12.5 TABLET ORAL DAILY
Status: DISCONTINUED | OUTPATIENT
Start: 2024-03-20 | End: 2024-03-21 | Stop reason: HOSPADM

## 2024-03-20 RX ORDER — HYDROCODONE BITARTRATE AND ACETAMINOPHEN 5; 325 MG/1; MG/1
1 TABLET ORAL EVERY 4 HOURS PRN
Status: DISCONTINUED | OUTPATIENT
Start: 2024-03-20 | End: 2024-03-21 | Stop reason: HOSPADM

## 2024-03-20 RX ORDER — NALOXONE HCL 0.4 MG/ML
0.4 VIAL (ML) INJECTION
Status: DISCONTINUED | OUTPATIENT
Start: 2024-03-20 | End: 2024-03-21 | Stop reason: HOSPADM

## 2024-03-20 RX ORDER — SODIUM CHLORIDE 0.9 % (FLUSH) 0.9 %
10 SYRINGE (ML) INJECTION AS NEEDED
Status: DISCONTINUED | OUTPATIENT
Start: 2024-03-20 | End: 2024-03-20 | Stop reason: HOSPADM

## 2024-03-20 RX ORDER — ACETAMINOPHEN 650 MG/1
650 SUPPOSITORY RECTAL EVERY 4 HOURS PRN
Status: CANCELLED | OUTPATIENT
Start: 2024-03-20

## 2024-03-20 RX ORDER — PROPOFOL 10 MG/ML
VIAL (ML) INTRAVENOUS AS NEEDED
Status: DISCONTINUED | OUTPATIENT
Start: 2024-03-20 | End: 2024-03-20 | Stop reason: SURG

## 2024-03-20 RX ORDER — HYDROCODONE BITARTRATE AND ACETAMINOPHEN 5; 325 MG/1; MG/1
2 TABLET ORAL EVERY 4 HOURS PRN
Status: DISCONTINUED | OUTPATIENT
Start: 2024-03-20 | End: 2024-03-21 | Stop reason: HOSPADM

## 2024-03-20 RX ORDER — FENTANYL CITRATE 50 UG/ML
INJECTION, SOLUTION INTRAMUSCULAR; INTRAVENOUS AS NEEDED
Status: DISCONTINUED | OUTPATIENT
Start: 2024-03-20 | End: 2024-03-20 | Stop reason: SURG

## 2024-03-20 RX ORDER — NALOXONE HCL 0.4 MG/ML
0.4 VIAL (ML) INJECTION AS NEEDED
Status: DISCONTINUED | OUTPATIENT
Start: 2024-03-20 | End: 2024-03-20 | Stop reason: HOSPADM

## 2024-03-20 RX ORDER — DEXAMETHASONE SODIUM PHOSPHATE 4 MG/ML
INJECTION, SOLUTION INTRA-ARTICULAR; INTRALESIONAL; INTRAMUSCULAR; INTRAVENOUS; SOFT TISSUE AS NEEDED
Status: DISCONTINUED | OUTPATIENT
Start: 2024-03-20 | End: 2024-03-20 | Stop reason: SURG

## 2024-03-20 RX ORDER — SODIUM CHLORIDE, SODIUM LACTATE, POTASSIUM CHLORIDE, CALCIUM CHLORIDE 600; 310; 30; 20 MG/100ML; MG/100ML; MG/100ML; MG/100ML
100 INJECTION, SOLUTION INTRAVENOUS CONTINUOUS
Status: DISCONTINUED | OUTPATIENT
Start: 2024-03-20 | End: 2024-03-21 | Stop reason: HOSPADM

## 2024-03-20 RX ORDER — SODIUM CHLORIDE 0.9 % (FLUSH) 0.9 %
10 SYRINGE (ML) INJECTION EVERY 12 HOURS SCHEDULED
Status: DISCONTINUED | OUTPATIENT
Start: 2024-03-20 | End: 2024-03-20 | Stop reason: HOSPADM

## 2024-03-20 RX ORDER — FENTANYL CITRATE 50 UG/ML
INJECTION, SOLUTION INTRAMUSCULAR; INTRAVENOUS
Status: COMPLETED
Start: 2024-03-20 | End: 2024-03-20

## 2024-03-20 RX ORDER — SODIUM CHLORIDE 0.9 % (FLUSH) 0.9 %
3 SYRINGE (ML) INJECTION EVERY 12 HOURS SCHEDULED
Status: DISCONTINUED | OUTPATIENT
Start: 2024-03-20 | End: 2024-03-20 | Stop reason: HOSPADM

## 2024-03-20 RX ORDER — KETOROLAC TROMETHAMINE 15 MG/ML
15 INJECTION, SOLUTION INTRAMUSCULAR; INTRAVENOUS ONCE
Status: COMPLETED | OUTPATIENT
Start: 2024-03-20 | End: 2024-03-20

## 2024-03-20 RX ORDER — DROPERIDOL 2.5 MG/ML
0.62 INJECTION, SOLUTION INTRAMUSCULAR; INTRAVENOUS ONCE AS NEEDED
Status: DISCONTINUED | OUTPATIENT
Start: 2024-03-20 | End: 2024-03-20 | Stop reason: HOSPADM

## 2024-03-20 RX ORDER — ONDANSETRON 2 MG/ML
INJECTION INTRAMUSCULAR; INTRAVENOUS AS NEEDED
Status: DISCONTINUED | OUTPATIENT
Start: 2024-03-20 | End: 2024-03-20 | Stop reason: SURG

## 2024-03-20 RX ORDER — IBUPROFEN 600 MG/1
600 TABLET ORAL EVERY 6 HOURS PRN
Status: DISCONTINUED | OUTPATIENT
Start: 2024-03-20 | End: 2024-03-21 | Stop reason: HOSPADM

## 2024-03-20 RX ORDER — ROCURONIUM BROMIDE 10 MG/ML
INJECTION, SOLUTION INTRAVENOUS AS NEEDED
Status: DISCONTINUED | OUTPATIENT
Start: 2024-03-20 | End: 2024-03-20 | Stop reason: SURG

## 2024-03-20 RX ORDER — SODIUM CHLORIDE, SODIUM LACTATE, POTASSIUM CHLORIDE, CALCIUM CHLORIDE 600; 310; 30; 20 MG/100ML; MG/100ML; MG/100ML; MG/100ML
9 INJECTION, SOLUTION INTRAVENOUS CONTINUOUS PRN
Status: DISCONTINUED | OUTPATIENT
Start: 2024-03-20 | End: 2024-03-21 | Stop reason: HOSPADM

## 2024-03-20 RX ORDER — IPRATROPIUM BROMIDE AND ALBUTEROL SULFATE 2.5; .5 MG/3ML; MG/3ML
3 SOLUTION RESPIRATORY (INHALATION) ONCE AS NEEDED
Status: DISCONTINUED | OUTPATIENT
Start: 2024-03-20 | End: 2024-03-20 | Stop reason: HOSPADM

## 2024-03-20 RX ORDER — LOSARTAN POTASSIUM 50 MG/1
100 TABLET ORAL DAILY
Status: DISCONTINUED | OUTPATIENT
Start: 2024-03-20 | End: 2024-03-21 | Stop reason: HOSPADM

## 2024-03-20 RX ORDER — FAMOTIDINE 20 MG/1
20 TABLET, FILM COATED ORAL
Status: COMPLETED | OUTPATIENT
Start: 2024-03-20 | End: 2024-03-20

## 2024-03-20 RX ORDER — HYDRALAZINE HYDROCHLORIDE 20 MG/ML
5 INJECTION INTRAMUSCULAR; INTRAVENOUS
Status: DISCONTINUED | OUTPATIENT
Start: 2024-03-20 | End: 2024-03-20 | Stop reason: HOSPADM

## 2024-03-20 RX ORDER — PROMETHAZINE HYDROCHLORIDE 25 MG/1
25 SUPPOSITORY RECTAL ONCE AS NEEDED
Status: DISCONTINUED | OUTPATIENT
Start: 2024-03-20 | End: 2024-03-20 | Stop reason: HOSPADM

## 2024-03-20 RX ORDER — FENTANYL CITRATE 50 UG/ML
50 INJECTION, SOLUTION INTRAMUSCULAR; INTRAVENOUS
Status: DISCONTINUED | OUTPATIENT
Start: 2024-03-20 | End: 2024-03-20 | Stop reason: HOSPADM

## 2024-03-20 RX ORDER — HYDROCODONE BITARTRATE AND ACETAMINOPHEN 5; 325 MG/1; MG/1
1 TABLET ORAL ONCE AS NEEDED
Status: DISCONTINUED | OUTPATIENT
Start: 2024-03-20 | End: 2024-03-20 | Stop reason: HOSPADM

## 2024-03-20 RX ORDER — DROPERIDOL 2.5 MG/ML
0.62 INJECTION, SOLUTION INTRAMUSCULAR; INTRAVENOUS
Status: DISCONTINUED | OUTPATIENT
Start: 2024-03-20 | End: 2024-03-20 | Stop reason: HOSPADM

## 2024-03-20 RX ORDER — SODIUM CHLORIDE 0.9 % (FLUSH) 0.9 %
3-10 SYRINGE (ML) INJECTION AS NEEDED
Status: DISCONTINUED | OUTPATIENT
Start: 2024-03-20 | End: 2024-03-20 | Stop reason: HOSPADM

## 2024-03-20 RX ORDER — FAMOTIDINE 20 MG/1
20 TABLET, FILM COATED ORAL DAILY
Status: DISCONTINUED | OUTPATIENT
Start: 2024-03-20 | End: 2024-03-21 | Stop reason: HOSPADM

## 2024-03-20 RX ORDER — EPHEDRINE SULFATE 50 MG/ML
INJECTION INTRAVENOUS AS NEEDED
Status: DISCONTINUED | OUTPATIENT
Start: 2024-03-20 | End: 2024-03-20 | Stop reason: SURG

## 2024-03-20 RX ORDER — HYDROMORPHONE HYDROCHLORIDE 1 MG/ML
0.5 INJECTION, SOLUTION INTRAMUSCULAR; INTRAVENOUS; SUBCUTANEOUS
Status: DISCONTINUED | OUTPATIENT
Start: 2024-03-20 | End: 2024-03-20 | Stop reason: HOSPADM

## 2024-03-20 RX ORDER — LABETALOL HYDROCHLORIDE 5 MG/ML
5 INJECTION, SOLUTION INTRAVENOUS
Status: DISCONTINUED | OUTPATIENT
Start: 2024-03-20 | End: 2024-03-20 | Stop reason: HOSPADM

## 2024-03-20 RX ORDER — MIDAZOLAM HYDROCHLORIDE 1 MG/ML
0.5 INJECTION INTRAMUSCULAR; INTRAVENOUS
Status: DISCONTINUED | OUTPATIENT
Start: 2024-03-20 | End: 2024-03-20 | Stop reason: HOSPADM

## 2024-03-20 RX ORDER — LATANOPROST 50 UG/ML
1 SOLUTION/ DROPS OPHTHALMIC NIGHTLY
Status: DISCONTINUED | OUTPATIENT
Start: 2024-03-20 | End: 2024-03-21 | Stop reason: HOSPADM

## 2024-03-20 RX ORDER — ONDANSETRON 4 MG/1
4 TABLET, ORALLY DISINTEGRATING ORAL EVERY 6 HOURS PRN
Status: DISCONTINUED | OUTPATIENT
Start: 2024-03-20 | End: 2024-03-21 | Stop reason: HOSPADM

## 2024-03-20 RX ORDER — ONDANSETRON 2 MG/ML
4 INJECTION INTRAMUSCULAR; INTRAVENOUS EVERY 6 HOURS PRN
Status: DISCONTINUED | OUTPATIENT
Start: 2024-03-20 | End: 2024-03-21 | Stop reason: HOSPADM

## 2024-03-20 RX ORDER — TIMOLOL MALEATE 5 MG/ML
1 SOLUTION/ DROPS OPHTHALMIC DAILY
Status: DISCONTINUED | OUTPATIENT
Start: 2024-03-20 | End: 2024-03-21 | Stop reason: HOSPADM

## 2024-03-20 RX ORDER — HEPARIN SODIUM 5000 [USP'U]/ML
5000 INJECTION, SOLUTION INTRAVENOUS; SUBCUTANEOUS EVERY 8 HOURS SCHEDULED
Status: DISCONTINUED | OUTPATIENT
Start: 2024-03-20 | End: 2024-03-20 | Stop reason: HOSPADM

## 2024-03-20 RX ORDER — ACETAMINOPHEN 160 MG/5ML
650 SOLUTION ORAL EVERY 4 HOURS PRN
Status: CANCELLED | OUTPATIENT
Start: 2024-03-20

## 2024-03-20 RX ORDER — ONDANSETRON 2 MG/ML
4 INJECTION INTRAMUSCULAR; INTRAVENOUS ONCE AS NEEDED
Status: DISCONTINUED | OUTPATIENT
Start: 2024-03-20 | End: 2024-03-20 | Stop reason: HOSPADM

## 2024-03-20 RX ORDER — HYDROCODONE BITARTRATE AND ACETAMINOPHEN 5; 325 MG/1; MG/1
1 TABLET ORAL EVERY 4 HOURS PRN
Status: DISCONTINUED | OUTPATIENT
Start: 2024-03-20 | End: 2024-03-20

## 2024-03-20 RX ORDER — PROMETHAZINE HYDROCHLORIDE 25 MG/1
25 TABLET ORAL ONCE AS NEEDED
Status: DISCONTINUED | OUTPATIENT
Start: 2024-03-20 | End: 2024-03-20 | Stop reason: HOSPADM

## 2024-03-20 RX ORDER — MORPHINE SULFATE 2 MG/ML
1 INJECTION, SOLUTION INTRAMUSCULAR; INTRAVENOUS EVERY 4 HOURS PRN
Status: DISCONTINUED | OUTPATIENT
Start: 2024-03-20 | End: 2024-03-21 | Stop reason: HOSPADM

## 2024-03-20 RX ADMIN — SODIUM CHLORIDE, POTASSIUM CHLORIDE, SODIUM LACTATE AND CALCIUM CHLORIDE 9 ML/HR: 600; 310; 30; 20 INJECTION, SOLUTION INTRAVENOUS at 13:22

## 2024-03-20 RX ADMIN — FENTANYL CITRATE 50 MCG: 50 INJECTION, SOLUTION INTRAMUSCULAR; INTRAVENOUS at 14:41

## 2024-03-20 RX ADMIN — FENTANYL CITRATE 50 MCG: 50 INJECTION, SOLUTION INTRAMUSCULAR; INTRAVENOUS at 17:15

## 2024-03-20 RX ADMIN — SUGAMMADEX 200 MG: 100 INJECTION, SOLUTION INTRAVENOUS at 16:23

## 2024-03-20 RX ADMIN — SODIUM CHLORIDE, POTASSIUM CHLORIDE, SODIUM LACTATE AND CALCIUM CHLORIDE 100 ML/HR: 600; 310; 30; 20 INJECTION, SOLUTION INTRAVENOUS at 18:44

## 2024-03-20 RX ADMIN — EPHEDRINE SULFATE 5 MG: 50 INJECTION INTRAVENOUS at 15:50

## 2024-03-20 RX ADMIN — EPHEDRINE SULFATE 10 MG: 50 INJECTION INTRAVENOUS at 15:06

## 2024-03-20 RX ADMIN — LIDOCAINE HYDROCHLORIDE 100 MG: 10 INJECTION, SOLUTION EPIDURAL; INFILTRATION; INTRACAUDAL; PERINEURAL at 14:42

## 2024-03-20 RX ADMIN — ONDANSETRON 4 MG: 2 INJECTION INTRAMUSCULAR; INTRAVENOUS at 15:11

## 2024-03-20 RX ADMIN — DEXAMETHASONE SODIUM PHOSPHATE 4 MG: 4 INJECTION INTRA-ARTICULAR; INTRALESIONAL; INTRAMUSCULAR; INTRAVENOUS; SOFT TISSUE at 15:11

## 2024-03-20 RX ADMIN — HYDROCODONE BITARTRATE AND ACETAMINOPHEN 1 TABLET: 5; 325 TABLET ORAL at 19:54

## 2024-03-20 RX ADMIN — FAMOTIDINE 20 MG: 20 TABLET, FILM COATED ORAL at 13:22

## 2024-03-20 RX ADMIN — LIDOCAINE HYDROCHLORIDE 0.5 ML: 10 INJECTION, SOLUTION EPIDURAL; INFILTRATION; INTRACAUDAL; PERINEURAL at 13:22

## 2024-03-20 RX ADMIN — KETOROLAC TROMETHAMINE 15 MG: 15 INJECTION, SOLUTION INTRAMUSCULAR; INTRAVENOUS at 22:41

## 2024-03-20 RX ADMIN — FENTANYL CITRATE 25 MCG: 50 INJECTION, SOLUTION INTRAMUSCULAR; INTRAVENOUS at 14:38

## 2024-03-20 RX ADMIN — CEFAZOLIN SODIUM 2 G: 1 INJECTION, POWDER, FOR SOLUTION INTRAMUSCULAR; INTRAVENOUS at 14:30

## 2024-03-20 RX ADMIN — FENTANYL CITRATE 25 MCG: 50 INJECTION, SOLUTION INTRAMUSCULAR; INTRAVENOUS at 14:30

## 2024-03-20 RX ADMIN — HYDROCHLOROTHIAZIDE 12.5 MG: 12.5 CAPSULE ORAL at 19:52

## 2024-03-20 RX ADMIN — SODIUM CHLORIDE 2000 MG: 900 INJECTION INTRAVENOUS at 22:42

## 2024-03-20 RX ADMIN — FAMOTIDINE 20 MG: 20 TABLET, FILM COATED ORAL at 19:52

## 2024-03-20 RX ADMIN — LOSARTAN POTASSIUM 100 MG: 50 TABLET, FILM COATED ORAL at 19:50

## 2024-03-20 RX ADMIN — PROPOFOL 80 MG: 10 INJECTION, EMULSION INTRAVENOUS at 14:43

## 2024-03-20 RX ADMIN — ROCURONIUM BROMIDE 50 MG: 10 INJECTION INTRAVENOUS at 14:45

## 2024-03-20 RX ADMIN — EPHEDRINE SULFATE 10 MG: 50 INJECTION INTRAVENOUS at 16:11

## 2024-03-20 NOTE — ANESTHESIA PROCEDURE NOTES
Airway  Urgency: elective    Date/Time: 3/20/2024 2:47 PM  Airway not difficult    General Information and Staff    Patient location during procedure: OR    Indications and Patient Condition  Indications for airway management: airway protection    Preoxygenated: yes  MILS not maintained throughout  Mask difficulty assessment: 2 - vent by mask + OA or adjuvant +/- NMBA    Final Airway Details  Final airway type: endotracheal airway      Successful airway: ETT  Cuffed: yes   Successful intubation technique: direct laryngoscopy and video laryngoscopy  Facilitating devices/methods: intubating stylet  Endotracheal tube insertion site: oral  Blade: Bethea  Blade size: 3  ETT size (mm): 7.0  Cormack-Lehane Classification: grade I - full view of glottis  Placement verified by: chest auscultation and capnometry   Inital cuff pressure (cm H2O): 7  Measured from: lips  ETT/EBT  to lips (cm): 21  Number of attempts at approach: 1  Assessment: lips, teeth, and gum same as pre-op and atraumatic intubation    Additional Comments  Negative epigastric sounds, Breath sound equal bilaterally with symmetric chest rise and fall; first DL with Dunbar 2 and grade 3 view

## 2024-03-20 NOTE — ANESTHESIA PREPROCEDURE EVALUATION
Anesthesia Evaluation     Patient summary reviewed and Nursing notes reviewed   history of anesthetic complications:  PONV               Airway   Mallampati: III  TM distance: >3 FB  Neck ROM: full  Possible difficult intubation and Small opening  Dental - normal exam     Pulmonary - negative pulmonary ROS and normal exam   Cardiovascular - normal exam    (+) hypertension, hyperlipidemia      Neuro/Psych- negative ROS  GI/Hepatic/Renal/Endo    (+) GERD    Musculoskeletal (-) negative ROS    Abdominal  - normal exam    Bowel sounds: normal.   Substance History - negative use     OB/GYN negative ob/gyn ROS         Other                      Anesthesia Plan    ASA 2     general     intravenous induction     Anesthetic plan, risks, benefits, and alternatives have been provided, discussed and informed consent has been obtained with: patient.    Plan discussed with CRNA.      CODE STATUS:

## 2024-03-20 NOTE — OP NOTE
GYN Operative Note    Patient Name, age and sex:  Amanda Lawson, 83 y.o., female  Procedure Date:  3/20/2024    Surgeons and Role:     * Radha Celis MD - Primary    Additional Staff:  Mariam TYLER  Medically necessary for assistance.Complex retraction. Suturing skills.Complex and critical patient postioning.                     Pre-op diagnosis: Uterine prolapse, cystocele, rectocele       Postop diagnosis: Same                                                                          * No Diagnosis Codes entered *    * No Diagnosis Codes entered *    Procedure(s):  TOTAL VAGINAL HYSTERECTOMY, ANTERIOR AND POSTERIOR COLPORRHAPHY VAGINAL REPAIR, UTEROSACRAL LIGAMENT VAGINAL VAULT SUSPENSION, CYSTO  CYSTOSCOPY    Indications for Operation: Patient is a 83-year-old active female who has significant vaginal prolapse with activity who desired to proceed with surgical management and had a failed pessary attempt.  Pessaries tried multiple sizes shapes would not stay in place.  Caused significant discomfort as well.  The response alternatives for surgical management including injury to the internal organs bowel bladder ureters bladder etc. was given.  Risk of bleeding infection recurrence of prolapse or other medical surgical complications.  Patient agrees consents to procedure and the risk thereof.    Antibiotics:  cefazolin (Ancef) ordered on call to OR    Anesthesia:  Type: General -   ASA:  II    Operative Findings: The uterus was prolapsed to the hymenal ring.  The genital hiatus was somewhat widened with 1/3 degree cystocele as well.  Second-degree rectocele.  The bladder was normal by cystoscopy.  Before and after the uterosacral ligament suspension no sign of ureteral obstruction noted.  Pyridium stained urine seen flowing through the ureter openings.  Rectal exam was negative for defects or sutures during and after repair.    Specimens Removed:    Specimens       ID Source Type Tests Collected  By Collected At Frozen?    A Uterus with Cervix Tissue TISSUE PATHOLOGY EXAM   Radha Celis MD 3/20/24 6438             Estimated Blood Loss: Minimal mL    Urine Output: 300 mL    Complications: None    Procedure Narrative: Patient was taken the operating room where general anesthesia was found to be adequate.  She was then prepped and draped normal sterile fashion dorsolithotomy position in the [facility].  She was placed at 90 degree lithotomy only with no hyperflexion.  The Lambert catheter was placed and timeout was performed.  The cervix grasped with an tenaculum.  The cervical vaginal junction injected with a local with injectable saline solution.  A Bovie was then used to transect the cervix from the cuff in a circumferential manner.  The bladder was dissected off anteriorly and cul-de-sac entered posteriorly.  The anterior peritoneum was entered sharply after picking this up with DeBakey's and then the right ankle was pleura placed intraperitoneal.  Same with the posterior cul-de-sac.  Then the uterosacral ligaments were then clamped with curved Eugenio clamped transected suture ligated with 0 Vicryl suture.  The next uterine vessels were clamped with a curved Eugenio clamped transected and suture-ligated with 0 Vicryl suture.  The successive bites of each right and left side were done until the entire uterus was removed through the vagina after clamping the utero-ovarian pedicle.  The ovaries were palpated they seem normal and were not removed.  Hemostasis along the pedicle line was then reassured.  Then the uterosacral ligament suspension's were placed.  Using bowel packing with a baby laps the bowel was moved into the upper abdomen.  The uterosacral ligament placed on stretch and a 0 Vicryl was placed from the distal uterosacral ligament through the posterior vaginal cuff for later suspension.  A 0 Vicryl on the right and 1 on the left was placed.  Care was taken to stay medial to the ureter.   Then the anterior repair was performed grasped with cuff with Allis clamps injected along the midline of the vaginal mucosa of the cystocele.  The Metzenbaums used to dissect the length of the cystocele in the midline and the pubocervical fascia was dissected off bilaterally.  A pursestring 0 Vicryl suture was then used to elevate and reduce the cystocele and a second layer of imbricating 0 Vicryl sutures.  Once the pubocervical fascia was reapproximated the bladder was noted to have really good support the excess vaginal mucosa was trimmed and closed with a running 3-0 running locking Vicryl suture.  The cuff was then closed at the apex with interrupted 0 Vicryl figure-of-eight sutures.  Then the suspension sutures were then tied using the 0 Vicryl suture and elevated the cuff to have good apical support.  At this point the cystoscopy was performed bladder filled with 300 cc sterile saline.  The bladder itself was intact and there was normal Pyridium stained urine flowing through each ureteral orifice and a nice stream with no sign of obstruction.  Then the Lambert catheter was replaced to do the posterior repair.  The perineum was grasped with Allis clamps injected with the local solution and a hyun-shaped wedge excision of the skin of the perineum was removed with a scalpel.  The midline rectocele was dissected in the middle with the Metzenbaum scissors with a push.  Midline technique along the length of the rectocele.  The rectovaginal fascia was dissected off bilaterally and a finger in the rectum to facilitate this with no injury to the rectum.  Once the rectocele was reduced interrupted 0 Vicryl figure-of-eight sutures were used to reapproximate this rectovaginal fascia.  Excess vaginal mucosa trimmed the perineum reconstructed with some extra 0 Vicryl suture simple.  The copious irrigation was performed closure with a 3-0 running locking Vicryl suture to the level the perineum.  Perineum was then closed in  the standard subcuticular closure.  Final rectal exam showed no defects no sutures either.  Bleeding was negative and some Premarin cream soaked vaginal packing was placed for overnight with a Lambert catheter and placed.  The patient was then taken a lithotomy position a final count was correct.  She is awoken from general anesthesia and taken to recovery in stable condition.    Radha Celis MD - 3/20/2024, 16:44 EDT

## 2024-03-20 NOTE — PLAN OF CARE
Goal Outcome Evaluation:                                 Transferred to unit. VSS on RA. Ambulated from stretcher to bed with assist x1. Urethral catheter in place. Fluids infusing. Family at bedside, attentive to patient.

## 2024-03-20 NOTE — ANESTHESIA POSTPROCEDURE EVALUATION
Patient: Amanda Lawson    Procedure Summary       Date: 03/20/24 Room / Location:  AUTUMN OR 20 / [facility]UMN OR    Anesthesia Start: 1432 Anesthesia Stop: 1710    Procedures:       TOTAL VAGINAL HYSTERECTOMY, ANTERIOR AND POSTERIOR COLPORRHAPHY VAGINAL REPAIR, UTEROSACRAL LIGAMENT VAGINAL VAULT SUSPENSION, CYSTO (Vagina)      CYSTOSCOPY (Bladder) Diagnosis:     Surgeons: Radha Celis MD Provider: Matthew Whitney MD    Anesthesia Type: general ASA Status: 2            Anesthesia Type: general    Vitals  Vitals Value Taken Time   /80 03/20/24 1706   Temp 97.1    Pulse 67 03/20/24 1709   Resp 14    SpO2 96 % 03/20/24 1709   Vitals shown include unfiled device data.        Post Anesthesia Care and Evaluation    Patient location during evaluation: PACU  Patient participation: complete - patient participated  Level of consciousness: awake and alert  Pain management: adequate    Airway patency: patent  Anesthetic complications: No anesthetic complications  PONV Status: none  Cardiovascular status: hemodynamically stable and acceptable  Respiratory status: nonlabored ventilation, acceptable and nasal cannula  Hydration status: acceptable

## 2024-03-21 VITALS
OXYGEN SATURATION: 97 % | SYSTOLIC BLOOD PRESSURE: 122 MMHG | HEART RATE: 76 BPM | RESPIRATION RATE: 18 BRPM | DIASTOLIC BLOOD PRESSURE: 62 MMHG | TEMPERATURE: 97.6 F

## 2024-03-21 PROBLEM — Z90.710 S/P HYSTERECTOMY: Status: RESOLVED | Noted: 2024-03-20 | Resolved: 2024-03-21

## 2024-03-21 LAB
ANION GAP SERPL CALCULATED.3IONS-SCNC: 9 MMOL/L (ref 5–15)
BUN SERPL-MCNC: 14 MG/DL (ref 8–23)
BUN/CREAT SERPL: 23.7 (ref 7–25)
CALCIUM SPEC-SCNC: 8.9 MG/DL (ref 8.6–10.5)
CHLORIDE SERPL-SCNC: 95 MMOL/L (ref 98–107)
CO2 SERPL-SCNC: 29 MMOL/L (ref 22–29)
CREAT SERPL-MCNC: 0.59 MG/DL (ref 0.57–1)
DEPRECATED RDW RBC AUTO: 40 FL (ref 37–54)
EGFRCR SERPLBLD CKD-EPI 2021: 90.1 ML/MIN/1.73
ERYTHROCYTE [DISTWIDTH] IN BLOOD BY AUTOMATED COUNT: 12.2 % (ref 12.3–15.4)
GLUCOSE SERPL-MCNC: 142 MG/DL (ref 65–99)
HCT VFR BLD AUTO: 38.5 % (ref 34–46.6)
HGB BLD-MCNC: 12.9 G/DL (ref 12–15.9)
MCH RBC QN AUTO: 30.1 PG (ref 26.6–33)
MCHC RBC AUTO-ENTMCNC: 33.5 G/DL (ref 31.5–35.7)
MCV RBC AUTO: 90 FL (ref 79–97)
PLATELET # BLD AUTO: 157 10*3/MM3 (ref 140–450)
PMV BLD AUTO: 10.5 FL (ref 6–12)
POTASSIUM SERPL-SCNC: 3.9 MMOL/L (ref 3.5–5.2)
RBC # BLD AUTO: 4.28 10*6/MM3 (ref 3.77–5.28)
SODIUM SERPL-SCNC: 133 MMOL/L (ref 136–145)
WBC NRBC COR # BLD AUTO: 10.36 10*3/MM3 (ref 3.4–10.8)

## 2024-03-21 PROCEDURE — 80048 BASIC METABOLIC PNL TOTAL CA: CPT | Performed by: OBSTETRICS & GYNECOLOGY

## 2024-03-21 PROCEDURE — A9270 NON-COVERED ITEM OR SERVICE: HCPCS | Performed by: OBSTETRICS & GYNECOLOGY

## 2024-03-21 PROCEDURE — 63710000001 FAMOTIDINE 20 MG TABLET: Performed by: OBSTETRICS & GYNECOLOGY

## 2024-03-21 PROCEDURE — 63710000001 METOPROLOL SUCCINATE XL 25 MG TABLET SUSTAINED-RELEASE 24 HOUR: Performed by: OBSTETRICS & GYNECOLOGY

## 2024-03-21 PROCEDURE — 25010000002 ONDANSETRON PER 1 MG: Performed by: OBSTETRICS & GYNECOLOGY

## 2024-03-21 PROCEDURE — 63710000001 METOPROLOL SUCCINATE XL 50 MG TABLET SUSTAINED-RELEASE 24 HOUR: Performed by: OBSTETRICS & GYNECOLOGY

## 2024-03-21 PROCEDURE — 63710000001 HYDROCHLOROTHIAZIDE 12.5 MG CAPSULE: Performed by: OBSTETRICS & GYNECOLOGY

## 2024-03-21 PROCEDURE — 85027 COMPLETE CBC AUTOMATED: CPT | Performed by: OBSTETRICS & GYNECOLOGY

## 2024-03-21 PROCEDURE — 25010000002 CEFAZOLIN PER 500 MG: Performed by: OBSTETRICS & GYNECOLOGY

## 2024-03-21 PROCEDURE — 63710000001 HYDROCODONE-ACETAMINOPHEN 5-325 MG TABLET: Performed by: OBSTETRICS & GYNECOLOGY

## 2024-03-21 RX ADMIN — ONDANSETRON 4 MG: 2 INJECTION INTRAMUSCULAR; INTRAVENOUS at 10:37

## 2024-03-21 RX ADMIN — HYDROCODONE BITARTRATE AND ACETAMINOPHEN 1 TABLET: 5; 325 TABLET ORAL at 05:35

## 2024-03-21 RX ADMIN — HYDROCHLOROTHIAZIDE 12.5 MG: 12.5 CAPSULE ORAL at 08:33

## 2024-03-21 RX ADMIN — SODIUM CHLORIDE 2000 MG: 900 INJECTION INTRAVENOUS at 05:35

## 2024-03-21 RX ADMIN — FAMOTIDINE 20 MG: 20 TABLET, FILM COATED ORAL at 08:33

## 2024-03-21 RX ADMIN — HYDROCODONE BITARTRATE AND ACETAMINOPHEN 1 TABLET: 5; 325 TABLET ORAL at 01:00

## 2024-03-21 RX ADMIN — METOPROLOL SUCCINATE 75 MG: 50 TABLET, EXTENDED RELEASE ORAL at 08:33

## 2024-03-21 NOTE — PROGRESS NOTES
Surgery Post-op Note  [place]    * No Diagnosis Codes entered *    * No Diagnosis Codes entered *    Procedure(s):  TOTAL VAGINAL HYSTERECTOMY, ANTERIOR AND POSTERIOR COLPORRHAPHY VAGINAL REPAIR, UTEROSACRAL LIGAMENT VAGINAL VAULT SUSPENSION, CYSTO  CYSTOSCOPY    Subjective     Castellano just out.  Did have some pain and pressure last night and felt constant need to urinate. Removing the castellano has helped this.  Min vaginal bleeding since the vaginal packing is out.  Has been up just  a little. Passing flatus. Tolerating some solid food already.     Objective   Physical Exam  Vitals:    03/21/24 0356   BP: 131/62   Pulse: 75   Resp: 18   Temp: 97 °F (36.1 °C)   SpO2: 95%     General: awake, alert, comfortable    Pulm: CTAB    CVS: no M/R/G, reg rate    Abd: soft, NT, ND, NABS    Ext: warm, well perfused      Labs:  Lab Results (last 24 hours)       Procedure Component Value Units Date/Time    CBC (No Diff) [[id number]]  (Abnormal) Collected: 03/21/24 0517    Specimen: Blood Updated: 03/21/24 0546     WBC 10.36 10*3/mm3      RBC 4.28 10*6/mm3      Hemoglobin 12.9 g/dL      Hematocrit 38.5 %      MCV 90.0 fL      MCH 30.1 pg      MCHC 33.5 g/dL      RDW 12.2 %      RDW-SD 40.0 fl      MPV 10.5 fL      Platelets 157 10*3/mm3     Basic Metabolic Panel [[id number]] Collected: 03/21/24 0517    Specimen: Blood Updated: 03/21/24 0539                Intake and Output:    Intake/Output Summary (Last 24 hours) at 3/21/2024 0637  Last data filed at 3/21/2024 0535  Gross per 24 hour   Intake 100 ml   Output 1800 ml   Net -1700 ml       Assessment     The patient is a 83 y.o. female 1 Day Post-Op after TVH VVS APR for prolapse    Plan     Plan for voiding trials.  Dc home today if passing voiding trials, pain controlled. Tolerating regular diet.   Instructed on sitz baths.  Pt has pain meds at home.   Pt has follow up already scheduled and has postop care instructions given at preop    Radha Celis MD  03/21/24  06:37  EDT

## 2024-03-21 NOTE — PLAN OF CARE
Goal Outcome Evaluation:  Plan of Care Reviewed With: patient        Progress: improving  Outcome Evaluation: VSS on RA. A&O x4. IV abx given. Adequate UOP. Small - mod vaginal bleeding noted on vaginal packing when packing removed. Adequate UOP. Lambert pulled 0600. Pain controlled with one time dose of Toradol and PRN norco. Pt slept on and off.

## 2024-03-21 NOTE — PLAN OF CARE
Goal Outcome Evaluation:     VSS on RA. Patient has voided over 200 mL. Ondansetron x1 for nausea. Patient provided with delmy-bottles and sitz bath, education provided. Discharge information packet sent home with patient. Patient will be picking up medications from home pharmacy. Discharge education included activity restrictions, medication changes, follow-up. Patient and daughter express understanding.

## 2024-03-21 NOTE — CASE MANAGEMENT/SOCIAL WORK
Continued Stay Note  [facility]     Patient Name: Amanda Lawson  MRN: [id number]  Today's Date: 3/21/2024    Admit Date: 3/20/2024        Discharge Plan       Row Name 03/21/24 1038       Plan    Final Discharge Disposition Code 01 - home or self-care                   Discharge Codes    No documentation.                 Expected Discharge Date and Time       Expected Discharge Date Expected Discharge Time    Mar 21, 2024               MATTY Baron

## 2024-03-21 NOTE — DISCHARGE SUMMARY
Date of Discharge:  3/21/2024    Discharge Diagnosis: s/p TVH APR VVS for prolapse    Presenting Problem/History of Present Illness  S/P hysterectomy [Z90.710]         Hospital Course  Patient is a 83 y.o. female presented with prolapsed and admitted to hospital for surgical repair. .      Procedures Performed  Procedure(s):  TOTAL VAGINAL HYSTERECTOMY, ANTERIOR AND POSTERIOR COLPORRHAPHY VAGINAL REPAIR, UTEROSACRAL LIGAMENT VAGINAL VAULT SUSPENSION, CYSTO  CYSTOSCOPY       Consults:   Consults       No orders found for last 30 day(s).            Pertinent Test Results:  Labs:    Lab Results (last 72 hours)       Procedure Component Value Units Date/Time    CBC (No Diff) [[id number]]  (Abnormal) Collected: 03/21/24 0517    Specimen: Blood Updated: 03/21/24 0546     WBC 10.36 10*3/mm3      RBC 4.28 10*6/mm3      Hemoglobin 12.9 g/dL      Hematocrit 38.5 %      MCV 90.0 fL      MCH 30.1 pg      MCHC 33.5 g/dL      RDW 12.2 %      RDW-SD 40.0 fl      MPV 10.5 fL      Platelets 157 10*3/mm3     Basic Metabolic Panel [[id number]] Collected: 03/21/24 0517    Specimen: Blood Updated: 03/21/24 0539            Condition on Discharge:  good    Vital Signs  Temp:  [96.3 °F (35.7 °C)-98 °F (36.7 °C)] 97 °F (36.1 °C)  Heart Rate:  [59-75] 75  Resp:  [10-18] 18  BP: (131-163)/(60-81) 131/62    Discharge Disposition  Home or Self Care    Discharge Medications     Discharge Medications        Changes to Medications        Instructions Start Date   famotidine 20 MG tablet  Commonly known as: PEPCID  What changed:   when to take this  reasons to take this   20 mg, Oral, 2 Times Daily      losartan 100 MG tablet  Commonly known as: COZAAR  What changed: when to take this   100 mg, Oral, Daily      metoprolol succinate XL 50 MG 24 hr tablet  Commonly known as: TOPROL-XL  What changed: when to take this   75 mg, Oral, 2 Times Daily      metoprolol succinate XL 50 MG 24 hr tablet  Commonly known as: TOPROL-XL  What changed: Another  medication with the same name was changed. Make sure you understand how and when to take each.   50 mg, Oral, Nightly             Continue These Medications        Instructions Start Date   CALCIUM-VITAMIN D PO   1 tablet, Oral, 2 Times Daily, 1 chewable in the morning and after dinner.      Co Q 10 100 MG capsule   1 capsule, Oral, Daily      hydroCHLOROthiazide 12.5 MG tablet   12.5 mg, Oral, Daily      methylcellulose oral powder   Oral, Daily      multivitamin with minerals tablet tablet   1 tablet, Oral, Daily      omega-3 acid ethyl esters 1 g capsule  Commonly known as: LOVAZA   2 g, Oral, Nightly      simvastatin 20 MG tablet  Commonly known as: ZOCOR   20 mg, Oral, Every Night at Bedtime      sodium chloride 5 % ophthalmic solution  Commonly known as: KARRIE 128   1 drop, Both Eyes, 2 Times Daily, Every morning and night      timolol 0.5 % ophthalmic gel-forming  Commonly known as: TIMOPTIC-XR   1 drop, Both Eyes, Every Morning      travoprost (BRANDYN free) 0.004 % solution ophthalmic solution  Commonly known as: TRAVATAN   1 drop, Both Eyes, Nightly, Instill 1 drop into each eye once daily             Stop These Medications      aspirin 81 MG tablet              Discharge Diet: regular    Activity at Discharge: pelvic rest, lifting restrictions    Follow-up Appointments:Pt has 2 postop appointments.  Future Appointments   Date Time Provider Department Center   9/18/2024  1:15 PM Marie Soto MD [facility]       Test Results Pending at Discharge  Pending Labs       Order Current Status    Tissue Pathology Exam Collected (03/20/24 9346)    Basic Metabolic Panel In process             Radha Celis MD  03/21/24  06:47 EDT

## 2024-03-22 LAB
CYTO UR: NORMAL
LAB AP CASE REPORT: NORMAL
LAB AP CLINICAL INFORMATION: NORMAL
PATH REPORT.FINAL DX SPEC: NORMAL
PATH REPORT.GROSS SPEC: NORMAL

## 2024-08-13 ENCOUNTER — PATIENT MESSAGE (OUTPATIENT)
Dept: INTERNAL MEDICINE | Facility: CLINIC | Age: 84
End: 2024-08-13
Payer: MEDICARE

## 2024-08-13 DIAGNOSIS — I10 BENIGN ESSENTIAL HYPERTENSION: ICD-10-CM

## 2024-08-13 DIAGNOSIS — K21.9 GERD WITHOUT ESOPHAGITIS: ICD-10-CM

## 2024-08-13 DIAGNOSIS — E78.2 MIXED HYPERLIPIDEMIA: ICD-10-CM

## 2024-08-13 RX ORDER — SIMVASTATIN 20 MG
20 TABLET ORAL
Qty: 90 TABLET | Refills: 1 | Status: SHIPPED | OUTPATIENT
Start: 2024-08-13

## 2024-08-13 RX ORDER — HYDROCHLOROTHIAZIDE 12.5 MG/1
12.5 TABLET ORAL DAILY
Qty: 90 TABLET | Refills: 1 | Status: SHIPPED | OUTPATIENT
Start: 2024-08-13

## 2024-08-13 RX ORDER — METOPROLOL SUCCINATE 50 MG/1
50 TABLET, EXTENDED RELEASE ORAL NIGHTLY
Qty: 90 TABLET | Refills: 1 | Status: SHIPPED | OUTPATIENT
Start: 2024-08-13

## 2024-08-13 RX ORDER — FAMOTIDINE 20 MG/1
20 TABLET, FILM COATED ORAL 2 TIMES DAILY
Qty: 180 TABLET | Refills: 1 | Status: SHIPPED | OUTPATIENT
Start: 2024-08-13

## 2024-08-13 RX ORDER — LOSARTAN POTASSIUM 100 MG/1
100 TABLET ORAL DAILY
Qty: 90 TABLET | Refills: 1 | Status: SHIPPED | OUTPATIENT
Start: 2024-08-13

## 2024-08-13 NOTE — TELEPHONE ENCOUNTER
Rx Refill Note  Requested Prescriptions     Pending Prescriptions Disp Refills    simvastatin (ZOCOR) 20 MG tablet 90 tablet 1     Sig: Take 1 tablet by mouth every night at bedtime.    metoprolol succinate XL (TOPROL-XL) 50 MG 24 hr tablet 90 tablet 1     Sig: Take 1 tablet by mouth Every Night.    losartan (COZAAR) 100 MG tablet 90 tablet 1     Sig: Take 1 tablet by mouth Daily.    famotidine (PEPCID) 20 MG tablet 180 tablet 1     Sig: Take 1 tablet by mouth 2 (Two) Times a Day.    hydroCHLOROthiazide 12.5 MG tablet 90 tablet 1     Sig: Take 1 tablet by mouth Daily.      Last office visit with prescribing clinician: 3/14/2024   Last telemedicine visit with prescribing clinician: Visit date not found   Next office visit with prescribing clinician: 9/18/2024                         Would you like a call back once the refill request has been completed: [] Yes [] No    If the office needs to give you a call back, can they leave a voicemail: [] Yes [] No    Sharri Bolaños RN  08/13/24, 11:08 EDT

## 2024-08-13 NOTE — TELEPHONE ENCOUNTER
From: Amanda Lawson  To: Marie Soto  Sent: 8/13/2024 10:59 AM EDT  Subject: PRESCRIPTIONS     please refill these presriptiosn for 90 days :  20 mg simvastatin  50 mg metroprolol succ  100 mg losartan  20 mg famotidine  12.5 mg hydrochlorothiazide   THANK YOU Amanda Lawson [address] [facility] in [place], [place]

## 2024-09-03 RX ORDER — METOPROLOL SUCCINATE 50 MG/1
50 TABLET, EXTENDED RELEASE ORAL 2 TIMES DAILY
Qty: 180 TABLET | Refills: 1 | OUTPATIENT
Start: 2024-09-03

## 2024-09-03 RX ORDER — OMEGA-3-ACID ETHYL ESTERS 1 G/1
2 CAPSULE, LIQUID FILLED ORAL NIGHTLY
Qty: 180 CAPSULE | Refills: 1 | Status: SHIPPED | OUTPATIENT
Start: 2024-09-03

## 2024-09-03 NOTE — TELEPHONE ENCOUNTER
Rx Refill Note  Requested Prescriptions     Pending Prescriptions Disp Refills    omega-3 acid ethyl esters (LOVAZA) 1 g capsule [Pharmacy Med Name: OMEGA-3 ETHYL ESTERS 1 GM CAP] 180 capsule 1     Sig: TAKE TWO CAPSULES BY MOUTH ONCE NIGHTLY    metoprolol succinate XL (TOPROL-XL) 50 MG 24 hr tablet [Pharmacy Med Name: METOPROLOL SUCC ER 50 MG TAB] 180 tablet 1     Sig: TAKE 1 TABLET BY MOUTH TWICE A DAY      Last office visit with prescribing clinician: 3/14/2024   Last telemedicine visit with prescribing clinician: Visit date not found   Next office visit with prescribing clinician: 9/18/2024                         Would you like a call back once the refill request has been completed: [] Yes [] No    If the office needs to give you a call back, can they leave a voicemail: [] Yes [] No    Sonali Donnelly LPN  09/03/24, 09:44 EDT

## 2024-09-11 DIAGNOSIS — E78.2 MIXED HYPERLIPIDEMIA: ICD-10-CM

## 2024-09-11 DIAGNOSIS — E55.9 VITAMIN D DEFICIENCY: Primary | ICD-10-CM

## 2024-09-11 DIAGNOSIS — R53.83 OTHER FATIGUE: Chronic | ICD-10-CM

## 2024-09-11 DIAGNOSIS — N39.3 STRESS INCONTINENCE OF URINE: ICD-10-CM

## 2024-09-11 DIAGNOSIS — I10 BENIGN ESSENTIAL HYPERTENSION: ICD-10-CM

## 2024-09-16 ENCOUNTER — LAB (OUTPATIENT)
Dept: LAB | Facility: HOSPITAL | Age: 84
End: 2024-09-16
Payer: MEDICARE

## 2024-09-16 DIAGNOSIS — R53.83 OTHER FATIGUE: Chronic | ICD-10-CM

## 2024-09-16 DIAGNOSIS — E78.2 MIXED HYPERLIPIDEMIA: ICD-10-CM

## 2024-09-16 DIAGNOSIS — I10 BENIGN ESSENTIAL HYPERTENSION: ICD-10-CM

## 2024-09-16 DIAGNOSIS — N39.3 STRESS INCONTINENCE OF URINE: ICD-10-CM

## 2024-09-16 DIAGNOSIS — E55.9 VITAMIN D DEFICIENCY: ICD-10-CM

## 2024-09-16 LAB
25(OH)D3 SERPL-MCNC: 47.6 NG/ML (ref 30–100)
ALBUMIN SERPL-MCNC: 4.9 G/DL (ref 3.5–5.2)
ALBUMIN UR-MCNC: 5.6 MG/DL
ALBUMIN/GLOB SERPL: 2.2 G/DL
ALP SERPL-CCNC: 81 U/L (ref 39–117)
ALT SERPL W P-5'-P-CCNC: 20 U/L (ref 1–33)
ANION GAP SERPL CALCULATED.3IONS-SCNC: 13 MMOL/L (ref 5–15)
AST SERPL-CCNC: 21 U/L (ref 1–32)
BACTERIA UR QL AUTO: ABNORMAL /HPF
BASOPHILS # BLD AUTO: 0.05 10*3/MM3 (ref 0–0.2)
BASOPHILS NFR BLD AUTO: 0.8 % (ref 0–1.5)
BILIRUB SERPL-MCNC: 0.5 MG/DL (ref 0–1.2)
BILIRUB UR QL STRIP: NEGATIVE
BUN SERPL-MCNC: 16 MG/DL (ref 8–23)
BUN/CREAT SERPL: 19 (ref 7–25)
CALCIUM SPEC-SCNC: 10.3 MG/DL (ref 8.6–10.5)
CHLORIDE SERPL-SCNC: 95 MMOL/L (ref 98–107)
CHOLEST SERPL-MCNC: 171 MG/DL (ref 0–200)
CLARITY UR: ABNORMAL
CO2 SERPL-SCNC: 29 MMOL/L (ref 22–29)
COLOR UR: YELLOW
CREAT SERPL-MCNC: 0.84 MG/DL (ref 0.57–1)
CREAT UR-MCNC: 87.9 MG/DL
DEPRECATED RDW RBC AUTO: 39.5 FL (ref 37–54)
EGFRCR SERPLBLD CKD-EPI 2021: 69 ML/MIN/1.73
EOSINOPHIL # BLD AUTO: 0.08 10*3/MM3 (ref 0–0.4)
EOSINOPHIL NFR BLD AUTO: 1.2 % (ref 0.3–6.2)
ERYTHROCYTE [DISTWIDTH] IN BLOOD BY AUTOMATED COUNT: 12.3 % (ref 12.3–15.4)
GLOBULIN UR ELPH-MCNC: 2.2 GM/DL
GLUCOSE SERPL-MCNC: 90 MG/DL (ref 65–99)
GLUCOSE UR STRIP-MCNC: NEGATIVE MG/DL
HCT VFR BLD AUTO: 44.2 % (ref 34–46.6)
HDLC SERPL-MCNC: 57 MG/DL (ref 40–60)
HGB BLD-MCNC: 14.7 G/DL (ref 12–15.9)
HGB UR QL STRIP.AUTO: ABNORMAL
HYALINE CASTS UR QL AUTO: ABNORMAL /LPF
IMM GRANULOCYTES # BLD AUTO: 0.03 10*3/MM3 (ref 0–0.05)
IMM GRANULOCYTES NFR BLD AUTO: 0.5 % (ref 0–0.5)
KETONES UR QL STRIP: NEGATIVE
LDLC SERPL CALC-MCNC: 88 MG/DL (ref 0–100)
LDLC/HDLC SERPL: 1.48 {RATIO}
LEUKOCYTE ESTERASE UR QL STRIP.AUTO: ABNORMAL
LYMPHOCYTES # BLD AUTO: 2.04 10*3/MM3 (ref 0.7–3.1)
LYMPHOCYTES NFR BLD AUTO: 31.2 % (ref 19.6–45.3)
MCH RBC QN AUTO: 29.8 PG (ref 26.6–33)
MCHC RBC AUTO-ENTMCNC: 33.3 G/DL (ref 31.5–35.7)
MCV RBC AUTO: 89.7 FL (ref 79–97)
MICROALBUMIN/CREAT UR: 63.7 MG/G (ref 0–29)
MONOCYTES # BLD AUTO: 0.67 10*3/MM3 (ref 0.1–0.9)
MONOCYTES NFR BLD AUTO: 10.3 % (ref 5–12)
NEUTROPHILS NFR BLD AUTO: 3.66 10*3/MM3 (ref 1.7–7)
NEUTROPHILS NFR BLD AUTO: 56 % (ref 42.7–76)
NITRITE UR QL STRIP: NEGATIVE
NRBC BLD AUTO-RTO: 0 /100 WBC (ref 0–0.2)
PH UR STRIP.AUTO: 8 [PH] (ref 5–8)
PLATELET # BLD AUTO: 187 10*3/MM3 (ref 140–450)
PMV BLD AUTO: 10.3 FL (ref 6–12)
POTASSIUM SERPL-SCNC: 4 MMOL/L (ref 3.5–5.2)
PROT SERPL-MCNC: 7.1 G/DL (ref 6–8.5)
PROT UR QL STRIP: ABNORMAL
RBC # BLD AUTO: 4.93 10*6/MM3 (ref 3.77–5.28)
RBC # UR STRIP: ABNORMAL /HPF
REF LAB TEST METHOD: ABNORMAL
SODIUM SERPL-SCNC: 137 MMOL/L (ref 136–145)
SP GR UR STRIP: 1.02 (ref 1–1.03)
SQUAMOUS #/AREA URNS HPF: ABNORMAL /HPF
TRIGL SERPL-MCNC: 148 MG/DL (ref 0–150)
TSH SERPL DL<=0.05 MIU/L-ACNC: 4.09 UIU/ML (ref 0.27–4.2)
UROBILINOGEN UR QL STRIP: ABNORMAL
VLDLC SERPL-MCNC: 26 MG/DL (ref 5–40)
WBC # UR STRIP: ABNORMAL /HPF
WBC NRBC COR # BLD AUTO: 6.53 10*3/MM3 (ref 3.4–10.8)

## 2024-09-16 PROCEDURE — 82306 VITAMIN D 25 HYDROXY: CPT

## 2024-09-16 PROCEDURE — 82043 UR ALBUMIN QUANTITATIVE: CPT

## 2024-09-16 PROCEDURE — 80061 LIPID PANEL: CPT

## 2024-09-16 PROCEDURE — 85025 COMPLETE CBC W/AUTO DIFF WBC: CPT

## 2024-09-16 PROCEDURE — 84443 ASSAY THYROID STIM HORMONE: CPT

## 2024-09-16 PROCEDURE — 80053 COMPREHEN METABOLIC PANEL: CPT

## 2024-09-16 PROCEDURE — 81001 URINALYSIS AUTO W/SCOPE: CPT

## 2024-09-16 PROCEDURE — 82570 ASSAY OF URINE CREATININE: CPT

## 2024-09-18 ENCOUNTER — OFFICE VISIT (OUTPATIENT)
Dept: INTERNAL MEDICINE | Facility: CLINIC | Age: 84
End: 2024-09-18
Payer: MEDICARE

## 2024-09-18 VITALS
WEIGHT: 142 LBS | SYSTOLIC BLOOD PRESSURE: 124 MMHG | BODY MASS INDEX: 26.13 KG/M2 | HEIGHT: 62 IN | HEART RATE: 60 BPM | TEMPERATURE: 98.6 F | DIASTOLIC BLOOD PRESSURE: 70 MMHG | OXYGEN SATURATION: 95 %

## 2024-09-18 DIAGNOSIS — Z91.81 RISK FOR FALLS: ICD-10-CM

## 2024-09-18 DIAGNOSIS — I10 BENIGN ESSENTIAL HYPERTENSION: ICD-10-CM

## 2024-09-18 DIAGNOSIS — M85.89 OSTEOPENIA OF MULTIPLE SITES: ICD-10-CM

## 2024-09-18 DIAGNOSIS — Z00.00 ANNUAL PHYSICAL EXAM: ICD-10-CM

## 2024-09-18 DIAGNOSIS — Z00.00 MEDICARE ANNUAL WELLNESS VISIT, SUBSEQUENT: Primary | ICD-10-CM

## 2024-09-18 DIAGNOSIS — E78.2 MIXED HYPERLIPIDEMIA: ICD-10-CM

## 2024-09-18 PROCEDURE — 3078F DIAST BP <80 MM HG: CPT | Performed by: INTERNAL MEDICINE

## 2024-09-18 PROCEDURE — 99397 PER PM REEVAL EST PAT 65+ YR: CPT | Performed by: INTERNAL MEDICINE

## 2024-09-18 PROCEDURE — 1126F AMNT PAIN NOTED NONE PRSNT: CPT | Performed by: INTERNAL MEDICINE

## 2024-09-18 PROCEDURE — G0439 PPPS, SUBSEQ VISIT: HCPCS | Performed by: INTERNAL MEDICINE

## 2024-09-18 PROCEDURE — 3074F SYST BP LT 130 MM HG: CPT | Performed by: INTERNAL MEDICINE

## 2024-09-18 PROCEDURE — 1160F RVW MEDS BY RX/DR IN RCRD: CPT | Performed by: INTERNAL MEDICINE

## 2024-09-18 PROCEDURE — 96160 PT-FOCUSED HLTH RISK ASSMT: CPT | Performed by: INTERNAL MEDICINE

## 2024-09-18 PROCEDURE — 1159F MED LIST DOCD IN RCRD: CPT | Performed by: INTERNAL MEDICINE

## 2024-09-18 RX ORDER — METOPROLOL SUCCINATE 50 MG/1
50 TABLET, EXTENDED RELEASE ORAL 2 TIMES DAILY
Qty: 180 TABLET | Refills: 1 | Status: SHIPPED | OUTPATIENT
Start: 2024-09-18

## 2024-09-18 RX ORDER — SIMVASTATIN 20 MG
20 TABLET ORAL
Qty: 90 TABLET | Refills: 1 | Status: SHIPPED | OUTPATIENT
Start: 2024-09-18

## 2024-09-18 RX ORDER — HYDROCHLOROTHIAZIDE 12.5 MG/1
12.5 TABLET ORAL DAILY
Qty: 90 TABLET | Refills: 1 | Status: SHIPPED | OUTPATIENT
Start: 2024-09-18

## 2024-10-21 ENCOUNTER — TRANSCRIBE ORDERS (OUTPATIENT)
Dept: ADMINISTRATIVE | Facility: HOSPITAL | Age: 84
End: 2024-10-21
Payer: MEDICARE

## 2024-10-21 DIAGNOSIS — Z12.31 SCREENING MAMMOGRAM FOR BREAST CANCER: Primary | ICD-10-CM

## 2024-12-09 ENCOUNTER — HOSPITAL ENCOUNTER (OUTPATIENT)
Dept: MAMMOGRAPHY | Facility: HOSPITAL | Age: 84
Discharge: HOME | End: 2024-12-09
Admitting: INTERNAL MEDICINE
Payer: MEDICARE

## 2024-12-09 DIAGNOSIS — Z12.31 SCREENING MAMMOGRAM FOR BREAST CANCER: ICD-10-CM

## 2024-12-09 PROCEDURE — 77063 BREAST TOMOSYNTHESIS BI: CPT

## 2024-12-09 PROCEDURE — 77067 SCR MAMMO BI INCL CAD: CPT

## 2025-03-03 RX ORDER — LOSARTAN POTASSIUM 100 MG/1
100 TABLET ORAL DAILY
Qty: 90 TABLET | Refills: 1 | Status: SHIPPED | OUTPATIENT
Start: 2025-03-03

## 2025-03-03 NOTE — TELEPHONE ENCOUNTER
Rx Refill Note  Requested Prescriptions     Pending Prescriptions Disp Refills    losartan (COZAAR) 100 MG tablet [Pharmacy Med Name: LOSARTAN POTASSIUM 100 MG TAB] 90 tablet 1     Sig: TAKE 1 TABLET BY MOUTH DAILY      Last office visit with prescribing clinician: 9/18/2024   Last telemedicine visit with prescribing clinician: Visit date not found   Next office visit with prescribing clinician: 3/18/2025                         Would you like a call back once the refill request has been completed: [] Yes [] No    If the office needs to give you a call back, can they leave a voicemail: [] Yes [] No    Beatrice Carter MA  03/03/25, 08:54 EST

## 2025-03-11 ENCOUNTER — TELEPHONE (OUTPATIENT)
Dept: INTERNAL MEDICINE | Facility: CLINIC | Age: 85
End: 2025-03-11

## 2025-03-11 DIAGNOSIS — E55.9 VITAMIN D DEFICIENCY: ICD-10-CM

## 2025-03-11 DIAGNOSIS — N39.3 STRESS INCONTINENCE OF URINE: ICD-10-CM

## 2025-03-11 DIAGNOSIS — R53.83 OTHER FATIGUE: ICD-10-CM

## 2025-03-11 DIAGNOSIS — I10 BENIGN ESSENTIAL HYPERTENSION: ICD-10-CM

## 2025-03-11 DIAGNOSIS — E78.2 MIXED HYPERLIPIDEMIA: Primary | ICD-10-CM

## 2025-03-11 NOTE — TELEPHONE ENCOUNTER
Caller: Amanda Lawson    Relationship: Self    Best call back number:[phone]     What orders are you requesting (i.e. lab or imaging): LABS FOR APPOINTMENT    In what timeframe would the patient need to come in: 03/14/25    Where will you receive your lab/imaging services: [facility]

## 2025-03-14 ENCOUNTER — LAB (OUTPATIENT)
Dept: LAB | Facility: HOSPITAL | Age: 85
End: 2025-03-14
Payer: MEDICARE

## 2025-03-14 DIAGNOSIS — I10 BENIGN ESSENTIAL HYPERTENSION: ICD-10-CM

## 2025-03-14 DIAGNOSIS — E55.9 VITAMIN D DEFICIENCY: ICD-10-CM

## 2025-03-14 DIAGNOSIS — N39.3 STRESS INCONTINENCE OF URINE: ICD-10-CM

## 2025-03-14 DIAGNOSIS — E78.2 MIXED HYPERLIPIDEMIA: ICD-10-CM

## 2025-03-14 DIAGNOSIS — R53.83 OTHER FATIGUE: ICD-10-CM

## 2025-03-14 LAB
25(OH)D3 SERPL-MCNC: 77.5 NG/ML (ref 30–100)
ALBUMIN SERPL-MCNC: 4.6 G/DL (ref 3.5–5.2)
ALBUMIN UR-MCNC: 2.5 MG/DL
ALBUMIN/GLOB SERPL: 1.9 G/DL
ALP SERPL-CCNC: 82 U/L (ref 39–117)
ALT SERPL W P-5'-P-CCNC: 20 U/L (ref 1–33)
ANION GAP SERPL CALCULATED.3IONS-SCNC: 10.4 MMOL/L (ref 5–15)
AST SERPL-CCNC: 26 U/L (ref 1–32)
BACTERIA UR QL AUTO: ABNORMAL /HPF
BASOPHILS # BLD AUTO: 0.04 10*3/MM3 (ref 0–0.2)
BASOPHILS NFR BLD AUTO: 0.7 % (ref 0–1.5)
BILIRUB SERPL-MCNC: 0.4 MG/DL (ref 0–1.2)
BILIRUB UR QL STRIP: NEGATIVE
BUN SERPL-MCNC: 14 MG/DL (ref 8–23)
BUN/CREAT SERPL: 16.9 (ref 7–25)
CALCIUM SPEC-SCNC: 10.1 MG/DL (ref 8.6–10.5)
CHLORIDE SERPL-SCNC: 96 MMOL/L (ref 98–107)
CHOLEST SERPL-MCNC: 163 MG/DL (ref 0–200)
CLARITY UR: CLEAR
CO2 SERPL-SCNC: 29.6 MMOL/L (ref 22–29)
COLOR UR: YELLOW
CREAT SERPL-MCNC: 0.83 MG/DL (ref 0.57–1)
CREAT UR-MCNC: 57.6 MG/DL
DEPRECATED RDW RBC AUTO: 42.4 FL (ref 37–54)
EGFRCR SERPLBLD CKD-EPI 2021: 70 ML/MIN/1.73
EOSINOPHIL # BLD AUTO: 0.07 10*3/MM3 (ref 0–0.4)
EOSINOPHIL NFR BLD AUTO: 1.3 % (ref 0.3–6.2)
ERYTHROCYTE [DISTWIDTH] IN BLOOD BY AUTOMATED COUNT: 12.3 % (ref 12.3–15.4)
GLOBULIN UR ELPH-MCNC: 2.4 GM/DL
GLUCOSE SERPL-MCNC: 91 MG/DL (ref 65–99)
GLUCOSE UR STRIP-MCNC: NEGATIVE MG/DL
HCT VFR BLD AUTO: 44.3 % (ref 34–46.6)
HDLC SERPL-MCNC: 55 MG/DL (ref 40–60)
HGB BLD-MCNC: 14.5 G/DL (ref 12–15.9)
HGB UR QL STRIP.AUTO: NEGATIVE
HOLD SPECIMEN: NORMAL
HYALINE CASTS UR QL AUTO: ABNORMAL /LPF
IMM GRANULOCYTES # BLD AUTO: 0.02 10*3/MM3 (ref 0–0.05)
IMM GRANULOCYTES NFR BLD AUTO: 0.4 % (ref 0–0.5)
KETONES UR QL STRIP: NEGATIVE
LDLC SERPL CALC-MCNC: 84 MG/DL (ref 0–100)
LDLC/HDLC SERPL: 1.47 {RATIO}
LEUKOCYTE ESTERASE UR QL STRIP.AUTO: ABNORMAL
LYMPHOCYTES # BLD AUTO: 1.64 10*3/MM3 (ref 0.7–3.1)
LYMPHOCYTES NFR BLD AUTO: 29.4 % (ref 19.6–45.3)
MCH RBC QN AUTO: 30.4 PG (ref 26.6–33)
MCHC RBC AUTO-ENTMCNC: 32.7 G/DL (ref 31.5–35.7)
MCV RBC AUTO: 92.9 FL (ref 79–97)
MICROALBUMIN/CREAT UR: 43.4 MG/G (ref 0–29)
MONOCYTES # BLD AUTO: 0.61 10*3/MM3 (ref 0.1–0.9)
MONOCYTES NFR BLD AUTO: 10.9 % (ref 5–12)
NEUTROPHILS NFR BLD AUTO: 3.2 10*3/MM3 (ref 1.7–7)
NEUTROPHILS NFR BLD AUTO: 57.3 % (ref 42.7–76)
NITRITE UR QL STRIP: NEGATIVE
NRBC BLD AUTO-RTO: 0 /100 WBC (ref 0–0.2)
PH UR STRIP.AUTO: 7.5 [PH] (ref 5–8)
PLATELET # BLD AUTO: 189 10*3/MM3 (ref 140–450)
PMV BLD AUTO: 10.6 FL (ref 6–12)
POTASSIUM SERPL-SCNC: 4.1 MMOL/L (ref 3.5–5.2)
PROT SERPL-MCNC: 7 G/DL (ref 6–8.5)
PROT UR QL STRIP: NEGATIVE
RBC # BLD AUTO: 4.77 10*6/MM3 (ref 3.77–5.28)
RBC # UR STRIP: ABNORMAL /HPF
REF LAB TEST METHOD: ABNORMAL
SODIUM SERPL-SCNC: 136 MMOL/L (ref 136–145)
SP GR UR STRIP: 1.01 (ref 1–1.03)
SQUAMOUS #/AREA URNS HPF: ABNORMAL /HPF
TRIGL SERPL-MCNC: 136 MG/DL (ref 0–150)
TSH SERPL DL<=0.05 MIU/L-ACNC: 4.01 UIU/ML (ref 0.27–4.2)
UROBILINOGEN UR QL STRIP: ABNORMAL
VLDLC SERPL-MCNC: 24 MG/DL (ref 5–40)
WBC # UR STRIP: ABNORMAL /HPF
WBC NRBC COR # BLD AUTO: 5.58 10*3/MM3 (ref 3.4–10.8)

## 2025-03-14 PROCEDURE — 82306 VITAMIN D 25 HYDROXY: CPT

## 2025-03-14 PROCEDURE — 36415 COLL VENOUS BLD VENIPUNCTURE: CPT

## 2025-03-14 PROCEDURE — 85025 COMPLETE CBC W/AUTO DIFF WBC: CPT

## 2025-03-14 PROCEDURE — 87086 URINE CULTURE/COLONY COUNT: CPT | Performed by: STUDENT IN AN ORGANIZED HEALTH CARE EDUCATION/TRAINING PROGRAM

## 2025-03-14 PROCEDURE — 84443 ASSAY THYROID STIM HORMONE: CPT

## 2025-03-14 PROCEDURE — 81001 URINALYSIS AUTO W/SCOPE: CPT | Performed by: STUDENT IN AN ORGANIZED HEALTH CARE EDUCATION/TRAINING PROGRAM

## 2025-03-14 PROCEDURE — 82570 ASSAY OF URINE CREATININE: CPT

## 2025-03-14 PROCEDURE — 80053 COMPREHEN METABOLIC PANEL: CPT

## 2025-03-14 PROCEDURE — 82043 UR ALBUMIN QUANTITATIVE: CPT

## 2025-03-14 PROCEDURE — 80061 LIPID PANEL: CPT

## 2025-03-16 LAB — BACTERIA SPEC AEROBE CULT: NORMAL

## 2025-03-17 RX ORDER — METOPROLOL SUCCINATE 50 MG/1
50 TABLET, EXTENDED RELEASE ORAL 2 TIMES DAILY
Qty: 180 TABLET | Refills: 1 | Status: SHIPPED | OUTPATIENT
Start: 2025-03-17 | End: 2025-03-18 | Stop reason: SDUPTHER

## 2025-03-18 ENCOUNTER — OFFICE VISIT (OUTPATIENT)
Dept: INTERNAL MEDICINE | Facility: CLINIC | Age: 85
End: 2025-03-18
Payer: MEDICARE

## 2025-03-18 VITALS
TEMPERATURE: 97.8 F | DIASTOLIC BLOOD PRESSURE: 88 MMHG | SYSTOLIC BLOOD PRESSURE: 140 MMHG | HEIGHT: 62 IN | OXYGEN SATURATION: 95 % | HEART RATE: 72 BPM | BODY MASS INDEX: 26.54 KG/M2 | WEIGHT: 144.2 LBS

## 2025-03-18 DIAGNOSIS — I10 BENIGN ESSENTIAL HYPERTENSION: Primary | ICD-10-CM

## 2025-03-18 DIAGNOSIS — M85.89 OSTEOPENIA OF MULTIPLE SITES: ICD-10-CM

## 2025-03-18 DIAGNOSIS — K21.9 GERD WITHOUT ESOPHAGITIS: ICD-10-CM

## 2025-03-18 DIAGNOSIS — E78.2 MIXED HYPERLIPIDEMIA: ICD-10-CM

## 2025-03-18 PROBLEM — R82.81 PYURIA: Status: RESOLVED | Noted: 2023-02-15 | Resolved: 2025-03-18

## 2025-03-18 RX ORDER — METOPROLOL SUCCINATE 50 MG/1
50 TABLET, EXTENDED RELEASE ORAL 2 TIMES DAILY
Qty: 180 TABLET | Refills: 3 | Status: SHIPPED | OUTPATIENT
Start: 2025-03-18

## 2025-03-18 RX ORDER — LOSARTAN POTASSIUM 100 MG/1
100 TABLET ORAL DAILY
Qty: 90 TABLET | Refills: 3 | Status: SHIPPED | OUTPATIENT
Start: 2025-03-18

## 2025-03-18 RX ORDER — TIMOLOL MALEATE 5 MG/ML
1 SOLUTION/ DROPS OPHTHALMIC DAILY
COMMUNITY

## 2025-03-18 RX ORDER — HYDROCHLOROTHIAZIDE 12.5 MG/1
12.5 TABLET ORAL DAILY
Qty: 90 TABLET | Refills: 3 | Status: SHIPPED | OUTPATIENT
Start: 2025-03-18

## 2025-03-18 RX ORDER — SIMVASTATIN 20 MG
20 TABLET ORAL
Qty: 90 TABLET | Refills: 3 | Status: SHIPPED | OUTPATIENT
Start: 2025-03-18

## 2025-03-18 RX ORDER — FAMOTIDINE 20 MG/1
20 TABLET, FILM COATED ORAL 2 TIMES DAILY
Qty: 180 TABLET | Refills: 3 | Status: SHIPPED | OUTPATIENT
Start: 2025-03-18

## 2025-03-18 RX ORDER — OMEGA-3-ACID ETHYL ESTERS 1 G/1
1 CAPSULE, LIQUID FILLED ORAL NIGHTLY
Qty: 90 CAPSULE | Refills: 3 | Status: SHIPPED | OUTPATIENT
Start: 2025-03-18

## 2025-03-18 NOTE — PATIENT INSTRUCTIONS
Patient Instructions  Problem List Items Addressed This Visit          Cardiac and Vasculature    Benign essential hypertension - Primary    Overview   Taking losartan 100mg and hydrochlorothiazide 12.5 mg daily and metoprolol XL 50mg twice a day.          Relevant Medications    metoprolol succinate XL (TOPROL-XL) 50 MG 24 hr tablet    losartan (COZAAR) 100 MG tablet    hydroCHLOROthiazide 12.5 MG tablet    Mixed hyperlipidemia    Overview   Taking simvastatin and lovaza.         Relevant Medications    simvastatin (ZOCOR) 20 MG tablet    omega-3 acid ethyl esters (LOVAZA) 1 g capsule       Gastrointestinal Abdominal     GERD without esophagitis    Overview   Taking famotidine twice a day.           Relevant Medications    famotidine (PEPCID) 20 MG tablet       Musculoskeletal and Injuries    Osteopenia of multiple sites    Overview   DEXA 11/1/2021 showed a decrease in T-scores in the spine, but it is still in the osteopenia range.  T score in the femoral neck is -1.3 which is also in the mild osteopenia range.                Exercising to Stay Healthy  To become healthy and stay healthy, it is recommended that you do moderate-intensity and vigorous-intensity exercise. You can tell that you are exercising at a moderate intensity if your heart starts beating faster and you start breathing faster but can still hold a conversation. You can tell that you are exercising at a vigorous intensity if you are breathing much harder and faster and cannot hold a conversation while exercising.  How can exercise benefit me?  Exercising regularly is important. It has many health benefits, such as:  Improving overall fitness, flexibility, and endurance.  Increasing bone density.  Helping with weight control.  Decreasing body fat.  Increasing muscle strength and endurance.  Reducing stress and tension, anxiety, depression, or anger.  Improving overall health.  What guidelines should I follow while exercising?  Before you start a  new exercise program, talk with your health care provider.  Do not exercise so much that you hurt yourself, feel dizzy, or get very short of breath.  Wear comfortable clothes and wear shoes with good support.  Drink plenty of water while you exercise to prevent dehydration or heat stroke.  Work out until your breathing and your heartbeat get faster (moderate intensity).  How often should I exercise?  Choose an activity that you enjoy, and set realistic goals. Your health care provider can help you make an activity plan that is individually designed and works best for you.  Exercise regularly as told by your health care provider. This may include:  Doing strength training two times a week, such as:  Lifting weights.  Using resistance bands.  Push-ups.  Sit-ups.  Yoga.  Doing a certain intensity of exercise for a given amount of time. Choose from these options:  A total of 150 minutes of moderate-intensity exercise every week.  A total of 75 minutes of vigorous-intensity exercise every week.  A mix of moderate-intensity and vigorous-intensity exercise every week.  Children, pregnant women, people who have not exercised regularly, people who are overweight, and older adults may need to talk with a health care provider about what activities are safe to perform. If you have a medical condition, be sure to talk with your health care provider before you start a new exercise program.  What are some exercise ideas?  Moderate-intensity exercise ideas include:  Walking 1 mile (1.6 km) in about 15 minutes.  Biking.  Hiking.  Golfing.  Dancing.  Water aerobics.  Vigorous-intensity exercise ideas include:  Walking 4.5 miles (7.2 km) or more in about 1 hour.  Jogging or running 5 miles (8 km) in about 1 hour.  Biking 10 miles (16.1 km) or more in about 1 hour.  Lap swimming.  Roller-skating or in-line skating.  Cross-country skiing.  Vigorous competitive sports, such as football, basketball, and soccer.  Jumping rope.  Aerobic  dancing.  What are some everyday activities that can help me get exercise?  Yard work, such as:  Pushing a .  Raking and bagging leaves.  Washing your car.  Pushing a stroller.  Shoveling snow.  Gardening.  Washing windows or floors.  How can I be more active in my day-to-day activities?  Use stairs instead of an elevator.  Take a walk during your lunch break.  If you drive, park your car farther away from your work or school.  If you take public transportation, get off one stop early and walk the rest of the way.  Stand up or walk around during all of your indoor phone calls.  Get up, stretch, and walk around every 30 minutes throughout the day.  Enjoy exercise with a friend. Support to continue exercising will help you keep a regular routine of activity.  Where to find more information  You can find more information about exercising to stay healthy from:  [place] Department of Health and Human Services: www.hhs.gov  Centers for Disease Control and Prevention (CDC): www.cdc.gov  Summary  Exercising regularly is important. It will improve your overall fitness, flexibility, and endurance.  Regular exercise will also improve your overall health. It can help you control your weight, reduce stress, and improve your bone density.  Do not exercise so much that you hurt yourself, feel dizzy, or get very short of breath.  Before you start a new exercise program, talk with your health care provider.  This information is not intended to replace advice given to you by your health care provider. Make sure you discuss any questions you have with your health care provider.  Document Revised: 04/15/2022 Document Reviewed: 04/15/2022  Elsevier Patient Education © 2023 Elsevier Inc. Fall Prevention in the Home, Adult  Falls can cause injuries and affect people of all ages. There are many simple things that you can do to make your home safe and to help prevent falls.  If you need it, ask for help making these changes.  What  actions can I take to prevent falls?  General information  Use good lighting in all rooms. Make sure to:  Replace any light bulbs that burn out.  Turn on lights if it is dark and use night-lights.  Keep items that you use often in easy-to-reach places. Lower the shelves around your home if needed.  Move furniture so that there are clear paths around it.  Do not keep throw rugs or other things on the floor that can make you trip.  If any of your floors are uneven, fix them.  Add color or contrast paint or tape to clearly melina and help you see:  Grab bars or handrails.  First and last steps of staircases.  Where the edge of each step is.  If you use a ladder or stepladder:  Make sure that it is fully opened. Do not climb a closed ladder.  Make sure the sides of the ladder are locked in place.  Have someone hold the ladder while you use it.  Know where your pets are as you move through your home.  What can I do in the bathroom?         Keep the floor dry. Clean up any water that is on the floor right away.  Remove soap buildup in the bathtub or shower. Buildup makes bathtubs and showers slippery.  Use non-skid mats or decals on the floor of the bathtub or shower.  Attach bath mats securely with double-sided, non-slip rug tape.  If you need to sit down while you are in the shower, use a non-slip stool.  Install grab bars by the toilet and in the bathtub and shower. Do not use towel bars as grab bars.  What can I do in the bedroom?  Make sure that you have a light by your bed that is easy to reach.  Do not use any sheets or blankets on your bed that hang to the floor.  Have a firm bench or chair with side arms that you can use for support when you get dressed.  What can I do in the kitchen?  Clean up any spills right away.  If you need to reach something above you, use a sturdy step stool that has a grab bar.  Keep electrical cables out of the way.  Do not use floor polish or wax that makes floors slippery.  What can I  do with my stairs?  Do not leave anything on the stairs.  Make sure that you have a light switch at the top and the bottom of the stairs. Have them installed if you do not have them.  Make sure that there are handrails on both sides of the stairs. Fix handrails that are broken or loose. Make sure that handrails are as long as the staircases.  Install non-slip stair treads on all stairs in your home if they do not have carpet.  Avoid having throw rugs at the top or bottom of stairs, or secure the rugs with carpet tape to prevent them from moving.  Choose a carpet design that does not hide the edge of steps on the stairs. Make sure that carpet is firmly attached to the stairs. Fix any carpet that is loose or worn.  What can I do on the outside of my home?  Use bright outdoor lighting.  Repair the edges of walkways and driveways and fix any cracks. Clear paths of anything that can make you trip, such as tools or rocks.  Add color or contrast paint or tape to clearly melina and help you see high doorway thresholds.  Trim any bushes or trees on the main path into your home.  Check that handrails are securely fastened and in good repair. Both sides of all steps should have handrails.  Install guardrails along the edges of any raised decks or porches.  Have leaves, snow, and ice cleared regularly. Use sand, salt, or ice melt on walkways during winter months if you live where there is ice and snow.  In the garage, clean up any spills right away, including grease or oil spills.  What other actions can I take?  Review your medicines with your health care provider. Some medicines can make you confused or feel dizzy. This can increase your chance of falling.  Wear closed-toe shoes that fit well and support your feet. Wear shoes that have rubber soles and low heels.  Use a cane, walker, scooter, or crutches that help you move around if needed.  Talk with your provider about other ways that you can decrease your risk of falls. This  may include seeing a physical therapist to learn to do exercises to improve movement and strength.  Where to find more information  Centers for Disease Control and Prevention, [place]: cdc.gov  National [place] on Aging: rajeev.nih.gov  National [place] on Aging: rajeev.nih.gov  Contact a health care provider if:  You are afraid of falling at home.  You feel weak, drowsy, or dizzy at home.  You fall at home.  Get help right away if you:  Lose consciousness or have trouble moving after a fall.  Have a fall that causes a head injury.  These symptoms may be an emergency. Get help right away. Call 911.  Do not wait to see if the symptoms will go away.  Do not drive yourself to the hospital.  This information is not intended to replace advice given to you by your health care provider. Make sure you discuss any questions you have with your health care provider.  Document Revised: 08/21/2023 Document Reviewed: 08/21/2023  Elsevier Patient Education © 2024 Elsevier Inc.

## 2025-03-18 NOTE — PROGRESS NOTES
[facility]     Amanda Lawson  1940   [id number]      Patient Care Team:  Marie Soto MD as PCP - General  Marie Soto MD as PCP - Family Medicine  Holly Pierce MD as Consulting Physician (Ophthalmology)  Kenneth Giles MD as Consulting Physician (Urology)  Radha Celis MD as Consulting Physician (Obstetrics and Gynecology)    Chief Complaint   Patient presents with    Hypertension    Hyperlipidemia    Heartburn        Patient is a 84 y.o. female.   History of Present Illness  The patient presents for an office visit.    She reports no adverse effects from simvastatin, which she has been taking for an extended period. Her dietary habits include a low-fat diet with minimal meat consumption, although she admits to a higher sugar intake than recommended. She makes an effort to incorporate vegetables into her meals but acknowledges that her breakfast often lacks protein. Her diet includes eggs, cereal, yogurt at lunch, nuts, chicken, fish, and occasional pork chops. She is currently on a regimen of vitamin D supplements and two chewable calcium tablets containing vitamin D. She plans to resume her vitamin D supplement during the winter months.    She experienced symptoms suggestive of a urinary tract infection in the summer, prompting a visit to the emergency room where the diagnosis was ruled out. She expresses a desire for increased energy levels but reports satisfactory sleep patterns. She typically sleeps through the night without disturbances.    Her blood pressure readings at home have been consistently below 140/80, with the most recent reading being 139/69. She attributes today's elevated reading to a busy morning. She maintains an active lifestyle, walking 3 to 4 times per week. She is on losartan, hydrochlorothiazide, and metoprolol.    She finds famotidine effective in managing her heartburn symptoms most of the time.    MEDICATIONS  Simvastatin,  losartan, hydrochlorothiazide, metoprolol, famotidine, Lovaza, vitamin D, calcium with vitamin D.         CHRONIC CONDITIONS      Past Medical History:   Diagnosis Date    Allergic     Bladder cancer 1990    Closed fracture of nasal bone with routine healing 05/22/2020 5/22/2020 Marie Soto MD  Pain from nasal fracture has improved.  She no longer needs to take Tylenol.  There is mild residual swelling in the nasal cavity.    Glaucoma     History of transfusion 1965    after childbirth, [facility], no reaction    HL (hearing loss)     Hyperlipidemia     Hypertension     Laceration of brow without complication 05/22/2020 5/22/2020 Marie Soto MD  Laceration of right brow due to fall due to syncope.    Stitches removed today.  Steri-Strips applied to a 3 mm area without good approximation.  The rest of the laceration is well-healed.  She was advised to stop using Neosporin and just use a little Vaseline on it daily.    MVP (mitral valve prolapse)     Overweight with body mass index (BMI) of 26 to 26.9 in adult 02/04/2021 2/4/2021 Marie Soto MD  Mildly overweight BMI at 26.  Continue low-fat diet.  Avoid sugars and avoid snacking.  Increase exercise and physical activity.  Weigh every Monday morning to monitor.    Plantar fasciitis 2010    PONV (postoperative nausea and vomiting)     Pyuria 02/15/2023    Rosacea     Traumatic black eye, initial encounter 05/22/2020 5/22/2020 Marie Soto MD  Bilateral delmy-orbital ecchymoses due to fall due to syncope.    Vasovagal syncope     since childhood    Visual impairment        Past Surgical History:   Procedure Laterality Date    BLADDER SURGERY      tumor removed    CATARACT EXTRACTION Left 07/2022    COLONOSCOPY      CYSTOSCOPY      yearly since 1990    CYSTOSCOPY N/A 03/20/2024    Procedure: CYSTOSCOPY;  Surgeon: Radha Celis MD;  Location: [facility];  Service: Gynecology;  Laterality: N/A;    ENDOMETRIAL ABLATION  "     ENDOMETRIAL BIOPSY  1983    HYSTERECTOMY      VAGINAL HYSTERECTOMY W/ ANTERIOR AND POSTERIOR VAGINAL REPAIR N/A 03/20/2024    Procedure: TOTAL VAGINAL HYSTERECTOMY, ANTERIOR AND POSTERIOR COLPORRHAPHY VAGINAL REPAIR, UTEROSACRAL LIGAMENT VAGINAL VAULT SUSPENSION;  Surgeon: Radha Celis MD;  Location: [facility];  Service: Gynecology;  Laterality: N/A;       Family History   Problem Relation Age of Onset    Coronary artery disease Mother     Early death Mother     Hypertension Father     Coronary artery disease Brother     Hyperlipidemia Daughter     Obesity Maternal Grandmother     Mitral valve prolapse Daughter     Diabetes Maternal Grandfather     Breast cancer Neg Hx     Ovarian cancer Neg Hx        Social History     Socioeconomic History    Marital status:    Tobacco Use    Smoking status: Never    Smokeless tobacco: Never   Vaping Use    Vaping status: Never Used   Substance and Sexual Activity    Alcohol use: Not Currently    Drug use: Never    Sexual activity: Not Currently     Comment: I do not have a partner.       Allergies   Allergen Reactions    Brimonidine Other (See Comments)     hypotension       Review of Systems:     Review of Systems    Vital Signs  Vitals:    03/18/25 1317   BP: 140/88   BP Location: Left arm   Patient Position: Sitting   Cuff Size: Adult   Pulse: 72   Temp: 97.8 °F (36.6 °C)   TempSrc: Infrared   SpO2: 95%   Weight: 65.4 kg (144 lb 3.2 oz)   Height: 157.5 cm (62.01\")   PainSc: 0-No pain     Body mass index is 26.37 kg/m².  BMI is >= 25 and <30. (Overweight) The following options were offered after discussion;: exercise counseling/recommendations, nutrition counseling/recommendations, and Information on healthy weight added to patient's after visit summary.          Current Outpatient Medications:     CALCIUM-VITAMIN D PO, Take 1 tablet by mouth 2 (Two) Times a Day. 1 chewable in the morning and after dinner., Disp: , Rfl:     Coenzyme Q10 (CO Q 10) 100 MG " capsule, Take 1 capsule by mouth Daily., Disp: , Rfl:     famotidine (PEPCID) 20 MG tablet, Take 1 tablet by mouth 2 (Two) Times a Day., Disp: 180 tablet, Rfl: 3    hydroCHLOROthiazide 12.5 MG tablet, Take 1 tablet by mouth Daily., Disp: 90 tablet, Rfl: 3    losartan (COZAAR) 100 MG tablet, Take 1 tablet by mouth Daily., Disp: 90 tablet, Rfl: 3    metoprolol succinate XL (TOPROL-XL) 50 MG 24 hr tablet, Take 1 tablet by mouth 2 (Two) Times a Day., Disp: 180 tablet, Rfl: 3    Multiple Vitamins-Minerals (SENIOR MULTIVITAMIN PLUS PO), Take 1 tablet by mouth Daily., Disp: , Rfl:     omega-3 acid ethyl esters (LOVAZA) 1 g capsule, Take 1 capsule by mouth Every Night., Disp: 90 capsule, Rfl: 3    simvastatin (ZOCOR) 20 MG tablet, Take 1 tablet by mouth every night at bedtime., Disp: 90 tablet, Rfl: 3    sodium chloride (KARRIE 128) 5 % ophthalmic solution, Administer 1 drop to both eyes 2 (Two) Times a Day. Every morning and night, Disp: , Rfl:     timolol (TIMOPTIC) 0.5 % ophthalmic solution, Administer 1 drop to both eyes Daily., Disp: , Rfl:     travoprost, BRANDYN free, (TRAVATAN) 0.004 % solution ophthalmic solution, Administer 1 drop to both eyes Every Night. Instill 1 drop into each eye once daily, Disp: , Rfl:     Physical Exam:    Physical Exam  Vitals and nursing note reviewed.   Constitutional:       Appearance: She is well-developed.   HENT:      Head: Normocephalic.   Eyes:      Conjunctiva/sclera: Conjunctivae normal.      Pupils: Pupils are equal, round, and reactive to light.   Neck:      Thyroid: No thyromegaly.   Cardiovascular:      Rate and Rhythm: Normal rate and regular rhythm.      Heart sounds: Normal heart sounds.   Pulmonary:      Effort: Pulmonary effort is normal.      Breath sounds: Normal breath sounds. No wheezing.   Musculoskeletal:         General: Normal range of motion.      Cervical back: Normal range of motion and neck supple.   Lymphadenopathy:      Cervical: No cervical adenopathy.  "  Skin:     General: Skin is warm and dry.   Neurological:      Mental Status: She is alert and oriented to person, place, and time.   Psychiatric:         Thought Content: Thought content normal.          ACE III MINI        Results Review:    I reviewed the patient's new clinical results.  Results  Laboratory Studies  Potassium, sodium, and calcium levels are good. Blood sugar is normal at 91. LDL cholesterol is at 84. Triglycerides are normal. Thyroid function is fine. Vitamin D level is 77. White blood cell count, red blood cell count, and platelet count are all good. Urine test shows some bacteria but no white blood cells or red blood cells. Urine culture did not grow anything bad.       CMP:  Lab Results   Component Value Date    Glucose 91 03/14/2025    Glucose, UA Negative 03/14/2025    BUN 14 03/14/2025    BUN/Creatinine Ratio 16.9 03/14/2025    Creatinine 0.83 03/14/2025    Creatinine, Urine 57.6 03/14/2025    Ketones, UA Negative 03/14/2025    CO2 29.6 (H) 03/14/2025    Calcium 10.1 03/14/2025    Albumin 4.6 03/14/2025    AST (SGOT) 26 03/14/2025    ALT (SGPT) 20 03/14/2025     HbA1c:  No results found for: \"HGBA1C\"  Microalbumin:  Lab Results   Component Value Date    MICROALBUR 2.5 03/14/2025     Lipid Panel  Lab Results   Component Value Date    CHOL 163 03/14/2025    TRIG 136 03/14/2025    HDL 55 03/14/2025    LDL 84 03/14/2025    AST 26 03/14/2025    ALT 20 03/14/2025       Medication Review: Medications reviewed and noted  Patient Instructions   Problem List Items Addressed This Visit          Cardiac and Vasculature    Benign essential hypertension - Primary    Overview   Taking losartan 100mg and hydrochlorothiazide 12.5 mg daily and metoprolol XL 50mg twice a day.          Relevant Medications    metoprolol succinate XL (TOPROL-XL) 50 MG 24 hr tablet    losartan (COZAAR) 100 MG tablet    hydroCHLOROthiazide 12.5 MG tablet    Mixed hyperlipidemia    Overview   Taking simvastatin and lovaza.      "    Relevant Medications    simvastatin (ZOCOR) 20 MG tablet    omega-3 acid ethyl esters (LOVAZA) 1 g capsule       Gastrointestinal Abdominal     GERD without esophagitis    Overview   Taking famotidine twice a day.           Relevant Medications    famotidine (PEPCID) 20 MG tablet       Musculoskeletal and Injuries    Osteopenia of multiple sites    Overview   DEXA 11/1/2021 showed a decrease in T-scores in the spine, but it is still in the osteopenia range.  T score in the femoral neck is -1.3 which is also in the mild osteopenia range.                   Diagnosis Plan   1. Benign essential hypertension  hydroCHLOROthiazide 12.5 MG tablet      2. Mixed hyperlipidemia  simvastatin (ZOCOR) 20 MG tablet      3. GERD without esophagitis  famotidine (PEPCID) 20 MG tablet      4. Osteopenia of multiple sites          Assessment & Plan  1. Benign essential hypertension.  Her blood pressure readings typically fall below 140/80. The current treatment plan will be maintained, which includes daily administration of hydrochlorothiazide and losartan, and twice-daily metoprolol. Dietary modifications, specifically the avoidance of salt, will continue.    2. Mixed hyperlipidemia.  She will persist with the nightly simvastatin regimen. Given the satisfactory control of triglycerides, the dosage of omega-3 acid ethyl esters (Lovaza) will be reduced to one capsule every evening.    3. Gastroesophageal reflux disease (GERD).  The twice-daily famotidine regimen will be continued. An additional tablet may be taken intermittently as required. She will also continue to avoid lying down post meals and large meal portions.    4. Osteopenia.  She will continue with walking and weight-bearing exercises. The use of small hand weights at home while watching TV is recommended to enhance bone density. The intake of calcium with vitamin D will be continued. The extra vitamin D3 tablet is not necessary at this point due to the satisfactory  vitamin D levels.    Follow-up  The patient will follow up in September for annual wellness.         Plan of care reviewed with patient at the conclusion of today's visit. Education was provided regarding diagnosis, management, and any prescribed or recommended OTC medications. Patient verbalizes understanding of and agreement with management plan.         03/18/25   13:17 EDT    Patient or patient representative verbalized consent for the use of Ambient Listening during the visit with  Marie Soto MD for chart documentation. 3/18/2025  14:05 EDT

## (undated) DEVICE — CYSTO/BLADDER IRRIGATION SET, REGULATING CLAMP

## (undated) DEVICE — GLV SURG SIGNATURE TOUCH PF LTX 7 STRL

## (undated) DEVICE — MEDI-VAC YANKAUER SUCTION HANDLE W/BULBOUS TIP: Brand: CARDINAL HEALTH

## (undated) DEVICE — SCRB SURG BACTOSHIELD CHG 4PCT 4OZ

## (undated) DEVICE — SPONGE,LAP,4"X18",XR,ST,5/PK,40PK/CS: Brand: MEDLINE INDUSTRIES, INC.

## (undated) DEVICE — HYPODERMIC SAFETY NEEDLE: Brand: MONOJECT

## (undated) DEVICE — TUBING, SUCTION, 1/4" X 10', STRAIGHT: Brand: MEDLINE

## (undated) DEVICE — TRAP FLD MINIVAC MEGADYNE 100ML

## (undated) DEVICE — PATIENT RETURN ELECTRODE, SINGLE-USE, CONTACT QUALITY MONITORING, ADULT, WITH 9FT CORD, FOR PATIENTS WEIGING OVER 33LBS. (15KG): Brand: MEGADYNE

## (undated) DEVICE — ANTIBACTERIAL UNDYED BRAIDED (POLYGLACTIN 910), SYNTHETIC ABSORBABLE SUTURE: Brand: COATED VICRYL

## (undated) DEVICE — IRRIGATOR BULB ASEPTO 60CC STRL

## (undated) DEVICE — INTENDED FOR TISSUE SEPARATION, AND OTHER PROCEDURES THAT REQUIRE A SHARP SURGICAL BLADE TO PUNCTURE OR CUT.: Brand: BARD-PARKER ® STAINLESS STEEL BLADES

## (undated) DEVICE — SUT VIC 12X27 D8116 BX/12

## (undated) DEVICE — JELLY,LUBE,STERILE,FLIP TOP,TUBE,2-OZ: Brand: MEDLINE

## (undated) DEVICE — LEX VAGINAL HYSTERECTOMY: Brand: MEDLINE INDUSTRIES, INC.

## (undated) DEVICE — TBG PENCL TELESCP MEGADYNE SMOKE EVAC 10FT

## (undated) DEVICE — DRAPE,TOP,102X53,STERILE: Brand: MEDLINE

## (undated) DEVICE — GLV SURG PREMIERPRO MIC LTX PF SZ6 BRN

## (undated) DEVICE — SUT VICYL PLS CTD ANTIB BR 1 27IN VIL

## (undated) DEVICE — GLV SURG SENSICARE PI MIC PF SZ6.5 LF STRL

## (undated) DEVICE — PCH INST SURG INVISISHIELD 2PCKT